# Patient Record
Sex: FEMALE | Race: WHITE | NOT HISPANIC OR LATINO | Employment: FULL TIME | URBAN - METROPOLITAN AREA
[De-identification: names, ages, dates, MRNs, and addresses within clinical notes are randomized per-mention and may not be internally consistent; named-entity substitution may affect disease eponyms.]

---

## 2017-05-15 ENCOUNTER — OFFICE VISIT (OUTPATIENT)
Dept: INTERNAL MEDICINE | Facility: CLINIC | Age: 52
End: 2017-05-15

## 2017-05-15 VITALS
SYSTOLIC BLOOD PRESSURE: 128 MMHG | DIASTOLIC BLOOD PRESSURE: 80 MMHG | HEIGHT: 67 IN | RESPIRATION RATE: 14 BRPM | BODY MASS INDEX: 36.88 KG/M2 | WEIGHT: 235 LBS

## 2017-05-15 DIAGNOSIS — E53.8 VITAMIN B 12 DEFICIENCY: ICD-10-CM

## 2017-05-15 DIAGNOSIS — Z12.11 SCREENING FOR COLON CANCER: ICD-10-CM

## 2017-05-15 DIAGNOSIS — Z00.00 ANNUAL PHYSICAL EXAM: Primary | ICD-10-CM

## 2017-05-15 DIAGNOSIS — E03.9 HYPOTHYROIDISM, ADULT: ICD-10-CM

## 2017-05-15 DIAGNOSIS — F90.8 ATTENTION-DEFICIT HYPERACTIVITY DISORDER, OTHER TYPE: ICD-10-CM

## 2017-05-15 DIAGNOSIS — E55.9 VITAMIN D DEFICIENCY: ICD-10-CM

## 2017-05-15 DIAGNOSIS — Z12.4 PAP SMEAR FOR CERVICAL CANCER SCREENING: ICD-10-CM

## 2017-05-15 PROBLEM — F90.9 ATTENTION DEFICIT HYPERACTIVITY DISORDER (ADHD): Status: ACTIVE | Noted: 2017-05-15

## 2017-05-15 LAB
25(OH)D3 SERPL-MCNC: 15.7 NG/ML (ref 30–100)
ALBUMIN SERPL-MCNC: 4.7 G/DL (ref 3.5–5.2)
ALBUMIN/GLOB SERPL: 1.6 G/DL
ALP SERPL-CCNC: 103 U/L (ref 39–117)
ALT SERPL-CCNC: 20 U/L (ref 1–33)
AST SERPL-CCNC: 15 U/L (ref 1–32)
BACTERIA UR QL AUTO: ABNORMAL /HPF
BASOPHILS # BLD AUTO: 0.03 10*3/MM3 (ref 0–0.2)
BASOPHILS NFR BLD AUTO: 0.3 % (ref 0–1.5)
BILIRUB SERPL-MCNC: 0.4 MG/DL (ref 0.1–1.2)
BILIRUB UR QL STRIP: NEGATIVE
BUN SERPL-MCNC: 13 MG/DL (ref 6–20)
BUN/CREAT SERPL: 16.9 (ref 7–25)
CALCIUM SERPL-MCNC: 10.3 MG/DL (ref 8.6–10.5)
CHLORIDE SERPL-SCNC: 101 MMOL/L (ref 98–107)
CHOLEST SERPL-MCNC: 256 MG/DL (ref 0–200)
CLARITY UR: CLEAR
CO2 SERPL-SCNC: 28.3 MMOL/L (ref 22–29)
COLOR UR: YELLOW
CREAT SERPL-MCNC: 0.77 MG/DL (ref 0.57–1)
EOSINOPHIL # BLD AUTO: 0.08 10*3/MM3 (ref 0–0.7)
EOSINOPHIL # BLD AUTO: 0.7 % (ref 0.3–6.2)
ERYTHROCYTE [DISTWIDTH] IN BLOOD BY AUTOMATED COUNT: 13.5 % (ref 11.7–13)
FOLATE SERPL-MCNC: 13.07 NG/ML (ref 4.78–24.2)
GLOBULIN SER CALC-MCNC: 2.9 GM/DL
GLUCOSE SERPL-MCNC: 83 MG/DL (ref 65–99)
GLUCOSE UR STRIP-MCNC: NEGATIVE MG/DL
HCT VFR BLD AUTO: 42.5 % (ref 35.6–45.5)
HDLC SERPL-MCNC: 76 MG/DL (ref 40–60)
HEMOCCULT STL QL IA: NEGATIVE
HGB BLD-MCNC: 13.5 G/DL (ref 11.9–15.5)
HGB UR QL STRIP.AUTO: ABNORMAL
HYALINE CASTS UR QL AUTO: ABNORMAL /LPF
IMM GRANULOCYTES # BLD: 0.1 10*3/MM3 (ref 0–0.03)
IMM GRANULOCYTES NFR BLD: 0.8 % (ref 0–0.5)
KETONES UR QL STRIP: NEGATIVE
LDLC SERPL CALC-MCNC: 146 MG/DL (ref 0–100)
LEUKOCYTE ESTERASE UR QL STRIP.AUTO: NEGATIVE
LYMPHOCYTES # BLD AUTO: 3.34 10*3/MM3 (ref 0.9–4.8)
LYMPHOCYTES NFR BLD AUTO: 28.3 % (ref 19.6–45.3)
MCH RBC QN AUTO: 28.1 PG (ref 26.9–32)
MCHC RBC AUTO-ENTMCNC: 31.8 G/DL (ref 32.4–36.3)
MCV RBC AUTO: 88.5 FL (ref 80.5–98.2)
MONOCYTES # BLD AUTO: 0.57 10*3/MM3 (ref 0.2–1.2)
MONOCYTES NFR BLD AUTO: 4.8 % (ref 5–12)
MUCOUS THREADS URNS QL MICRO: ABNORMAL /HPF
NEUTROPHILS # BLD AUTO: 7.7 10*3/MM3 (ref 1.9–8.1)
NEUTROPHILS NFR BLD AUTO: 65.1 % (ref 42.7–76)
NITRITE UR QL STRIP: NEGATIVE
PH UR STRIP.AUTO: 5.5 [PH] (ref 5–8)
PLATELET # BLD AUTO: 361 10*3/MM3 (ref 140–500)
POTASSIUM SERPL-SCNC: 4.7 MMOL/L (ref 3.5–5.2)
PROT SERPL-MCNC: 7.6 G/DL (ref 6–8.5)
PROT UR QL STRIP: NEGATIVE
RBC # BLD AUTO: 4.8 10*6/MM3 (ref 3.9–5.2)
RBC # UR: ABNORMAL /HPF
REF LAB TEST METHOD: ABNORMAL
SODIUM SERPL-SCNC: 143 MMOL/L (ref 136–145)
SP GR UR STRIP: 1.02 (ref 1–1.03)
SQUAMOUS #/AREA URNS HPF: ABNORMAL /HPF
TRIGL SERPL-MCNC: 170 MG/DL (ref 0–150)
TSH SERPL-ACNC: 54.67 MIU/ML (ref 0.27–4.2)
UROBILINOGEN UR QL STRIP: ABNORMAL
VIT B12 SERPL-MCNC: 385 PG/ML (ref 211–946)
VLDLC SERPL-MCNC: 34 MG/DL (ref 5–40)
WBC # BLD AUTO: 11.82 10*3/MM3 (ref 4.5–10.7)
WBC UR QL AUTO: ABNORMAL /HPF

## 2017-05-15 PROCEDURE — 81001 URINALYSIS AUTO W/SCOPE: CPT | Performed by: INTERNAL MEDICINE

## 2017-05-15 PROCEDURE — 99396 PREV VISIT EST AGE 40-64: CPT | Performed by: INTERNAL MEDICINE

## 2017-05-15 PROCEDURE — 82274 ASSAY TEST FOR BLOOD FECAL: CPT | Performed by: INTERNAL MEDICINE

## 2017-05-15 PROCEDURE — 93000 ELECTROCARDIOGRAM COMPLETE: CPT | Performed by: INTERNAL MEDICINE

## 2017-05-15 RX ORDER — LEVOTHYROXINE SODIUM 137 UG/1
137 TABLET ORAL DAILY
Qty: 90 TABLET | Refills: 3 | Status: SHIPPED | OUTPATIENT
Start: 2017-05-15 | End: 2018-02-07 | Stop reason: SDUPTHER

## 2017-05-15 RX ORDER — METHYLPHENIDATE HYDROCHLORIDE 18 MG/1
18 TABLET, EXTENDED RELEASE ORAL EVERY MORNING
Qty: 30 TABLET | Refills: 0 | Status: SHIPPED | OUTPATIENT
Start: 2017-05-15 | End: 2017-05-24

## 2017-05-16 RX ORDER — LIOTHYRONINE SODIUM 5 UG/1
5 TABLET ORAL 2 TIMES DAILY
Qty: 180 TABLET | Refills: 3 | Status: SHIPPED | OUTPATIENT
Start: 2017-05-16 | End: 2018-02-07 | Stop reason: SDUPTHER

## 2017-05-19 LAB
CHROM ANALY OVERALL INTERP-IMP: NORMAL
CONV .: NORMAL
CONV PERFORMED BY:: NORMAL
DX ICD CODE: NORMAL
HIV 1 & 2 AB SER-IMP: NORMAL
HPV I/H RISK 1 DNA CVX QL PROBE+SIG AMP: NEGATIVE
REF LAB TEST METHOD: NORMAL
STAT OF ADQ CVX/VAG CYTO-IMP: NORMAL

## 2017-05-22 RX ORDER — ERGOCALCIFEROL 1.25 MG/1
50000 CAPSULE ORAL WEEKLY
Qty: 12 CAPSULE | Refills: 1 | Status: SHIPPED | OUTPATIENT
Start: 2017-05-22 | End: 2017-05-23 | Stop reason: SDUPTHER

## 2017-05-23 RX ORDER — ERGOCALCIFEROL 1.25 MG/1
CAPSULE ORAL
Qty: 12 CAPSULE | Refills: 1 | Status: SHIPPED | OUTPATIENT
Start: 2017-05-23 | End: 2017-10-27 | Stop reason: SDUPTHER

## 2017-05-23 RX ORDER — ERGOCALCIFEROL 1.25 MG/1
50000 CAPSULE ORAL WEEKLY
Qty: 12 CAPSULE | Refills: 1 | Status: SHIPPED | OUTPATIENT
Start: 2017-05-23 | End: 2017-05-23 | Stop reason: SDUPTHER

## 2017-05-24 RX ORDER — ATOMOXETINE 40 MG/1
40 CAPSULE ORAL DAILY
Qty: 30 CAPSULE | Refills: 1 | Status: SHIPPED | OUTPATIENT
Start: 2017-05-24 | End: 2017-12-20

## 2017-06-13 ENCOUNTER — OFFICE VISIT (OUTPATIENT)
Dept: INTERNAL MEDICINE | Facility: CLINIC | Age: 52
End: 2017-06-13

## 2017-06-13 ENCOUNTER — TELEPHONE (OUTPATIENT)
Dept: INTERNAL MEDICINE | Facility: CLINIC | Age: 52
End: 2017-06-13

## 2017-06-13 VITALS
RESPIRATION RATE: 14 BRPM | HEIGHT: 67 IN | DIASTOLIC BLOOD PRESSURE: 84 MMHG | SYSTOLIC BLOOD PRESSURE: 128 MMHG | WEIGHT: 235.4 LBS | BODY MASS INDEX: 36.95 KG/M2

## 2017-06-13 DIAGNOSIS — D51.0 PERNICIOUS ANEMIA: ICD-10-CM

## 2017-06-13 DIAGNOSIS — E66.9 OBESITY (BMI 30-39.9): ICD-10-CM

## 2017-06-13 DIAGNOSIS — E03.9 HYPOTHYROIDISM, ADULT: Primary | ICD-10-CM

## 2017-06-13 DIAGNOSIS — F90.8 ATTENTION-DEFICIT HYPERACTIVITY DISORDER, OTHER TYPE: ICD-10-CM

## 2017-06-13 DIAGNOSIS — E03.9 HYPOTHYROIDISM, UNSPECIFIED TYPE: ICD-10-CM

## 2017-06-13 PROCEDURE — 99213 OFFICE O/P EST LOW 20 MIN: CPT | Performed by: INTERNAL MEDICINE

## 2017-06-13 PROCEDURE — 96372 THER/PROPH/DIAG INJ SC/IM: CPT | Performed by: INTERNAL MEDICINE

## 2017-06-13 RX ORDER — CYANOCOBALAMIN 1000 UG/ML
1000 INJECTION, SOLUTION INTRAMUSCULAR; SUBCUTANEOUS
Status: DISCONTINUED | OUTPATIENT
Start: 2017-06-13 | End: 2018-10-16 | Stop reason: SDUPTHER

## 2017-06-13 RX ADMIN — CYANOCOBALAMIN 1000 MCG: 1000 INJECTION, SOLUTION INTRAMUSCULAR; SUBCUTANEOUS at 16:31

## 2017-06-13 NOTE — PROGRESS NOTES
Chief Complaint   Patient presents with   • Follow-up     1 month check up patient has been taking cytomel for thyroid and strattera         Subjective   John Monk is a 51 y.o. female     HPI:      Hypothyroidism: Current treatment levothyroxine. And liothyronine.  She is compliant with the medication, tolerates it well and reports good control of symptoms of hypothyroidism.  She has not noted any change in tolerance of heat or cold.  No change in hair or skin.  No constipation.  No symptoms of hyperthyroidism.  She does have problems with concentration.  Her recent TSH was significantly elevated.  Cytomel has recently been restarted.    ADD:  She comes in for follow up of ADD.  This is a chronic problem.  Symptoms include easy distractibility, poor concentration, short attention span, forgetfulness.  This happens at work.   Symptoms are moderate in severity.  Symptoms are unchanged.  Exacerbating factors include fatigue, distracting activities.  She has taken bupropion for this in the past.  Recently, she has started taking Strattera.  So far, she has not noted any changes in her symptoms.  Hypothyroidism may be contributing.    She has history of B12 deficiency and at one time was told she had pernicious anemia.  She would like to get a B12 injection.      The following portions of the patient's history were reviewed and updated as appropriate: allergies, current medications, past social history, problem list, past surgical history    Review of Systems   Constitutional: Negative for activity change and appetite change.   HENT: Negative for nosebleeds.    Eyes: Negative for visual disturbance.   Respiratory: Negative for cough and shortness of breath.    Cardiovascular: Negative for chest pain, palpitations and leg swelling.   Neurological: Negative for headaches.   Psychiatric/Behavioral: Negative for sleep disturbance.           Objective     /84 (BP Location: Left arm, Patient Position: Sitting, Cuff  "Size: Adult)  Resp 14  Ht 67\" (170.2 cm)  Wt 235 lb 6.4 oz (107 kg)  BMI 36.87 kg/m2     Physical Exam   Constitutional: She is oriented to person, place, and time. She appears well-developed and well-nourished. No distress.   Neck: Normal carotid pulses present. Carotid bruit is not present.   Cardiovascular: Normal rate, regular rhythm, S1 normal, S2 normal and intact distal pulses.  Exam reveals no gallop and no friction rub.    No murmur heard.  Pulses:       Carotid pulses are 2+ on the right side, and 2+ on the left side.  Pulmonary/Chest: Effort normal and breath sounds normal. No respiratory distress. She has no wheezes. She has no rhonchi. She has no rales. Chest wall is not dull to percussion.   Musculoskeletal: She exhibits no edema.   Neurological: She is alert and oriented to person, place, and time.   Skin: Skin is warm and dry.   Nursing note and vitals reviewed.      Assessment/Plan   Problem List Items Addressed This Visit        Endocrine    Hypothyroidism, adult - Primary       Other    Attention deficit hyperactivity disorder (ADHD)      Other Visit Diagnoses     Obesity (BMI 30-39.9)        Relevant Orders    Hemoglobin A1c    Hypothyroidism, unspecified type        Relevant Orders    T3, free    T4, free    TSH    Pernicious anemia        Relevant Medications    cyanocobalamin injection 1,000 mcg            "

## 2017-06-13 NOTE — TELEPHONE ENCOUNTER
----- Message from Kaylee Mccoy sent at 6/13/2017  3:52 PM EDT -----  Contact: pt  Patient would like Dr Lancaster to put lab orders in for her to have her A1C and Lipid tests done. The Patient would like the orders sent to her home so she can take them with her and have them done at her place of work.     Please advise     [phone]

## 2017-06-18 ENCOUNTER — RESULTS ENCOUNTER (OUTPATIENT)
Dept: INTERNAL MEDICINE | Facility: CLINIC | Age: 52
End: 2017-06-18

## 2017-06-18 DIAGNOSIS — E03.9 HYPOTHYROIDISM, UNSPECIFIED TYPE: ICD-10-CM

## 2017-06-18 DIAGNOSIS — E66.9 OBESITY (BMI 30-39.9): ICD-10-CM

## 2017-06-27 ENCOUNTER — OFFICE VISIT (OUTPATIENT)
Dept: INTERNAL MEDICINE | Facility: CLINIC | Age: 52
End: 2017-06-27

## 2017-06-27 ENCOUNTER — APPOINTMENT (OUTPATIENT)
Dept: WOMENS IMAGING | Facility: HOSPITAL | Age: 52
End: 2017-06-27

## 2017-06-27 DIAGNOSIS — Z12.31 ENCOUNTER FOR SCREENING MAMMOGRAM FOR BREAST CANCER: ICD-10-CM

## 2017-06-27 DIAGNOSIS — Z12.31 ENCOUNTER FOR SCREENING MAMMOGRAM FOR BREAST CANCER: Primary | ICD-10-CM

## 2017-06-27 PROCEDURE — 77067 SCR MAMMO BI INCL CAD: CPT | Performed by: INTERNAL MEDICINE

## 2017-06-27 PROCEDURE — 77067 SCR MAMMO BI INCL CAD: CPT | Performed by: RADIOLOGY

## 2017-07-18 ENCOUNTER — CLINICAL SUPPORT (OUTPATIENT)
Dept: INTERNAL MEDICINE | Facility: CLINIC | Age: 52
End: 2017-07-18

## 2017-07-18 ENCOUNTER — LAB (OUTPATIENT)
Dept: INTERNAL MEDICINE | Facility: CLINIC | Age: 52
End: 2017-07-18

## 2017-07-18 DIAGNOSIS — E03.9 HYPOTHYROIDISM, ADULT: Primary | ICD-10-CM

## 2017-07-18 DIAGNOSIS — E53.8 B12 DEFICIENCY: Primary | ICD-10-CM

## 2017-07-18 DIAGNOSIS — E66.9 OBESITY (BMI 30-39.9): ICD-10-CM

## 2017-07-18 LAB — HBA1C MFR BLD: 5.6 % (ref 4–6)

## 2017-07-18 PROCEDURE — 96372 THER/PROPH/DIAG INJ SC/IM: CPT | Performed by: INTERNAL MEDICINE

## 2017-07-18 PROCEDURE — 83036 HEMOGLOBIN GLYCOSYLATED A1C: CPT | Performed by: INTERNAL MEDICINE

## 2017-07-18 RX ORDER — CYANOCOBALAMIN 1000 UG/ML
1000 INJECTION, SOLUTION INTRAMUSCULAR; SUBCUTANEOUS
Status: DISCONTINUED | OUTPATIENT
Start: 2017-07-18 | End: 2018-10-16 | Stop reason: SDUPTHER

## 2017-07-18 RX ADMIN — CYANOCOBALAMIN 1000 MCG: 1000 INJECTION, SOLUTION INTRAMUSCULAR; SUBCUTANEOUS at 15:57

## 2017-07-19 LAB
T3FREE SERPL-MCNC: 4.6 PG/ML (ref 2–4.4)
T4 FREE SERPL-MCNC: 1.64 NG/DL (ref 0.93–1.7)
TSH SERPL-ACNC: 0.13 MIU/ML (ref 0.27–4.2)

## 2017-10-13 RX ORDER — OMEPRAZOLE 40 MG/1
40 CAPSULE, DELAYED RELEASE ORAL DAILY
Qty: 90 CAPSULE | Refills: 3 | Status: SHIPPED | OUTPATIENT
Start: 2017-10-13 | End: 2018-10-21 | Stop reason: SDUPTHER

## 2017-10-30 RX ORDER — ERGOCALCIFEROL 1.25 MG/1
CAPSULE ORAL
Qty: 12 CAPSULE | Refills: 0 | Status: SHIPPED | OUTPATIENT
Start: 2017-10-30 | End: 2018-02-07 | Stop reason: SDUPTHER

## 2017-12-20 ENCOUNTER — OFFICE VISIT (OUTPATIENT)
Dept: INTERNAL MEDICINE | Facility: CLINIC | Age: 52
End: 2017-12-20

## 2017-12-20 VITALS
OXYGEN SATURATION: 99 % | TEMPERATURE: 98.1 F | SYSTOLIC BLOOD PRESSURE: 140 MMHG | HEART RATE: 72 BPM | HEIGHT: 67 IN | DIASTOLIC BLOOD PRESSURE: 90 MMHG

## 2017-12-20 DIAGNOSIS — L30.9 DERMATITIS: ICD-10-CM

## 2017-12-20 DIAGNOSIS — J02.9 SORE THROAT: Primary | ICD-10-CM

## 2017-12-20 DIAGNOSIS — R03.0 ELEVATED BLOOD PRESSURE READING: ICD-10-CM

## 2017-12-20 LAB
EXPIRATION DATE: NORMAL
INTERNAL CONTROL: NORMAL
Lab: NORMAL
S PYO AG THROAT QL: NEGATIVE

## 2017-12-20 PROCEDURE — 87880 STREP A ASSAY W/OPTIC: CPT | Performed by: NURSE PRACTITIONER

## 2017-12-20 PROCEDURE — 99213 OFFICE O/P EST LOW 20 MIN: CPT | Performed by: NURSE PRACTITIONER

## 2017-12-20 RX ORDER — BUPROPION HYDROCHLORIDE 300 MG/1
300 TABLET ORAL DAILY
COMMUNITY
End: 2018-02-07 | Stop reason: SDUPTHER

## 2017-12-20 NOTE — PATIENT INSTRUCTIONS
Eczema  Eczema, also called atopic dermatitis, is a skin disorder that causes inflammation of the skin. It causes a red rash and dry, scaly skin. The skin becomes very itchy. Eczema is generally worse during the cooler winter months and often improves with the warmth of summer. Eczema usually starts showing signs in infancy. Some children outgrow eczema, but it may last through adulthood.   CAUSES   The exact cause of eczema is not known, but it appears to run in families. People with eczema often have a family history of eczema, allergies, asthma, or hay fever. Eczema is not contagious.  Flare-ups of the condition may be caused by:   · Contact with something you are sensitive or allergic to.    · Stress.  SIGNS AND SYMPTOMS  · Dry, scaly skin.    · Red, itchy rash.    · Itchiness. This may occur before the skin rash and may be very intense.    DIAGNOSIS   The diagnosis of eczema is usually made based on symptoms and medical history.  TREATMENT   Eczema cannot be cured, but symptoms usually can be controlled with treatment and other strategies. A treatment plan might include:  · Controlling the itching and scratching.      Use over-the-counter antihistamines as directed for itching. This is especially useful at night when the itching tends to be worse.      Use over-the-counter steroid creams as directed for itching.      Avoid scratching. Scratching makes the rash and itching worse. It may also result in a skin infection (impetigo) due to a break in the skin caused by scratching.    · Keeping the skin well moisturized with creams every day. This will seal in moisture and help prevent dryness. Lotions that contain alcohol and water should be avoided because they can dry the skin.    · Limiting exposure to things that you are sensitive or allergic to (allergens).    · Recognizing situations that cause stress.    · Developing a plan to manage stress.    HOME CARE INSTRUCTIONS   · Only take over-the-counter or  prescription medicines as directed by your health care provider.    · Do not use anything on the skin without checking with your health care provider.    · Keep baths or showers short (5 minutes) in warm (not hot) water. Use mild cleansers for bathing. These should be unscented. You may add nonperfumed bath oil to the bath water. It is best to avoid soap and bubble bath.    · Immediately after a bath or shower, when the skin is still damp, apply a moisturizing ointment to the entire body. This ointment should be a petroleum ointment. This will seal in moisture and help prevent dryness. The thicker the ointment, the better. These should be unscented.    · Keep fingernails cut short. Children with eczema may need to wear soft gloves or mittens at night after applying an ointment.    · Dress in clothes made of cotton or cotton blends. Dress lightly, because heat increases itching.    · A child with eczema should stay away from anyone with fever blisters or cold sores. The virus that causes fever blisters (herpes simplex) can cause a serious skin infection in children with eczema.  SEEK MEDICAL CARE IF:   · Your itching interferes with sleep.    · Your rash gets worse or is not better within 1 week after starting treatment.    · You see pus or soft yellow scabs in the rash area.    · You have a fever.    · You have a rash flare-up after contact with someone who has fever blisters.       This information is not intended to replace advice given to you by your health care provider. Make sure you discuss any questions you have with your health care provider.     Document Released: 12/15/2001 Document Revised: 10/08/2014 Document Reviewed: 07/21/2014  [facility] Interactive Patient Education ©2017 [facility] [employer]

## 2017-12-20 NOTE — PROGRESS NOTES
Subjective   John Monk is a 52 y.o. female.     Sore Throat    This is a new problem. The current episode started in the past 7 days. The problem has been gradually improving. Neither side of throat is experiencing more pain than the other. There has been no fever. The pain is at a severity of 0/10. The patient is experiencing no pain. Associated symptoms include congestion (throat) and coughing (varies in productive ). Pertinent negatives include no abdominal pain, ear discharge, ear pain, plugged ear sensation, shortness of breath, swollen glands or trouble swallowing. She has had no exposure to strep or mono. She has tried NSAIDs (mucinex ) for the symptoms. The treatment provided mild relief.   Rash   This is a new problem. The current episode started more than 1 month ago (has had rash for 6 months, however it got worst today ). The problem has been gradually worsening since onset. The affected locations include the chest. Associated symptoms include congestion (throat), coughing (varies in productive ) and a sore throat. Pertinent negatives include no fatigue, fever or shortness of breath. Past treatments include nothing.        The following portions of the patient's history were reviewed and updated as appropriate: allergies, current medications and problem list.    Review of Systems   Constitutional: Negative for fatigue and fever.   HENT: Positive for congestion (throat) and sore throat. Negative for ear discharge, ear pain and trouble swallowing.    Respiratory: Positive for cough (varies in productive ). Negative for shortness of breath.    Gastrointestinal: Negative for abdominal pain.   Skin: Positive for rash (to upper chest area only ).       Objective   Physical Exam   Constitutional: She appears well-developed and well-nourished. She is cooperative. She does not have a sickly appearance. She does not appear ill.   HENT:   Head: Normocephalic.   Right Ear: Hearing, tympanic membrane and external  ear normal. No drainage, swelling or tenderness. No mastoid tenderness. Tympanic membrane is not injected, not scarred, not erythematous and not bulging. Tympanic membrane mobility is normal. No middle ear effusion. No decreased hearing is noted.   Left Ear: Hearing, tympanic membrane and external ear normal. No drainage, swelling or tenderness. No mastoid tenderness. Tympanic membrane is not injected, not scarred, not erythematous and not bulging. Tympanic membrane mobility is normal.  No middle ear effusion. No decreased hearing is noted.   Nose: Nose normal. No mucosal edema, rhinorrhea, sinus tenderness or nasal deformity. Right sinus exhibits no maxillary sinus tenderness and no frontal sinus tenderness. Left sinus exhibits no maxillary sinus tenderness and no frontal sinus tenderness.   Mouth/Throat: Mucous membranes are normal. Normal dentition. Posterior oropharyngeal erythema (mild) present. No tonsillar exudate.   Eyes: Conjunctivae and lids are normal. Pupils are equal, round, and reactive to light. Right eye exhibits no discharge and no exudate. Left eye exhibits no discharge and no exudate.   Neck: Trachea normal and normal range of motion. Carotid bruit is not present. No edema present. No thyroid mass and no thyromegaly present.   Cardiovascular: Regular rhythm, normal heart sounds and normal pulses.    No murmur heard.  Repeat bp less than 138/88  No pedal edema    Pulmonary/Chest: Breath sounds normal. No respiratory distress. She has no decreased breath sounds. She has no wheezes. She has no rhonchi. She has no rales.   Lymphadenopathy:        Head (right side): No submental, no submandibular, no tonsillar, no preauricular, no posterior auricular and no occipital adenopathy present.        Head (left side): No submental, no submandibular, no tonsillar, no preauricular, no posterior auricular and no occipital adenopathy present.   Neurological: She is alert.   Skin: Skin is warm, dry and intact.  Rash noted. Rash is macular. No cyanosis. Nails show no clubbing.   Dry, erythema patches to upper chest wall area       Assessment/Plan   Amal was seen today for sore throat and rash.    Diagnoses and all orders for this visit:    Sore throat  Comments:  warm salt gargles   Orders:  -     POCT rapid strep A    Dermatitis  -     Crisaborole (EUCRISA) 2 % ointment; Apply 1 application topically 2 (Two) Times a Day.    Elevated blood pressure reading  Comments:  she will monitor and call if greater than 140/90; discuss low salt diet, weight loss and exercise    Rapid strep negative. If rash persist will refer to dermatologist.

## 2018-02-07 ENCOUNTER — OFFICE VISIT (OUTPATIENT)
Dept: INTERNAL MEDICINE | Facility: CLINIC | Age: 53
End: 2018-02-07

## 2018-02-07 VITALS
SYSTOLIC BLOOD PRESSURE: 138 MMHG | WEIGHT: 246 LBS | DIASTOLIC BLOOD PRESSURE: 84 MMHG | HEIGHT: 67 IN | BODY MASS INDEX: 38.61 KG/M2

## 2018-02-07 DIAGNOSIS — E28.2 POLYCYSTIC OVARIAN SYNDROME: ICD-10-CM

## 2018-02-07 DIAGNOSIS — E03.9 HYPOTHYROIDISM, ADULT: Primary | ICD-10-CM

## 2018-02-07 DIAGNOSIS — I10 ESSENTIAL HYPERTENSION: ICD-10-CM

## 2018-02-07 LAB
ALBUMIN SERPL-MCNC: 4.6 G/DL (ref 3.5–5.2)
ALBUMIN/GLOB SERPL: 1.5 G/DL
ALP SERPL-CCNC: 83 U/L (ref 39–117)
ALT SERPL W P-5'-P-CCNC: 20 U/L (ref 1–33)
ANION GAP SERPL CALCULATED.3IONS-SCNC: 12.3 MMOL/L
AST SERPL-CCNC: 12 U/L (ref 1–32)
BILIRUB SERPL-MCNC: 0.3 MG/DL (ref 0.1–1.2)
BUN BLD-MCNC: 14 MG/DL (ref 6–20)
BUN/CREAT SERPL: 16.1 (ref 7–25)
CALCIUM SPEC-SCNC: 10.2 MG/DL (ref 8.6–10.5)
CHLORIDE SERPL-SCNC: 97 MMOL/L (ref 98–107)
CHOLEST SERPL-MCNC: 216 MG/DL (ref 0–200)
CO2 SERPL-SCNC: 28.7 MMOL/L (ref 22–29)
CREAT BLD-MCNC: 0.87 MG/DL (ref 0.57–1)
ERYTHROCYTE [DISTWIDTH] IN BLOOD BY AUTOMATED COUNT: 13.9 % (ref 11.7–13)
GFR SERPL CREATININE-BSD FRML MDRD: 69 ML/MIN/1.73
GLOBULIN UR ELPH-MCNC: 3 GM/DL
GLUCOSE BLD-MCNC: 85 MG/DL (ref 65–99)
HBA1C MFR BLD: 5.1 % (ref 4.8–5.6)
HCT VFR BLD AUTO: 40.7 % (ref 35.6–45.5)
HDLC SERPL-MCNC: 67 MG/DL (ref 40–60)
HGB BLD-MCNC: 12.8 G/DL (ref 11.9–15.5)
LDLC SERPL CALC-MCNC: 118 MG/DL (ref 0–100)
LDLC/HDLC SERPL: 1.76 {RATIO}
MCH RBC QN AUTO: 27.4 PG (ref 26.9–32)
MCHC RBC AUTO-ENTMCNC: 31.4 G/DL (ref 32.4–36.3)
MCV RBC AUTO: 87.2 FL (ref 80.5–98.2)
PLATELET # BLD AUTO: 323 10*3/MM3 (ref 140–500)
POTASSIUM BLD-SCNC: 4.6 MMOL/L (ref 3.5–5.2)
PROT SERPL-MCNC: 7.6 G/DL (ref 6–8.5)
RBC # BLD AUTO: 4.67 10*6/MM3 (ref 3.9–5.2)
SODIUM BLD-SCNC: 138 MMOL/L (ref 136–145)
T4 FREE SERPL-MCNC: 1.73 NG/DL (ref 0.93–1.7)
TRIGL SERPL-MCNC: 156 MG/DL (ref 0–150)
TSH SERPL-ACNC: 0.05 MIU/ML (ref 0.27–4.2)
VLDLC SERPL-MCNC: 31.2 MG/DL (ref 5–40)
WBC # BLD AUTO: 9.88 10*3/MM3 (ref 4.5–10.7)

## 2018-02-07 PROCEDURE — 80053 COMPREHEN METABOLIC PANEL: CPT | Performed by: INTERNAL MEDICINE

## 2018-02-07 PROCEDURE — 99214 OFFICE O/P EST MOD 30 MIN: CPT | Performed by: INTERNAL MEDICINE

## 2018-02-07 PROCEDURE — 80061 LIPID PANEL: CPT | Performed by: INTERNAL MEDICINE

## 2018-02-07 RX ORDER — LIOTHYRONINE SODIUM 5 UG/1
5 TABLET ORAL DAILY
Qty: 90 TABLET | Refills: 1 | Status: SHIPPED | OUTPATIENT
Start: 2018-02-07 | End: 2018-09-14 | Stop reason: SDUPTHER

## 2018-02-07 RX ORDER — ERGOCALCIFEROL 1.25 MG/1
50000 CAPSULE ORAL
Qty: 12 CAPSULE | Refills: 1 | Status: SHIPPED | OUTPATIENT
Start: 2018-02-07 | End: 2018-09-30 | Stop reason: SDUPTHER

## 2018-02-07 RX ORDER — OLMESARTAN MEDOXOMIL AND HYDROCHLOROTHIAZIDE 20/12.5 20; 12.5 MG/1; MG/1
1 TABLET ORAL DAILY
Qty: 90 TABLET | Refills: 1 | Status: SHIPPED | OUTPATIENT
Start: 2018-02-07 | End: 2018-09-14 | Stop reason: SDUPTHER

## 2018-02-07 RX ORDER — BUPROPION HYDROCHLORIDE 300 MG/1
300 TABLET ORAL DAILY
Qty: 90 TABLET | Refills: 1 | Status: SHIPPED | OUTPATIENT
Start: 2018-02-07 | End: 2018-07-10 | Stop reason: SDUPTHER

## 2018-02-07 RX ORDER — METFORMIN HYDROCHLORIDE 500 MG/1
500 TABLET, EXTENDED RELEASE ORAL 2 TIMES DAILY
Qty: 180 TABLET | Refills: 1 | Status: SHIPPED | OUTPATIENT
Start: 2018-02-07 | End: 2019-02-20

## 2018-02-07 RX ORDER — LEVOTHYROXINE SODIUM 137 UG/1
137 TABLET ORAL DAILY
Qty: 90 TABLET | Refills: 3 | Status: SHIPPED | OUTPATIENT
Start: 2018-02-07 | End: 2019-02-22

## 2018-02-07 NOTE — PROGRESS NOTES
Chief Complaint   Patient presents with   • Hypothyroidism     follow up   • Hypertension        Subjective   John Monk is a 52 y.o. female     Hypothyroidism   This is a chronic problem. Associated symptoms include headaches. Pertinent negatives include no chest pain or coughing.   Hypertension   This is a new problem. The current episode started more than 1 month ago. The problem has been gradually worsening since onset. Associated symptoms include headaches. Pertinent negatives include no blurred vision, chest pain, orthopnea, palpitations, peripheral edema, PND or shortness of breath. Agents associated with hypertension include thyroid hormones. Risk factors for coronary artery disease include family history and obesity.     For hypothyroidism, she takes levothyroxine and Cytomel.  Her last TSH done in July 2017 was suppressed at 0.130.  Free T3 was 4.6.  At that time, it was recommended she reduce Cytomel from 5 mg twice a day to 5 mg once a day.  However, she continues to take 2 daily.  She has not noticed any changes in the way she tolerates heat, cold.  No palpitations.    She recently checked her blood pressure in a pharmacy.  She got a systolic pressure in the 180s.  She went to a [facility] emergency room.  There, her blood pressure was 176/85.  She had an EKG and chest x-ray.  Apparently there are no acute abnormalities.  It was going to be a long wait to see the doctor so she elected to leave.  From a family member, she has received Benicar HCT 20-12 0.5.  She's been taking 1 tablet about every other day.    PCOS: Current treatment metformin, immediate release.  She finds it difficult to take it twice a day because it does cause gastrointestinal problems.  She finds this very inconvenient at work.  She would like to try the extended release.    Vitamin D deficiency: She continues to take vitamin D 50,000 units once a week.  She needs a refill on this.  B12 deficiency, she needs a B12 shot  "today      The following portions of the patient's history were reviewed and updated as appropriate: allergies, current medications, past social history, problem list, past surgical history    Review of Systems   Constitutional: Negative for activity change and appetite change.   HENT: Negative for nosebleeds.    Eyes: Negative for blurred vision and visual disturbance.   Respiratory: Negative for cough and shortness of breath.    Cardiovascular: Negative for chest pain, palpitations, orthopnea, leg swelling and PND.   Neurological: Positive for headaches.   Psychiatric/Behavioral: Negative for sleep disturbance.           Objective     /84  Ht 170.2 cm (67\")  Wt 112 kg (246 lb)  BMI 38.53 kg/m2     Physical Exam   Constitutional: She is oriented to person, place, and time. She appears well-developed and well-nourished. No distress.   Neck: Normal carotid pulses present. Carotid bruit is not present.   Cardiovascular: Normal rate, regular rhythm, S1 normal, S2 normal and intact distal pulses.  Exam reveals no gallop and no friction rub.    No murmur heard.  Pulses:       Carotid pulses are 2+ on the right side, and 2+ on the left side.  Pulmonary/Chest: Effort normal and breath sounds normal. No respiratory distress. She has no wheezes. She has no rhonchi. She has no rales. Chest wall is not dull to percussion.   Musculoskeletal: She exhibits no edema.   Neurological: She is alert and oriented to person, place, and time.   Reflex Scores:       Patellar reflexes are 0 on the right side and 0 on the left side.  Skin: Skin is warm and dry.   Nursing note and vitals reviewed.      Assessment/Plan   Problem List Items Addressed This Visit        Cardiovascular and Mediastinum    Essential hypertension    Relevant Medications    olmesartan-hydrochlorothiazide (BENICAR HCT) 20-12.5 MG per tablet    Other Relevant Orders    Comprehensive Metabolic Panel    CBC (No Diff)    Lipid Panel       Endocrine    " Hypothyroidism, adult - Primary    Relevant Medications    levothyroxine (SYNTHROID, LEVOTHROID) 137 MCG tablet    liothyronine (CYTOMEL) 5 MCG tablet    Other Relevant Orders    TSH Rfx On Abnormal To Free T4    Polycystic ovarian syndrome    Relevant Orders    Hemoglobin A1c

## 2018-03-07 ENCOUNTER — TELEPHONE (OUTPATIENT)
Dept: INTERNAL MEDICINE | Facility: CLINIC | Age: 53
End: 2018-03-07

## 2018-03-07 DIAGNOSIS — E28.2 POLYCYSTIC OVARIAN SYNDROME: ICD-10-CM

## 2018-03-07 DIAGNOSIS — E03.9 HYPOTHYROIDISM, ADULT: Primary | ICD-10-CM

## 2018-03-07 NOTE — TELEPHONE ENCOUNTER
----- Message from Abby Ford sent at 3/7/2018  3:56 PM EST -----  Contact: Patient   Patient called needing a referral to [facility] for polycystic ovarian syndrome and hypothyroidism.  Please advise.      Office:  [phone]   Fax:  [phone]

## 2018-05-15 ENCOUNTER — OFFICE VISIT (OUTPATIENT)
Dept: ENDOCRINOLOGY | Age: 53
End: 2018-05-15

## 2018-05-15 VITALS
OXYGEN SATURATION: 97 % | SYSTOLIC BLOOD PRESSURE: 110 MMHG | DIASTOLIC BLOOD PRESSURE: 70 MMHG | HEART RATE: 91 BPM | WEIGHT: 251 LBS | BODY MASS INDEX: 39.39 KG/M2 | HEIGHT: 67 IN

## 2018-05-15 DIAGNOSIS — E28.2 POLYCYSTIC OVARIAN SYNDROME: ICD-10-CM

## 2018-05-15 DIAGNOSIS — E66.09 CLASS 2 OBESITY DUE TO EXCESS CALORIES WITHOUT SERIOUS COMORBIDITY WITH BODY MASS INDEX (BMI) OF 36.0 TO 36.9 IN ADULT: ICD-10-CM

## 2018-05-15 DIAGNOSIS — E03.9 HYPOTHYROIDISM, ADULT: Primary | ICD-10-CM

## 2018-05-15 PROCEDURE — 99205 OFFICE O/P NEW HI 60 MIN: CPT | Performed by: INTERNAL MEDICINE

## 2018-05-15 NOTE — PROGRESS NOTES
Chief Complaint   Patient presents with   • Hypothyroidism   NEW PATIENT APPOINTMENT/HYPOTHYROIDISM    John Monk 52 y.o. WF presents as a new patient for the evaluation of Hypothyroidism. Consulted by Dr. Lancaster.     Hypothyroidism - Pt was diagnosed with hypothyroidism since 2004, currently on levothyroxine 137 mcg oral daily and cytomel 5 mcg oral daily.   Takes the medication every single day on empty stomach.     Pt reports having symptoms for about 1 - 2 years.     She complains of decent energy levels, normal BM, no hair loss, no increased sweating, no dry skin, sleep is good.   c/o heat intolerance and no cold intolerance. c/o tremors, racing of heart and no eye symptoms.   Denied c/o difficulty breathing, swallowing and change in voice.   Family hx of thyroid disease in her mother.     PCOS - diagnosed in 2008, based on U/S of her ovaries which showed cysts, some increased hair growth on her face, has to shave once every 3 weeks, planning on laser therapy on her face, also on metformin  mg po bid but she is not very compliant with the medication.   Menarche at age 14 - 15 years, last period was in 2014, currently in menopause.   She has 1 kid of her own. No miscarriages.     Obesity - Current weight is 251 pounds, she feels her ideal weight is 160 - 170 pounds. She is following Yo - Yo diet to lose weight.   She used to exercise about 2 years ago but now not exercising due to knee pain.   Was on phentermine which helped her to lose weight, lost about 40 pounds on the medication. Was on medication for about 3 - 4 months.         I have reviewed the patient's allergies, medicines, past medical hx, family hx and social hx in detail.    Past Medical History:   Diagnosis Date   • Hyperlipidemia    • Hypothyroidism    • Nephrolithiasis    • Polycystic ovarian syndrome        Family History   Problem Relation Age of Onset   • Heart disease Mother    • Hypertension Mother    • Heart disease Father    •  Hypertension Father    • Nephrolithiasis Father        Social History     Social History   • Marital status:      Spouse name: N/A   • Number of children: N/A   • Years of education: N/A     Occupational History   • Not on file.     Social History Main Topics   • Smoking status: Never Smoker   • Smokeless tobacco: Not on file   • Alcohol use No   • Drug use: Unknown   • Sexual activity: Not on file     Other Topics Concern   • Not on file     Social History Narrative   • No narrative on file       No Known Allergies      Current Outpatient Prescriptions:   •  buPROPion XL (WELLBUTRIN XL) 300 MG 24 hr tablet, Take 1 tablet by mouth Daily., Disp: 90 tablet, Rfl: 1  •  Crisaborole (EUCRISA) 2 % ointment, Apply 1 application topically 2 (Two) Times a Day., Disp: 2.5 g, Rfl: 0  •  cyanocobalamin 1000 MCG/ML injection, Cyanocobalamin 1000 MCG/ML Injection Solution; Patient Sig: Cyanocobalamin 1000 MCG/ML Injection Solution INJECT 1 ML IN THE MUSCLE ONCE WEEKLY; 12; 0; 08-Apr-2014; Active; APPROVED BY DR. BARAJAS ON 1/15/15, Disp: , Rfl:   •  levothyroxine (SYNTHROID, LEVOTHROID) 137 MCG tablet, Take 1 tablet by mouth Daily., Disp: 90 tablet, Rfl: 3  •  liothyronine (CYTOMEL) 5 MCG tablet, Take 1 tablet by mouth Daily., Disp: 90 tablet, Rfl: 1  •  metFORMIN ER (GLUCOPHAGE-XR) 500 MG 24 hr tablet, Take 1 tablet by mouth 2 (Two) Times a Day., Disp: 180 tablet, Rfl: 1  •  olmesartan-hydrochlorothiazide (BENICAR HCT) 20-12.5 MG per tablet, Take 1 tablet by mouth Daily., Disp: 90 tablet, Rfl: 1  •  omeprazole (priLOSEC) 40 MG capsule, Take 1 capsule by mouth Daily., Disp: 90 capsule, Rfl: 3  •  vitamin D (ERGOCALCIFEROL) 25968 units capsule capsule, Take 1 capsule by mouth Every 7 (Seven) Days., Disp: 12 capsule, Rfl: 1  •  Phentermine-Topiramate (QSYMIA) 7.5-46 MG capsule sustained-release 24 hr, Take 1 tablet daily, Disp: 30 capsule, Rfl: 3    Current Facility-Administered Medications:   •  cyanocobalamin injection  "1,000 mcg, 1,000 mcg, Intramuscular, Q28 Days, Colette Lancaster MD, 1,000 mcg at 06/13/17 1631  •  cyanocobalamin injection 1,000 mcg, 1,000 mcg, Intramuscular, Q28 Days, Colette Lancaster MD, 1,000 mcg at 07/18/17 1557     Review of Systems   Constitutional: Negative for appetite change, fatigue and fever.   Eyes: Negative for visual disturbance.   Respiratory: Negative for shortness of breath.    Cardiovascular: Negative for palpitations and leg swelling.   Gastrointestinal: Negative for abdominal pain and vomiting.   Endocrine: Negative for polydipsia and polyuria.   Musculoskeletal: Positive for joint swelling. Negative for neck pain.   Skin: Negative for rash.   Neurological: Negative for weakness and numbness.   Psychiatric/Behavioral: Negative for behavioral problems.       Objective:    /70   Pulse 91   Ht 170.2 cm (67\")   Wt 114 kg (251 lb)   SpO2 97%   BMI 39.31 kg/m²     Physical Exam   Constitutional: She is oriented to person, place, and time. She appears well-nourished.   Obese     HENT:   Head: Normocephalic and atraumatic.   Wide neck   Eyes: Conjunctivae and EOM are normal. No scleral icterus.   Neck: Normal range of motion. Neck supple. No thyromegaly present.   Cardiovascular: Normal rate and normal heart sounds.    Pulmonary/Chest: Effort normal and breath sounds normal. No stridor. She has no wheezes.   Abdominal: Soft. Bowel sounds are normal. She exhibits no distension. There is no tenderness.   Central obesity   Musculoskeletal: She exhibits edema. She exhibits no tenderness.   Neurological: She is alert and oriented to person, place, and time.   Skin: Skin is warm and dry. She is not diaphoretic.   Psychiatric: She has a normal mood and affect.   Vitals reviewed.      Results Review:    I reviewed the patient's new clinical results.    Office Visit on 02/07/2018   Component Date Value Ref Range Status   • Glucose 02/07/2018 85  65 - 99 mg/dL Final   • BUN 02/07/2018 14  6 - 20 mg/dL " Final   • Creatinine 02/07/2018 0.87  0.57 - 1.00 mg/dL Final   • Sodium 02/07/2018 138  136 - 145 mmol/L Final   • Potassium 02/07/2018 4.6  3.5 - 5.2 mmol/L Final   • Chloride 02/07/2018 97* 98 - 107 mmol/L Final   • CO2 02/07/2018 28.7  22.0 - 29.0 mmol/L Final   • Calcium 02/07/2018 10.2  8.6 - 10.5 mg/dL Final   • Total Protein 02/07/2018 7.6  6.0 - 8.5 g/dL Final   • Albumin 02/07/2018 4.60  3.50 - 5.20 g/dL Final   • ALT (SGPT) 02/07/2018 20  1 - 33 U/L Final   • AST (SGOT) 02/07/2018 12  1 - 32 U/L Final   • Alkaline Phosphatase 02/07/2018 83  39 - 117 U/L Final   • Total Bilirubin 02/07/2018 0.3  0.1 - 1.2 mg/dL Final   • eGFR Non African Amer 02/07/2018 69  >60 mL/min/1.73 Final   • Globulin 02/07/2018 3.0  gm/dL Final   • A/G Ratio 02/07/2018 1.5  g/dL Final   • BUN/Creatinine Ratio 02/07/2018 16.1  7.0 - 25.0 Final   • Anion Gap 02/07/2018 12.3  mmol/L Final   • WBC 02/07/2018 9.88  4.50 - 10.70 10*3/mm3 Final   • RBC 02/07/2018 4.67  3.90 - 5.20 10*6/mm3 Final   • Hemoglobin 02/07/2018 12.8  11.9 - 15.5 g/dL Final   • Hematocrit 02/07/2018 40.7  35.6 - 45.5 % Final   • MCV 02/07/2018 87.2  80.5 - 98.2 fL Final   • MCH 02/07/2018 27.4  26.9 - 32.0 pg Final   • MCHC 02/07/2018 31.4* 32.4 - 36.3 g/dL Final   • RDW 02/07/2018 13.9* 11.7 - 13.0 % Final   • Platelets 02/07/2018 323  140 - 500 10*3/mm3 Final   • Total Cholesterol 02/07/2018 216* 0 - 200 mg/dL Final   • Triglycerides 02/07/2018 156* 0 - 150 mg/dL Final   • HDL Cholesterol 02/07/2018 67* 40 - 60 mg/dL Final   • LDL Cholesterol  02/07/2018 118* 0 - 100 mg/dL Final   • VLDL Cholesterol 02/07/2018 31.2  5 - 40 mg/dL Final   • LDL/HDL Ratio 02/07/2018 1.76   Final   • TSH 02/07/2018 0.046* 0.27 - 4.2 mIU/mL Final   • Hemoglobin A1C 02/07/2018 5.10  4.80 - 5.60 % Final   • Free T4 02/07/2018 1.73* 0.93 - 1.70 ng/dL Final         Amal was seen today for hypothyroidism.    Diagnoses and all orders for this visit:    Hypothyroidism, adult  -     TSH  -      T4, Free  -     Hemoglobin A1c  -     Comprehensive Metabolic Panel  -     Lipid Panel  -     Vitamin D 25 Hydroxy  -     Vitamin B12 & Folate  -     FSH & LH  -     Estradiol  -     TSH; Future  -     T4, Free; Future  -     Comprehensive Metabolic Panel; Future    Polycystic ovarian syndrome  -     TSH  -     T4, Free  -     Hemoglobin A1c  -     Comprehensive Metabolic Panel  -     Lipid Panel  -     Vitamin D 25 Hydroxy  -     Vitamin B12 & Folate  -     FSH & LH  -     Estradiol  -     TSH; Future  -     T4, Free; Future  -     Comprehensive Metabolic Panel; Future    Class 2 obesity due to excess calories without serious comorbidity with body mass index (BMI) of 36.0 to 36.9 in adult  -     TSH  -     T4, Free  -     Hemoglobin A1c  -     Comprehensive Metabolic Panel  -     Lipid Panel  -     Vitamin D 25 Hydroxy  -     Vitamin B12 & Folate  -     FSH & LH  -     Estradiol  -     TSH; Future  -     T4, Free; Future  -     Comprehensive Metabolic Panel; Future    Other orders  -     Phentermine-Topiramate (QSYMIA) 7.5-46 MG capsule sustained-release 24 hr; Take 1 tablet daily      Hypothyroidism  Will check TSH, free T4 levels  Continue the current dosages of levothyroxin and Cytomel.    Menopause  Will check FSH, LH levels  Will check estrogen levels  Given the history of PCOS and obesity for now we'll continue metformin 500 mg twice daily  Will check HbA1c.    Morbid obesity  Discussed with the patient about various weight loss medications  Would consider Qsymia at this time  Gave the medication titration to the patient  Discussed about the side effects and the benefits of the medication  Patient will continue on this medication for the next 3 months and will be evaluated if she has lost any of the weight.    Thank you for asking me to see your patient, John Monk in consultation.       The total time spent  face- to- face was more than 60 min of which greater than 32 min of time ( greater than 50% of  "the total time )  was spent face to face with the patient counseling and coordination of care on above clinical conditions and treatment plan, weight loss medications and their side effects.    Roney Jodran MD  05/15/18    EMR Dragon / transcription disclaimer:     \"Dictated utilizing Dragon dictation\".          "

## 2018-05-16 LAB
25(OH)D3+25(OH)D2 SERPL-MCNC: 20.2 NG/ML (ref 30–100)
ALBUMIN SERPL-MCNC: 5 G/DL (ref 3.5–5.2)
ALBUMIN/GLOB SERPL: 1.9 G/DL
ALP SERPL-CCNC: 88 U/L (ref 39–117)
ALT SERPL-CCNC: 24 U/L (ref 1–33)
AST SERPL-CCNC: 17 U/L (ref 1–32)
BILIRUB SERPL-MCNC: 0.5 MG/DL (ref 0.1–1.2)
BUN SERPL-MCNC: 27 MG/DL (ref 6–20)
BUN/CREAT SERPL: 25.7 (ref 7–25)
CALCIUM SERPL-MCNC: 10.3 MG/DL (ref 8.6–10.5)
CHLORIDE SERPL-SCNC: 102 MMOL/L (ref 98–107)
CHOLEST SERPL-MCNC: 238 MG/DL (ref 0–200)
CO2 SERPL-SCNC: 24.7 MMOL/L (ref 22–29)
CREAT SERPL-MCNC: 1.05 MG/DL (ref 0.57–1)
ESTRADIOL SERPL-MCNC: <5 PG/ML
FOLATE SERPL-MCNC: 16.28 NG/ML (ref 4.78–24.2)
FSH SERPL-ACNC: 76.1 MIU/ML
GFR SERPLBLD CREATININE-BSD FMLA CKD-EPI: 55 ML/MIN/1.73
GFR SERPLBLD CREATININE-BSD FMLA CKD-EPI: 67 ML/MIN/1.73
GLOBULIN SER CALC-MCNC: 2.6 GM/DL
GLUCOSE SERPL-MCNC: 85 MG/DL (ref 65–99)
HBA1C MFR BLD: 5.3 % (ref 4.8–5.6)
HDLC SERPL-MCNC: 70 MG/DL (ref 40–60)
INTERPRETATION: NORMAL
LDLC SERPL CALC-MCNC: 145 MG/DL (ref 0–100)
LH SERPL-ACNC: 40.6 MIU/ML
Lab: NORMAL
POTASSIUM SERPL-SCNC: 4.5 MMOL/L (ref 3.5–5.2)
PROT SERPL-MCNC: 7.6 G/DL (ref 6–8.5)
SODIUM SERPL-SCNC: 142 MMOL/L (ref 136–145)
T4 FREE SERPL-MCNC: 1.46 NG/DL (ref 0.93–1.7)
TRIGL SERPL-MCNC: 114 MG/DL (ref 0–150)
TSH SERPL DL<=0.005 MIU/L-ACNC: 0.26 MIU/ML (ref 0.27–4.2)
VIT B12 SERPL-MCNC: 510 PG/ML (ref 211–946)
VLDLC SERPL CALC-MCNC: 22.8 MG/DL (ref 5–40)

## 2018-05-17 ENCOUNTER — TELEPHONE (OUTPATIENT)
Dept: ENDOCRINOLOGY | Age: 53
End: 2018-05-17

## 2018-05-17 NOTE — TELEPHONE ENCOUNTER
SPOKE WITH PATIENT ABOUT LABS RESULTS          ----- Message from Roney Jordan MD sent at 5/16/2018  4:26 PM EDT -----  Regarding: RE: LABS  Bcoz of her Cr levels we don’t want her to start the Qysimia..   she needs to contact her pcp regarding her creatinine levels. She could be dehydrated or it could be due to her kidney stones.   She needs to discuss this with her pcp.  ----- Message -----  From: Yolanda Corcoran MA  Sent: 5/16/2018   3:38 PM  To: Roney Jordan MD  Subject: LABS                                             Patient had concern about her CMP results  She was wondering what goes her Cr level to be elevated and why her eGFR was low.

## 2018-07-10 ENCOUNTER — OFFICE VISIT (OUTPATIENT)
Dept: INTERNAL MEDICINE | Facility: CLINIC | Age: 53
End: 2018-07-10

## 2018-07-10 VITALS
SYSTOLIC BLOOD PRESSURE: 116 MMHG | OXYGEN SATURATION: 98 % | BODY MASS INDEX: 38.53 KG/M2 | HEART RATE: 68 BPM | DIASTOLIC BLOOD PRESSURE: 80 MMHG | WEIGHT: 246 LBS

## 2018-07-10 DIAGNOSIS — L30.9 DERMATITIS: ICD-10-CM

## 2018-07-10 DIAGNOSIS — E53.8 VITAMIN B12 DEFICIENCY: ICD-10-CM

## 2018-07-10 DIAGNOSIS — E03.9 HYPOTHYROIDISM, ADULT: ICD-10-CM

## 2018-07-10 DIAGNOSIS — I10 ESSENTIAL HYPERTENSION: Primary | ICD-10-CM

## 2018-07-10 LAB
ANION GAP SERPL CALCULATED.3IONS-SCNC: 13.4 MMOL/L
BUN BLD-MCNC: 11 MG/DL (ref 6–20)
BUN/CREAT SERPL: 12.5 (ref 7–25)
CALCIUM SPEC-SCNC: 9.6 MG/DL (ref 8.6–10.5)
CHLORIDE SERPL-SCNC: 100 MMOL/L (ref 98–107)
CO2 SERPL-SCNC: 27.6 MMOL/L (ref 22–29)
CREAT BLD-MCNC: 0.88 MG/DL (ref 0.57–1)
GFR SERPL CREATININE-BSD FRML MDRD: 68 ML/MIN/1.73
GLUCOSE BLD-MCNC: 84 MG/DL (ref 65–99)
POTASSIUM BLD-SCNC: 4.7 MMOL/L (ref 3.5–5.2)
SODIUM BLD-SCNC: 141 MMOL/L (ref 136–145)
TSH SERPL-ACNC: 0.57 MIU/ML (ref 0.27–4.2)

## 2018-07-10 PROCEDURE — 96372 THER/PROPH/DIAG INJ SC/IM: CPT | Performed by: NURSE PRACTITIONER

## 2018-07-10 PROCEDURE — 99214 OFFICE O/P EST MOD 30 MIN: CPT | Performed by: NURSE PRACTITIONER

## 2018-07-10 PROCEDURE — 80048 BASIC METABOLIC PNL TOTAL CA: CPT | Performed by: NURSE PRACTITIONER

## 2018-07-10 RX ORDER — BUPROPION HYDROCHLORIDE 300 MG/1
300 TABLET ORAL DAILY
Qty: 90 TABLET | Refills: 1 | Status: SHIPPED | OUTPATIENT
Start: 2018-07-10 | End: 2018-10-16 | Stop reason: SDUPTHER

## 2018-07-10 RX ORDER — CYANOCOBALAMIN 1000 UG/ML
1000 INJECTION, SOLUTION INTRAMUSCULAR; SUBCUTANEOUS
Status: DISCONTINUED | OUTPATIENT
Start: 2018-07-10 | End: 2018-10-16 | Stop reason: SDUPTHER

## 2018-07-10 RX ADMIN — CYANOCOBALAMIN 1000 MCG: 1000 INJECTION, SOLUTION INTRAMUSCULAR; SUBCUTANEOUS at 15:15

## 2018-07-10 NOTE — PROGRESS NOTES
Subjective   John Monk is a 52 y.o. female.     She saw endocrinologist about 4 weeks ago and noted to have reduced kidney function. She has been hydrated since. She does make mention that at that time she had started keto diet. She also started benicar-hct 2/2018.       Hypertension   This is a chronic problem. The problem is controlled. Pertinent negatives include no anxiety, blurred vision, chest pain, headaches, orthopnea, palpitations, peripheral edema, PND or shortness of breath. Current antihypertension treatment includes angiotensin blockers and diuretics. The current treatment provides significant improvement. Hypertensive end-organ damage includes kidney disease.        The following portions of the patient's history were reviewed and updated as appropriate: allergies, current medications, past family history, past medical history, past social history, past surgical history and problem list.    Review of Systems   Constitutional: Negative for activity change, appetite change, fatigue and fever.   Eyes: Negative for blurred vision.   Respiratory: Negative for cough, shortness of breath and wheezing.    Cardiovascular: Negative for chest pain, palpitations, orthopnea, leg swelling and PND.   Skin: Positive for rash (intermittent to chest which responds well to eucrisa ).   Neurological: Negative for dizziness, numbness and headaches.       Objective   Physical Exam   Constitutional: She is oriented to person, place, and time. She appears well-developed and well-nourished.   HENT:   Head: Normocephalic.   Nose: Nose normal.   Cardiovascular: Regular rhythm and normal heart sounds.  Exam reveals no S3 and no S4.    No murmur heard.  No pedal edema   Repeat bp left arm 122/78   Pulmonary/Chest: Effort normal and breath sounds normal. She has no decreased breath sounds. She has no wheezes. She has no rhonchi. She has no rales.   Musculoskeletal: She exhibits no edema.   Lymphadenopathy:        Head (right  side): No submental, no submandibular and no tonsillar adenopathy present.   Neurological: She is alert and oriented to person, place, and time. Gait normal.   Skin: Skin is warm and dry.   Psychiatric: She has a normal mood and affect.       Assessment/Plan   John was seen today for hypertension.    Diagnoses and all orders for this visit:    Essential hypertension  Comments:  goal of bp less than 140/90; low salt diet   Orders:  -     Basic Metabolic Panel; Future  -     Basic Metabolic Panel    Dermatitis  -     Crisaborole (EUCRISA) 2 % ointment; Apply 1 application topically 2 (Two) Times a Day.    Hypothyroidism, adult  -     TSH Rfx On Abnormal To Free T4; Future  -     TSH Rfx On Abnormal To Free T4    Vitamin B12 deficiency  -     cyanocobalamin injection 1,000 mcg; Inject 1 mL into the shoulder, thigh, or buttocks Every 28 (Twenty-Eight) Days.      Will check labs and adjust medications if needed.

## 2018-07-12 ENCOUNTER — TELEPHONE (OUTPATIENT)
Dept: ENDOCRINOLOGY | Age: 53
End: 2018-07-12

## 2018-07-12 NOTE — TELEPHONE ENCOUNTER
SPOKE WITH PHARMACY  TO CALL IN THE BELOW MEDICATION AND GIVE PT BMI      ----- Message from Mimi Martinez sent at 7/12/2018  8:34 AM EDT -----  Contact: [facility]    PHARMACY NEEDS CLARIFICATION ON A MEDICATION     MEDICATION:  Phentermine-Topiramate (QSYMIA)       MESSAGE:  [facility] HAS CALLED IN REGARDS TO GETTING CLARIFICATION/CORRECTIONS MADE FOR THE ABOVE MEDICATION.      THE PHARMACIST IS STATING THE ABOVE MEDICATION NEEDS TO START OUT AT LOWER DOSAGE OF 3.75 MG  AND THEN INCREASE THE DOSAGE TO 7.5. THEY ALSO NEED THE PATIENT BMI ALSO TO BE SEND OVER ON THERE PRESCRIPTION.       ANY QUESTION , PLEASE CALL THE PHARMACY DIRECTLY   PHONE NUMBER:  [phone]

## 2018-07-16 ENCOUNTER — TELEPHONE (OUTPATIENT)
Dept: INTERNAL MEDICINE | Facility: CLINIC | Age: 53
End: 2018-07-16

## 2018-07-16 NOTE — TELEPHONE ENCOUNTER
Please call patient and see if she wants to pay out of pocket (I provided coupon in office last week). Thanks

## 2018-07-16 NOTE — TELEPHONE ENCOUNTER
PA for Eucrisa denied.  The indicated use does not meet the Service Benefit Plan's criteria for medical necessity.  Please advise.

## 2018-08-13 ENCOUNTER — RESULTS ENCOUNTER (OUTPATIENT)
Dept: ENDOCRINOLOGY | Age: 53
End: 2018-08-13

## 2018-08-13 DIAGNOSIS — E66.09 CLASS 2 OBESITY DUE TO EXCESS CALORIES WITHOUT SERIOUS COMORBIDITY WITH BODY MASS INDEX (BMI) OF 36.0 TO 36.9 IN ADULT: ICD-10-CM

## 2018-08-13 DIAGNOSIS — E28.2 POLYCYSTIC OVARIAN SYNDROME: ICD-10-CM

## 2018-08-13 DIAGNOSIS — E03.9 HYPOTHYROIDISM, ADULT: ICD-10-CM

## 2018-09-14 RX ORDER — LIOTHYRONINE SODIUM 5 UG/1
5 TABLET ORAL DAILY
Qty: 90 TABLET | Refills: 0 | Status: SHIPPED | OUTPATIENT
Start: 2018-09-14 | End: 2018-12-06 | Stop reason: SDUPTHER

## 2018-09-14 RX ORDER — OLMESARTAN MEDOXOMIL AND HYDROCHLOROTHIAZIDE 20/12.5 20; 12.5 MG/1; MG/1
1 TABLET ORAL DAILY
Qty: 90 TABLET | Refills: 0 | Status: SHIPPED | OUTPATIENT
Start: 2018-09-14 | End: 2018-12-06 | Stop reason: SDUPTHER

## 2018-09-28 DIAGNOSIS — Z12.31 VISIT FOR SCREENING MAMMOGRAM: Primary | ICD-10-CM

## 2018-10-01 RX ORDER — ERGOCALCIFEROL 1.25 MG/1
CAPSULE ORAL
Qty: 12 CAPSULE | Refills: 0 | Status: SHIPPED | OUTPATIENT
Start: 2018-10-01 | End: 2018-12-06 | Stop reason: SDUPTHER

## 2018-10-08 ENCOUNTER — OFFICE VISIT (OUTPATIENT)
Dept: INTERNAL MEDICINE | Facility: CLINIC | Age: 53
End: 2018-10-08

## 2018-10-08 ENCOUNTER — APPOINTMENT (OUTPATIENT)
Dept: WOMENS IMAGING | Facility: HOSPITAL | Age: 53
End: 2018-10-08

## 2018-10-08 DIAGNOSIS — Z12.31 VISIT FOR SCREENING MAMMOGRAM: ICD-10-CM

## 2018-10-08 PROCEDURE — 77067 SCR MAMMO BI INCL CAD: CPT | Performed by: INTERNAL MEDICINE

## 2018-10-08 PROCEDURE — 77067 SCR MAMMO BI INCL CAD: CPT | Performed by: RADIOLOGY

## 2018-10-16 ENCOUNTER — OFFICE VISIT (OUTPATIENT)
Dept: INTERNAL MEDICINE | Facility: CLINIC | Age: 53
End: 2018-10-16

## 2018-10-16 VITALS
WEIGHT: 245 LBS | DIASTOLIC BLOOD PRESSURE: 82 MMHG | HEIGHT: 67 IN | BODY MASS INDEX: 38.45 KG/M2 | SYSTOLIC BLOOD PRESSURE: 128 MMHG

## 2018-10-16 DIAGNOSIS — F90.8 ATTENTION DEFICIT HYPERACTIVITY DISORDER (ADHD), OTHER TYPE: ICD-10-CM

## 2018-10-16 DIAGNOSIS — I10 ESSENTIAL HYPERTENSION: Primary | ICD-10-CM

## 2018-10-16 DIAGNOSIS — E03.9 HYPOTHYROIDISM, ADULT: ICD-10-CM

## 2018-10-16 DIAGNOSIS — E53.8 VITAMIN B 12 DEFICIENCY: ICD-10-CM

## 2018-10-16 LAB
ALBUMIN SERPL-MCNC: 4.5 G/DL (ref 3.5–5.2)
ALBUMIN/GLOB SERPL: 1.6 G/DL
ALP SERPL-CCNC: 73 U/L (ref 39–117)
ALT SERPL W P-5'-P-CCNC: 19 U/L (ref 1–33)
ANION GAP SERPL CALCULATED.3IONS-SCNC: 12.3 MMOL/L
AST SERPL-CCNC: 14 U/L (ref 1–32)
BILIRUB SERPL-MCNC: 0.3 MG/DL (ref 0.1–1.2)
BUN BLD-MCNC: 13 MG/DL (ref 6–20)
BUN/CREAT SERPL: 16.7 (ref 7–25)
CALCIUM SPEC-SCNC: 10.1 MG/DL (ref 8.6–10.5)
CHLORIDE SERPL-SCNC: 101 MMOL/L (ref 98–107)
CHOLEST SERPL-MCNC: 198 MG/DL (ref 0–200)
CO2 SERPL-SCNC: 25.7 MMOL/L (ref 22–29)
CREAT BLD-MCNC: 0.78 MG/DL (ref 0.57–1)
GFR SERPL CREATININE-BSD FRML MDRD: 78 ML/MIN/1.73
GLOBULIN UR ELPH-MCNC: 2.9 GM/DL
GLUCOSE BLD-MCNC: 85 MG/DL (ref 65–99)
HDLC SERPL-MCNC: 54 MG/DL (ref 40–60)
LDLC SERPL CALC-MCNC: 105 MG/DL (ref 0–100)
LDLC/HDLC SERPL: 1.94 {RATIO}
POTASSIUM BLD-SCNC: 4.3 MMOL/L (ref 3.5–5.2)
PROT SERPL-MCNC: 7.4 G/DL (ref 6–8.5)
SODIUM BLD-SCNC: 139 MMOL/L (ref 136–145)
TRIGL SERPL-MCNC: 195 MG/DL (ref 0–150)
TSH SERPL DL<=0.05 MIU/L-ACNC: 1.01 MIU/ML (ref 0.27–4.2)
VLDLC SERPL-MCNC: 39 MG/DL (ref 5–40)

## 2018-10-16 PROCEDURE — 80061 LIPID PANEL: CPT | Performed by: INTERNAL MEDICINE

## 2018-10-16 PROCEDURE — 36415 COLL VENOUS BLD VENIPUNCTURE: CPT | Performed by: INTERNAL MEDICINE

## 2018-10-16 PROCEDURE — 84443 ASSAY THYROID STIM HORMONE: CPT | Performed by: INTERNAL MEDICINE

## 2018-10-16 PROCEDURE — 80053 COMPREHEN METABOLIC PANEL: CPT | Performed by: INTERNAL MEDICINE

## 2018-10-16 PROCEDURE — 99214 OFFICE O/P EST MOD 30 MIN: CPT | Performed by: INTERNAL MEDICINE

## 2018-10-16 PROCEDURE — 96372 THER/PROPH/DIAG INJ SC/IM: CPT | Performed by: INTERNAL MEDICINE

## 2018-10-16 RX ORDER — CYANOCOBALAMIN 1000 UG/ML
1000 INJECTION, SOLUTION INTRAMUSCULAR; SUBCUTANEOUS
Status: DISCONTINUED | OUTPATIENT
Start: 2018-10-16 | End: 2021-07-13

## 2018-10-16 RX ORDER — BUPROPION HYDROCHLORIDE 300 MG/1
300 TABLET ORAL DAILY
Qty: 90 TABLET | Refills: 1 | Status: SHIPPED | OUTPATIENT
Start: 2018-10-16 | End: 2019-09-11 | Stop reason: SDUPTHER

## 2018-10-16 RX ORDER — BUPROPION HYDROCHLORIDE 150 MG/1
150 TABLET ORAL DAILY
Qty: 90 TABLET | Refills: 1 | Status: SHIPPED | OUTPATIENT
Start: 2018-10-16 | End: 2019-05-28 | Stop reason: SDUPTHER

## 2018-10-16 RX ADMIN — CYANOCOBALAMIN 1000 MCG: 1000 INJECTION, SOLUTION INTRAMUSCULAR; SUBCUTANEOUS at 14:43

## 2018-10-16 NOTE — PROGRESS NOTES
Chief Complaint   Patient presents with   • Hypertension   • Hypothyroidism   • Leg Pain   • Plantar Fasciitis       Subjective   John Monk is a 53 y.o. female.     She has had problems recently with left knee swelling. She history of meniscus problem. She will see her orthopedist for this. She has recently been diagnosed with plantar fasciitis. She saw her podiatrist today.      Hypertension   This is a chronic problem. The problem is controlled. Pertinent negatives include no blurred vision, chest pain, headaches, orthopnea, palpitations, peripheral edema, PND or shortness of breath. Current antihypertension treatment includes angiotensin blockers, diuretics and lifestyle changes. The current treatment provides moderate improvement. Compliance problems include diet and exercise.  There is no history of CAD/MI or CVA.   Hypothyroidism   Pertinent negatives include no chest pain, coughing or headaches.        Hypothyroidism: Current treatment levothyroxine. She is compliant with the medication, tolerates it well and reports good control of symptoms of hypothyroidism.  She has not noted any change in tolerance of heat or cold.  No change in hair or skin.  No constipation.  No symptoms of hyperthyroidism.      ADD:  She comes in for follow up of ADD.  This is a chronic problem.  Symptoms include easy distractibility, poor concentration, short attention span, forgetfulness.  This happens at work.  Sometimes also at home.    Exacerbating factors include fatigue, distracting activities.  Associated symptoms negative for anorexia, weight loss, sleep disturbance, anxiety, irritability  Current treatment: Bupropion xl.  .By report, there is good compliance with treatment, good tolerance of treatment.  She would like to try a higher dose of bupropion.     The following portions of the patient's history were reviewed and updated as appropriate: allergies, current medications, past family history, past medical history, past  "social history, past surgical history and problem list.    Review of Systems   Constitutional: Negative for appetite change.   HENT: Negative for nosebleeds.    Eyes: Negative for blurred vision and double vision.   Respiratory: Negative for cough and shortness of breath.    Cardiovascular: Negative for chest pain, palpitations, orthopnea, leg swelling and PND.         Current Outpatient Prescriptions:   •  buPROPion XL (WELLBUTRIN XL) 300 MG 24 hr tablet, Take 1 tablet by mouth Daily., Disp: 90 tablet, Rfl: 1  •  cyanocobalamin 1000 MCG/ML injection, Cyanocobalamin 1000 MCG/ML Injection Solution; Patient Sig: Cyanocobalamin 1000 MCG/ML Injection Solution INJECT 1 ML IN THE MUSCLE ONCE WEEKLY; 12; 0; 08-Apr-2014; Active; APPROVED BY DR. BARAJAS ON 1/15/15, Disp: , Rfl:   •  levothyroxine (SYNTHROID, LEVOTHROID) 137 MCG tablet, Take 1 tablet by mouth Daily., Disp: 90 tablet, Rfl: 3  •  liothyronine (CYTOMEL) 5 MCG tablet, TAKE 1 TABLET BY MOUTH DAILY, Disp: 90 tablet, Rfl: 0  •  metFORMIN ER (GLUCOPHAGE-XR) 500 MG 24 hr tablet, Take 1 tablet by mouth 2 (Two) Times a Day., Disp: 180 tablet, Rfl: 1  •  olmesartan-hydrochlorothiazide (BENICAR HCT) 20-12.5 MG per tablet, TAKE 1 TABLET BY MOUTH DAILY, Disp: 90 tablet, Rfl: 0  •  omeprazole (priLOSEC) 40 MG capsule, Take 1 capsule by mouth Daily., Disp: 90 capsule, Rfl: 3  •  vitamin D (ERGOCALCIFEROL) 77658 units capsule capsule, TAKE 1 CAPSULE BY MOUTH EVERY 7 DAYS, Disp: 12 capsule, Rfl: 0  •  buPROPion XL (WELLBUTRIN XL) 150 MG 24 hr tablet, Take 1 tablet by mouth Daily., Disp: 90 tablet, Rfl: 1    Current Facility-Administered Medications:   •  cyanocobalamin injection 1,000 mcg, 1,000 mcg, Intramuscular, Q28 Days, Colette Lancaster MD, 1,000 mcg at 10/16/18 1443        Objective     /82 (BP Location: Left arm, Patient Position: Sitting, Cuff Size: Large Adult)   Ht 170.2 cm (67\")   Wt 111 kg (245 lb)   BMI 38.37 kg/m²     Physical Exam   Constitutional: She is " oriented to person, place, and time. She appears well-developed and well-nourished. No distress.   Neck: Normal carotid pulses present. Carotid bruit is not present.   Cardiovascular: Regular rhythm, S1 normal and S2 normal.  Exam reveals no gallop and no friction rub.    No murmur heard.  Pulses:       Carotid pulses are 2+ on the right side, and 2+ on the left side.  Pulmonary/Chest: Effort normal and breath sounds normal. She has no wheezes. She has no rhonchi. She has no rales. Chest wall is not dull to percussion.   Musculoskeletal: She exhibits no edema.        Left knee: She exhibits swelling.   Neurological: She is alert and oriented to person, place, and time.   Skin: Skin is warm and dry.   Nursing note and vitals reviewed.        Assessment/Plan   John was seen today for hypertension, hypothyroidism, leg pain and plantar fasciitis.    Diagnoses and all orders for this visit:    Essential hypertension  -     Comprehensive Metabolic Panel; Future  -     Lipid Panel; Future  -     Comprehensive Metabolic Panel  -     Lipid Panel    Hypothyroidism, adult  -     TSH Rfx On Abnormal To Free T4; Future  -     TSH Rfx On Abnormal To Free T4    Attention deficit hyperactivity disorder (ADHD), other type    Vitamin B 12 deficiency  -     cyanocobalamin injection 1,000 mcg; Inject 1 mL into the appropriate muscle as directed by prescriber Every 28 (Twenty-Eight) Days.    Other orders  -     buPROPion XL (WELLBUTRIN XL) 300 MG 24 hr tablet; Take 1 tablet by mouth Daily.  -     buPROPion XL (WELLBUTRIN XL) 150 MG 24 hr tablet; Take 1 tablet by mouth Daily.    She will follow-up with orthopedist in regard to her knee pain.  Continue using wraps as recommended by her podiatrist for plantar fasciitis.

## 2018-10-22 RX ORDER — OMEPRAZOLE 40 MG/1
40 CAPSULE, DELAYED RELEASE ORAL DAILY
Qty: 90 CAPSULE | Refills: 1 | Status: SHIPPED | OUTPATIENT
Start: 2018-10-22 | End: 2020-06-30

## 2018-10-29 RX ORDER — CYANOCOBALAMIN 1000 UG/ML
INJECTION, SOLUTION INTRAMUSCULAR; SUBCUTANEOUS
Qty: 12 ML | Refills: 0 | Status: SHIPPED | OUTPATIENT
Start: 2018-10-29 | End: 2019-11-21 | Stop reason: SDUPTHER

## 2018-12-06 RX ORDER — OLMESARTAN MEDOXOMIL AND HYDROCHLOROTHIAZIDE 20/12.5 20; 12.5 MG/1; MG/1
1 TABLET ORAL DAILY
Qty: 90 TABLET | Refills: 1 | Status: SHIPPED | OUTPATIENT
Start: 2018-12-06 | End: 2019-08-17 | Stop reason: SDUPTHER

## 2018-12-06 RX ORDER — ERGOCALCIFEROL 1.25 MG/1
CAPSULE ORAL
Qty: 12 CAPSULE | Refills: 1 | Status: SHIPPED | OUTPATIENT
Start: 2018-12-06 | End: 2019-08-17 | Stop reason: SDUPTHER

## 2018-12-06 RX ORDER — LIOTHYRONINE SODIUM 5 UG/1
5 TABLET ORAL DAILY
Qty: 90 TABLET | Refills: 1 | Status: SHIPPED | OUTPATIENT
Start: 2018-12-06 | End: 2019-11-16 | Stop reason: SDUPTHER

## 2019-02-20 ENCOUNTER — OFFICE VISIT (OUTPATIENT)
Dept: INTERNAL MEDICINE | Facility: CLINIC | Age: 54
End: 2019-02-20

## 2019-02-20 VITALS
SYSTOLIC BLOOD PRESSURE: 120 MMHG | BODY MASS INDEX: 35.63 KG/M2 | HEIGHT: 67 IN | WEIGHT: 227 LBS | DIASTOLIC BLOOD PRESSURE: 80 MMHG

## 2019-02-20 DIAGNOSIS — E28.2 POLYCYSTIC OVARIAN SYNDROME: ICD-10-CM

## 2019-02-20 DIAGNOSIS — I10 ESSENTIAL HYPERTENSION: Primary | ICD-10-CM

## 2019-02-20 DIAGNOSIS — E03.9 HYPOTHYROIDISM, ADULT: ICD-10-CM

## 2019-02-20 LAB
ALBUMIN SERPL-MCNC: 4.6 G/DL (ref 3.5–5.2)
ALBUMIN/GLOB SERPL: 1.6 G/DL
ALP SERPL-CCNC: 64 U/L (ref 39–117)
ALT SERPL W P-5'-P-CCNC: 18 U/L (ref 1–33)
ANION GAP SERPL CALCULATED.3IONS-SCNC: 17.9 MMOL/L
AST SERPL-CCNC: 10 U/L (ref 1–32)
BILIRUB SERPL-MCNC: 0.5 MG/DL (ref 0.1–1.2)
BUN BLD-MCNC: 12 MG/DL (ref 6–20)
BUN/CREAT SERPL: 16.4 (ref 7–25)
CALCIUM SPEC-SCNC: 10.4 MG/DL (ref 8.6–10.5)
CHLORIDE SERPL-SCNC: 95 MMOL/L (ref 98–107)
CHOLEST SERPL-MCNC: 215 MG/DL (ref 0–200)
CO2 SERPL-SCNC: 25.1 MMOL/L (ref 22–29)
CREAT BLD-MCNC: 0.73 MG/DL (ref 0.57–1)
GFR SERPL CREATININE-BSD FRML MDRD: 84 ML/MIN/1.73
GLOBULIN UR ELPH-MCNC: 2.9 GM/DL
GLUCOSE BLD-MCNC: 76 MG/DL (ref 65–99)
HDLC SERPL-MCNC: 64 MG/DL (ref 40–60)
LDLC SERPL CALC-MCNC: 129 MG/DL (ref 0–100)
LDLC/HDLC SERPL: 2.02 {RATIO}
POTASSIUM BLD-SCNC: 4.5 MMOL/L (ref 3.5–5.2)
PROT SERPL-MCNC: 7.5 G/DL (ref 6–8.5)
SODIUM BLD-SCNC: 138 MMOL/L (ref 136–145)
TRIGL SERPL-MCNC: 108 MG/DL (ref 0–150)
VLDLC SERPL-MCNC: 21.6 MG/DL (ref 5–40)

## 2019-02-20 PROCEDURE — 80053 COMPREHEN METABOLIC PANEL: CPT | Performed by: INTERNAL MEDICINE

## 2019-02-20 PROCEDURE — 80061 LIPID PANEL: CPT | Performed by: INTERNAL MEDICINE

## 2019-02-20 PROCEDURE — 99213 OFFICE O/P EST LOW 20 MIN: CPT | Performed by: INTERNAL MEDICINE

## 2019-02-20 PROCEDURE — 96372 THER/PROPH/DIAG INJ SC/IM: CPT | Performed by: INTERNAL MEDICINE

## 2019-02-20 RX ADMIN — CYANOCOBALAMIN 1000 MCG: 1000 INJECTION, SOLUTION INTRAMUSCULAR; SUBCUTANEOUS at 15:48

## 2019-02-20 NOTE — PROGRESS NOTES
Chief Complaint   Patient presents with   • Hypertension     4 month follow up   • Hypothyroidism   • ADD       Subjective   John Monk is a 53 y.o. female.     Hypertension   This is a chronic problem. The problem is unchanged. The problem is controlled. Pertinent negatives include no blurred vision, chest pain, headaches, orthopnea, palpitations, peripheral edema, PND or shortness of breath. There are no associated agents to hypertension. Current antihypertension treatment includes angiotensin blockers, diuretics and lifestyle changes. The current treatment provides significant improvement. There are no compliance problems.    Hypothyroidism   This is a chronic problem. The problem has been unchanged. Pertinent negatives include no chest pain, coughing or headaches. Associated symptoms comments: No changes in the way she tolerates heat or cold.  No palpitations.  No constipation.. Treatments tried: Levothyroxine and liothyronine. The treatment provided significant relief.   ADD   This is a chronic problem. Pertinent negatives include no chest pain, coughing or headaches. Treatments tried: Bupropion XL. The treatment provided significant relief.        She is following the keto diet.  She has lost weight.     The following portions of the patient's history were reviewed and updated as appropriate: allergies, current medications, past family history, past medical history, past social history, past surgical history and problem list.    Review of Systems   Constitutional: Negative for appetite change.   HENT: Negative for nosebleeds.    Eyes: Negative for blurred vision and double vision.   Respiratory: Negative for cough and shortness of breath.    Cardiovascular: Negative for chest pain, palpitations, orthopnea, leg swelling and PND.         Current Outpatient Medications:   •  buPROPion XL (WELLBUTRIN XL) 150 MG 24 hr tablet, Take 1 tablet by mouth Daily., Disp: 90 tablet, Rfl: 1  •  buPROPion XL (WELLBUTRIN XL)  "300 MG 24 hr tablet, Take 1 tablet by mouth Daily., Disp: 90 tablet, Rfl: 1  •  cyanocobalamin 1000 MCG/ML injection, INJECT CONTENTS OF 1 VIAL EVERY WEEK X 4 WEEKS, THEN ONCE MONTHLY THEREAFTER, Disp: 12 mL, Rfl: 0  •  levothyroxine (SYNTHROID, LEVOTHROID) 137 MCG tablet, Take 1 tablet by mouth Daily., Disp: 90 tablet, Rfl: 3  •  liothyronine (CYTOMEL) 5 MCG tablet, TAKE 1 TABLET BY MOUTH DAILY, Disp: 90 tablet, Rfl: 1  •  olmesartan-hydrochlorothiazide (BENICAR HCT) 20-12.5 MG per tablet, Take 1 tablet by mouth Daily., Disp: 90 tablet, Rfl: 1  •  omeprazole (priLOSEC) 40 MG capsule, TAKE 1 CAPSULE BY MOUTH DAILY, Disp: 90 capsule, Rfl: 1  •  vitamin D (ERGOCALCIFEROL) 79321 units capsule capsule, TAKE 1 CAPSULE BY MOUTH EVERY 7 DAYS, Disp: 12 capsule, Rfl: 1    Current Facility-Administered Medications:   •  cyanocobalamin injection 1,000 mcg, 1,000 mcg, Intramuscular, Q28 Days, Colette Lancaster MD, 1,000 mcg at 10/16/18 1443        Objective     /80 (BP Location: Right arm, Patient Position: Sitting, Cuff Size: Adult)   Ht 169.5 cm (66.75\")   Wt 103 kg (227 lb)   BMI 35.82 kg/m²     Physical Exam   Constitutional: She is oriented to person, place, and time. She appears well-developed and well-nourished. No distress.   Neck: Normal carotid pulses present. Carotid bruit is not present.   Cardiovascular: Regular rhythm, S1 normal and S2 normal. Exam reveals no gallop and no friction rub.   No murmur heard.  Pulses:       Carotid pulses are 2+ on the right side, and 2+ on the left side.  Pulmonary/Chest: Effort normal and breath sounds normal. She has no wheezes. She has no rhonchi. She has no rales. Chest wall is not dull to percussion.   Musculoskeletal: She exhibits no edema.   Neurological: She is alert and oriented to person, place, and time.   Skin: Skin is warm and dry.   Nursing note and vitals reviewed.        Assessment/Plan   Amal was seen today for hypertension, hypothyroidism and add.    Diagnoses " and all orders for this visit:    Essential hypertension  -     Comprehensive Metabolic Panel; Future  -     Lipid Panel; Future  -     Comprehensive Metabolic Panel  -     Lipid Panel    Hypothyroidism, adult  -     TSH Rfx On Abnormal To Free T4; Future  -     TSH Rfx On Abnormal To Free T4    Polycystic ovarian syndrome  -     Hemoglobin A1c; Future  -     Hemoglobin A1c      Initial blood pressure was 94/58.  However, this was taken with a large adult cuff.  Repeat with standard adult cuff 120/80.

## 2019-02-21 LAB
HBA1C MFR BLD: 5.4 % (ref 4.8–5.6)
T4 FREE SERPL-MCNC: 1.82 NG/DL (ref 0.82–1.77)
TSH SERPL-ACNC: 0.11 UIU/ML (ref 0.45–4.5)

## 2019-02-22 RX ORDER — LEVOTHYROXINE SODIUM 0.12 MG/1
125 TABLET ORAL DAILY
Qty: 90 TABLET | Refills: 1
Start: 2019-02-22 | End: 2019-03-25 | Stop reason: SDUPTHER

## 2019-03-25 RX ORDER — LEVOTHYROXINE SODIUM 0.12 MG/1
125 TABLET ORAL DAILY
Qty: 90 TABLET | Refills: 1 | Status: SHIPPED | OUTPATIENT
Start: 2019-03-25 | End: 2019-12-18 | Stop reason: SDUPTHER

## 2019-05-28 RX ORDER — BUPROPION HYDROCHLORIDE 150 MG/1
TABLET ORAL
Qty: 90 TABLET | Refills: 1 | Status: SHIPPED | OUTPATIENT
Start: 2019-05-28 | End: 2019-12-18 | Stop reason: SDUPTHER

## 2019-08-19 RX ORDER — OLMESARTAN MEDOXOMIL AND HYDROCHLOROTHIAZIDE 20/12.5 20; 12.5 MG/1; MG/1
1 TABLET ORAL DAILY
Qty: 90 TABLET | Refills: 0 | Status: SHIPPED | OUTPATIENT
Start: 2019-08-19 | End: 2019-11-16 | Stop reason: SDUPTHER

## 2019-08-19 RX ORDER — ERGOCALCIFEROL 1.25 MG/1
CAPSULE ORAL
Qty: 12 CAPSULE | Refills: 0 | Status: SHIPPED | OUTPATIENT
Start: 2019-08-19 | End: 2019-11-21 | Stop reason: SDUPTHER

## 2019-09-12 RX ORDER — BUPROPION HYDROCHLORIDE 300 MG/1
TABLET ORAL
Qty: 90 TABLET | Refills: 0 | Status: SHIPPED | OUTPATIENT
Start: 2019-09-12 | End: 2019-12-18 | Stop reason: SDUPTHER

## 2019-11-07 ENCOUNTER — APPOINTMENT (OUTPATIENT)
Dept: WOMENS IMAGING | Facility: HOSPITAL | Age: 54
End: 2019-11-07

## 2019-11-07 ENCOUNTER — OFFICE VISIT (OUTPATIENT)
Dept: INTERNAL MEDICINE | Facility: CLINIC | Age: 54
End: 2019-11-07

## 2019-11-07 ENCOUNTER — CLINICAL SUPPORT (OUTPATIENT)
Dept: INTERNAL MEDICINE | Facility: CLINIC | Age: 54
End: 2019-11-07

## 2019-11-07 DIAGNOSIS — Z12.31 ENCOUNTER FOR SCREENING MAMMOGRAM FOR BREAST CANCER: Primary | ICD-10-CM

## 2019-11-07 DIAGNOSIS — Z12.31 ENCOUNTER FOR SCREENING MAMMOGRAM FOR BREAST CANCER: ICD-10-CM

## 2019-11-07 DIAGNOSIS — E53.8 B12 DEFICIENCY: Primary | ICD-10-CM

## 2019-11-07 PROCEDURE — 77067 SCR MAMMO BI INCL CAD: CPT | Performed by: INTERNAL MEDICINE

## 2019-11-07 PROCEDURE — 96372 THER/PROPH/DIAG INJ SC/IM: CPT | Performed by: INTERNAL MEDICINE

## 2019-11-07 PROCEDURE — 77067 SCR MAMMO BI INCL CAD: CPT | Performed by: RADIOLOGY

## 2019-11-07 RX ORDER — CYANOCOBALAMIN 1000 UG/ML
1000 INJECTION, SOLUTION INTRAMUSCULAR; SUBCUTANEOUS
Status: DISCONTINUED | OUTPATIENT
Start: 2019-11-07 | End: 2021-07-13

## 2019-11-07 RX ADMIN — CYANOCOBALAMIN 1000 MCG: 1000 INJECTION, SOLUTION INTRAMUSCULAR; SUBCUTANEOUS at 14:42

## 2019-11-07 NOTE — PROGRESS NOTES
Pt came in office and received 1 ml of vitamin B12 injection in right deltoid, pt tolerated well with no complciation

## 2019-11-18 RX ORDER — LIOTHYRONINE SODIUM 5 UG/1
5 TABLET ORAL DAILY
Qty: 30 TABLET | Refills: 0 | Status: SHIPPED | OUTPATIENT
Start: 2019-11-18 | End: 2019-11-18 | Stop reason: SDUPTHER

## 2019-11-18 RX ORDER — OLMESARTAN MEDOXOMIL AND HYDROCHLOROTHIAZIDE 20/12.5 20; 12.5 MG/1; MG/1
1 TABLET ORAL DAILY
Qty: 30 TABLET | Refills: 0 | Status: SHIPPED | OUTPATIENT
Start: 2019-11-18 | End: 2019-11-18 | Stop reason: SDUPTHER

## 2019-11-18 RX ORDER — LIOTHYRONINE SODIUM 5 UG/1
TABLET ORAL
Qty: 90 TABLET | Refills: 0 | Status: SHIPPED | OUTPATIENT
Start: 2019-11-18 | End: 2019-12-18 | Stop reason: SDUPTHER

## 2019-11-18 RX ORDER — OLMESARTAN MEDOXOMIL AND HYDROCHLOROTHIAZIDE 20/12.5 20; 12.5 MG/1; MG/1
TABLET ORAL
Qty: 90 TABLET | Refills: 0 | Status: SHIPPED | OUTPATIENT
Start: 2019-11-18 | End: 2019-12-16 | Stop reason: SDUPTHER

## 2019-11-21 RX ORDER — CYANOCOBALAMIN 1000 UG/ML
INJECTION, SOLUTION INTRAMUSCULAR; SUBCUTANEOUS
Qty: 12 ML | Refills: 0 | Status: SHIPPED | OUTPATIENT
Start: 2019-11-21 | End: 2021-03-29 | Stop reason: SDUPTHER

## 2019-11-21 RX ORDER — ERGOCALCIFEROL 1.25 MG/1
CAPSULE ORAL
Qty: 12 CAPSULE | Refills: 0 | Status: SHIPPED | OUTPATIENT
Start: 2019-11-21 | End: 2020-02-18

## 2019-12-17 RX ORDER — OLMESARTAN MEDOXOMIL AND HYDROCHLOROTHIAZIDE 20/12.5 20; 12.5 MG/1; MG/1
1 TABLET ORAL DAILY
Qty: 90 TABLET | Refills: 0 | Status: SHIPPED | OUTPATIENT
Start: 2019-12-17 | End: 2019-12-18 | Stop reason: SDUPTHER

## 2019-12-18 ENCOUNTER — OFFICE VISIT (OUTPATIENT)
Dept: INTERNAL MEDICINE | Facility: CLINIC | Age: 54
End: 2019-12-18

## 2019-12-18 VITALS
SYSTOLIC BLOOD PRESSURE: 142 MMHG | HEIGHT: 67 IN | OXYGEN SATURATION: 99 % | HEART RATE: 93 BPM | DIASTOLIC BLOOD PRESSURE: 84 MMHG | BODY MASS INDEX: 35.82 KG/M2

## 2019-12-18 DIAGNOSIS — F90.8 ATTENTION DEFICIT HYPERACTIVITY DISORDER (ADHD), OTHER TYPE: ICD-10-CM

## 2019-12-18 DIAGNOSIS — I10 ESSENTIAL HYPERTENSION: Primary | ICD-10-CM

## 2019-12-18 DIAGNOSIS — L30.9 DERMATITIS: ICD-10-CM

## 2019-12-18 DIAGNOSIS — E53.8 B12 DEFICIENCY: ICD-10-CM

## 2019-12-18 DIAGNOSIS — E03.9 HYPOTHYROIDISM, ADULT: ICD-10-CM

## 2019-12-18 PROCEDURE — 99214 OFFICE O/P EST MOD 30 MIN: CPT | Performed by: INTERNAL MEDICINE

## 2019-12-18 PROCEDURE — 96372 THER/PROPH/DIAG INJ SC/IM: CPT | Performed by: INTERNAL MEDICINE

## 2019-12-18 RX ORDER — BUPROPION HYDROCHLORIDE 150 MG/1
150 TABLET ORAL DAILY
Qty: 90 TABLET | Refills: 1 | Status: SHIPPED | OUTPATIENT
Start: 2019-12-18 | End: 2020-06-30

## 2019-12-18 RX ORDER — LIOTHYRONINE SODIUM 5 UG/1
5 TABLET ORAL DAILY
Qty: 90 TABLET | Refills: 1 | Status: SHIPPED | OUTPATIENT
Start: 2019-12-18 | End: 2020-06-30

## 2019-12-18 RX ORDER — BUPROPION HYDROCHLORIDE 300 MG/1
300 TABLET ORAL DAILY
Qty: 90 TABLET | Refills: 1 | Status: SHIPPED | OUTPATIENT
Start: 2019-12-18 | End: 2020-06-30

## 2019-12-18 RX ORDER — CYANOCOBALAMIN 1000 UG/ML
1000 INJECTION, SOLUTION INTRAMUSCULAR; SUBCUTANEOUS
Status: DISCONTINUED | OUTPATIENT
Start: 2019-12-18 | End: 2021-07-13

## 2019-12-18 RX ORDER — LEVOTHYROXINE SODIUM 0.12 MG/1
125 TABLET ORAL DAILY
Qty: 90 TABLET | Refills: 1 | Status: SHIPPED | OUTPATIENT
Start: 2019-12-18 | End: 2020-03-24

## 2019-12-18 RX ORDER — OLMESARTAN MEDOXOMIL AND HYDROCHLOROTHIAZIDE 20/12.5 20; 12.5 MG/1; MG/1
1 TABLET ORAL DAILY
Qty: 90 TABLET | Refills: 1 | Status: SHIPPED | OUTPATIENT
Start: 2019-12-18 | End: 2020-09-10

## 2019-12-18 RX ADMIN — CYANOCOBALAMIN 1000 MCG: 1000 INJECTION, SOLUTION INTRAMUSCULAR; SUBCUTANEOUS at 10:15

## 2019-12-18 NOTE — PROGRESS NOTES
Chief Complaint   Patient presents with   • Hypothyroidism     follow up   • Hypertension   • ADD   • Rash       Subjective   John oMnk is a 54 y.o. female.     Hypothyroidism   This is a chronic problem. Progression since onset: Controlled. Associated symptoms include a rash. Pertinent negatives include no fever or headaches. Associated symptoms comments: No changes in heat/cold tolerance, no change hair/skin.  No fatigue. . Treatments tried: Levothyroxine and Cytomel. Improvement on treatment: TSH has been normal.   Hypertension   This is a chronic problem. The problem is controlled. Pertinent negatives include no headaches, orthopnea, palpitations, peripheral edema, PND or shortness of breath. Agents associated with hypertension include thyroid hormones. Risk factors for coronary artery disease include dyslipidemia, obesity and sedentary lifestyle. Current antihypertension treatment includes angiotensin blockers, diuretics and lifestyle changes. The current treatment provides significant improvement. Compliance problems include diet and exercise.  There is no history of CAD/MI or CVA.   Rash   This is a recurrent problem. The current episode started more than 1 year ago. The problem is unchanged. The affected locations include the chest. She was exposed to nothing. Pertinent negatives include no fever, joint pain, nail changes or shortness of breath. (No itching, no joint pains.) Treatments tried: Eucrisa. The treatment provided significant relief.    ADD:  She comes in for follow up of ADD.  This is a chronic problem.  Symptoms include easy distractibility, poor concentration, short attention span, forgetfulness.  This happens at work.  Sometimes also at home.  Symptoms are moderate in severity.   Exacerbating factors include fatigue, distracting activities.  Relieving factors include bupropion.  Associated symptoms negative for anorexia, weight loss, sleep disturbance, anxiety, irritability  Current  "treatment bupropion.By report, there is good compliance with treatment, good tolerance of treatment and good symptom control.        The following portions of the patient's history were reviewed and updated as appropriate: allergies, current medications, past family history, past medical history, past social history, past surgical history and problem list.    Review of Systems   Constitutional: Negative for fever.   Respiratory: Negative for shortness of breath.    Cardiovascular: Negative for palpitations, orthopnea and PND.   Musculoskeletal: Negative for joint pain.   Skin: Positive for rash. Negative for nail changes.         Current Outpatient Medications:   •  buPROPion XL (WELLBUTRIN XL) 150 MG 24 hr tablet, Take 1 tablet by mouth Daily., Disp: 90 tablet, Rfl: 1  •  buPROPion XL (WELLBUTRIN XL) 300 MG 24 hr tablet, Take 1 tablet by mouth Daily., Disp: 90 tablet, Rfl: 1  •  cyanocobalamin 1000 MCG/ML injection, INJECT CONTENTS OF 1 VIAL EVERY WEEK X 4 WEEKS, THEN ONCE MONTHLY THEREAFTER, Disp: 12 mL, Rfl: 0  •  levothyroxine (SYNTHROID) 125 MCG tablet, Take 1 tablet by mouth Daily., Disp: 90 tablet, Rfl: 1  •  liothyronine (CYTOMEL) 5 MCG tablet, Take 1 tablet by mouth Daily., Disp: 90 tablet, Rfl: 1  •  olmesartan-hydrochlorothiazide (BENICAR HCT) 20-12.5 MG per tablet, Take 1 tablet by mouth Daily., Disp: 90 tablet, Rfl: 1  •  omeprazole (priLOSEC) 40 MG capsule, TAKE 1 CAPSULE BY MOUTH DAILY, Disp: 90 capsule, Rfl: 1  •  vitamin D (ERGOCALCIFEROL) 1.25 MG (34074 UT) capsule capsule, TAKE 1 CAPSULE BY MOUTH EVERY 7 DAYS, Disp: 12 capsule, Rfl: 0    Current Facility-Administered Medications:   •  cyanocobalamin injection 1,000 mcg, 1,000 mcg, Intramuscular, Q28 Days, Colette Lancaster MD, 1,000 mcg at 02/20/19 1548  •  cyanocobalamin injection 1,000 mcg, 1,000 mcg, Intramuscular, Q28 Days, Colette Lancaster MD, 1,000 mcg at 11/07/19 1442        Objective     /84   Pulse 93   Ht 169.5 cm (66.75\")   SpO2 99% "   BMI 35.82 kg/m²     Physical Exam   Constitutional: She is oriented to person, place, and time. She appears well-developed and well-nourished. No distress.   Neck: Normal carotid pulses present. Carotid bruit is not present.   Cardiovascular: Regular rhythm, S1 normal and S2 normal. Exam reveals no gallop and no friction rub.   No murmur heard.  Pulses:       Carotid pulses are 2+ on the right side, and 2+ on the left side.  Pulmonary/Chest: Effort normal and breath sounds normal. She has no wheezes. She has no rhonchi. She has no rales. Chest wall is not dull to percussion.   Musculoskeletal: She exhibits no edema.   Neurological: She is alert and oriented to person, place, and time.   Skin: Skin is warm and dry. Rash:  Areas of hyperpigmentation on upper torso.   Nursing note and vitals reviewed.        Assessment/Plan   Amal was seen today for hypothyroidism, hypertension, add and rash.    Diagnoses and all orders for this visit:    Essential hypertension    Hypothyroidism, adult    Attention deficit hyperactivity disorder (ADHD), other type    Dermatitis    Other orders  -     buPROPion XL (WELLBUTRIN XL) 150 MG 24 hr tablet; Take 1 tablet by mouth Daily.  -     buPROPion XL (WELLBUTRIN XL) 300 MG 24 hr tablet; Take 1 tablet by mouth Daily.  -     levothyroxine (SYNTHROID) 125 MCG tablet; Take 1 tablet by mouth Daily.  -     liothyronine (CYTOMEL) 5 MCG tablet; Take 1 tablet by mouth Daily.  -     olmesartan-hydrochlorothiazide (BENICAR HCT) 20-12.5 MG per tablet; Take 1 tablet by mouth Daily.    She reports Eucrisa has help with this rash in the past.  Samples given.  She will try it again.  If it does not help, recommended dermatology evaluation.  Prescriptions refilled today.  She will return for physical and fasting lab.

## 2020-01-09 RX ORDER — METFORMIN HYDROCHLORIDE 500 MG/1
500 TABLET, EXTENDED RELEASE ORAL 2 TIMES DAILY
Qty: 180 TABLET | Refills: 1 | Status: SHIPPED | OUTPATIENT
Start: 2020-01-09 | End: 2020-10-28 | Stop reason: SDUPTHER

## 2020-02-18 RX ORDER — ERGOCALCIFEROL 1.25 MG/1
CAPSULE ORAL
Qty: 12 CAPSULE | Refills: 0 | Status: SHIPPED | OUTPATIENT
Start: 2020-02-18 | End: 2020-04-01

## 2020-03-24 RX ORDER — LEVOTHYROXINE SODIUM 0.12 MG/1
125 TABLET ORAL DAILY
Qty: 90 TABLET | Refills: 1 | Status: SHIPPED | OUTPATIENT
Start: 2020-03-24 | End: 2021-02-03

## 2020-04-01 RX ORDER — ERGOCALCIFEROL 1.25 MG/1
CAPSULE ORAL
Qty: 12 CAPSULE | Refills: 0 | Status: SHIPPED | OUTPATIENT
Start: 2020-04-01 | End: 2020-06-30

## 2020-06-30 RX ORDER — ERGOCALCIFEROL 1.25 MG/1
CAPSULE ORAL
Qty: 12 CAPSULE | Refills: 0 | Status: SHIPPED | OUTPATIENT
Start: 2020-06-30 | End: 2020-08-18

## 2020-06-30 RX ORDER — OMEPRAZOLE 40 MG/1
40 CAPSULE, DELAYED RELEASE ORAL DAILY
Qty: 90 CAPSULE | Refills: 1 | Status: SHIPPED | OUTPATIENT
Start: 2020-06-30 | End: 2021-03-29

## 2020-06-30 RX ORDER — BUPROPION HYDROCHLORIDE 300 MG/1
300 TABLET ORAL DAILY
Qty: 90 TABLET | Refills: 1 | Status: SHIPPED | OUTPATIENT
Start: 2020-06-30 | End: 2021-04-15

## 2020-06-30 RX ORDER — BUPROPION HYDROCHLORIDE 150 MG/1
150 TABLET ORAL DAILY
Qty: 90 TABLET | Refills: 1 | Status: SHIPPED | OUTPATIENT
Start: 2020-06-30 | End: 2021-04-15

## 2020-06-30 RX ORDER — LIOTHYRONINE SODIUM 5 UG/1
TABLET ORAL
Qty: 90 TABLET | Refills: 1 | Status: SHIPPED | OUTPATIENT
Start: 2020-06-30 | End: 2020-12-23

## 2020-07-24 ENCOUNTER — TELEPHONE (OUTPATIENT)
Dept: INTERNAL MEDICINE | Facility: CLINIC | Age: 55
End: 2020-07-24

## 2020-07-24 DIAGNOSIS — E53.8 B12 DEFICIENCY: ICD-10-CM

## 2020-07-24 DIAGNOSIS — Z11.59 SCREENING FOR VIRAL DISEASE: ICD-10-CM

## 2020-07-24 DIAGNOSIS — I10 ESSENTIAL HYPERTENSION: Primary | ICD-10-CM

## 2020-07-24 DIAGNOSIS — E03.9 HYPOTHYROIDISM, ADULT: ICD-10-CM

## 2020-07-24 NOTE — TELEPHONE ENCOUNTER
PATIENT HAD TO CANCEL AND RESCHEDULE HER PHYSICAL FROM 7-27-20 TO NEXT JANUARY DUE TO DR GIRON'S SCHEDULE BUT SHE WANTED TO HAVE HER LAB LEVELS CHECKED NEXT MONTH AND WANTED TO SEE IF AN ORDER COULD BE PLACED FOR HER TO DO LABS ONLY OR IF NEEDED SEE ONE OF THE OTHER PROVIDERS JUST TO CHECK AND DISCUSS LAB RESULTS AFTER THEY COME IN.   PLEASE ADVISE     PATIENT CALL BACK NUMBER [phone] AND WOULD LIKE A CALL BACK

## 2020-08-03 ENCOUNTER — RESULTS ENCOUNTER (OUTPATIENT)
Dept: INTERNAL MEDICINE | Facility: CLINIC | Age: 55
End: 2020-08-03

## 2020-08-03 DIAGNOSIS — I10 ESSENTIAL HYPERTENSION: ICD-10-CM

## 2020-08-03 DIAGNOSIS — Z11.59 SCREENING FOR VIRAL DISEASE: ICD-10-CM

## 2020-08-03 DIAGNOSIS — E53.8 B12 DEFICIENCY: ICD-10-CM

## 2020-08-03 DIAGNOSIS — E03.9 HYPOTHYROIDISM, ADULT: ICD-10-CM

## 2020-08-06 ENCOUNTER — CLINICAL SUPPORT (OUTPATIENT)
Dept: INTERNAL MEDICINE | Facility: CLINIC | Age: 55
End: 2020-08-06

## 2020-08-06 DIAGNOSIS — E53.8 B12 DEFICIENCY: Primary | ICD-10-CM

## 2020-08-06 PROCEDURE — 96372 THER/PROPH/DIAG INJ SC/IM: CPT | Performed by: INTERNAL MEDICINE

## 2020-08-06 RX ORDER — CYANOCOBALAMIN 1000 UG/ML
1000 INJECTION, SOLUTION INTRAMUSCULAR; SUBCUTANEOUS
Status: DISCONTINUED | OUTPATIENT
Start: 2020-08-06 | End: 2021-07-13

## 2020-08-06 RX ADMIN — CYANOCOBALAMIN 1000 MCG: 1000 INJECTION, SOLUTION INTRAMUSCULAR; SUBCUTANEOUS at 14:50

## 2020-08-07 LAB
ALBUMIN SERPL-MCNC: 4.7 G/DL (ref 3.5–5.2)
ALBUMIN/GLOB SERPL: 2.4 G/DL
ALP SERPL-CCNC: 87 U/L (ref 39–117)
ALT SERPL-CCNC: 33 U/L (ref 1–33)
APPEARANCE UR: CLEAR
AST SERPL-CCNC: 19 U/L (ref 1–32)
BACTERIA #/AREA URNS HPF: NORMAL /HPF
BILIRUB SERPL-MCNC: 0.3 MG/DL (ref 0–1.2)
BILIRUB UR QL STRIP: NEGATIVE
BUN SERPL-MCNC: 15 MG/DL (ref 6–20)
BUN/CREAT SERPL: 17.2 (ref 7–25)
CALCIUM SERPL-MCNC: 9.7 MG/DL (ref 8.6–10.5)
CHLORIDE SERPL-SCNC: 102 MMOL/L (ref 98–107)
CHOLEST SERPL-MCNC: 239 MG/DL (ref 0–200)
CHOLEST/HDLC SERPL: 4.12 {RATIO}
CO2 SERPL-SCNC: 27.4 MMOL/L (ref 22–29)
COLOR UR: YELLOW
CREAT SERPL-MCNC: 0.87 MG/DL (ref 0.57–1)
EPI CELLS #/AREA URNS HPF: NORMAL /HPF (ref 0–10)
ERYTHROCYTE [DISTWIDTH] IN BLOOD BY AUTOMATED COUNT: 12.5 % (ref 12.3–15.4)
FOLATE SERPL-MCNC: 6.19 NG/ML (ref 4.78–24.2)
GLOBULIN SER CALC-MCNC: 2 GM/DL
GLUCOSE SERPL-MCNC: 81 MG/DL (ref 65–99)
GLUCOSE UR QL: NEGATIVE
HCT VFR BLD AUTO: 39.6 % (ref 34–46.6)
HCV AB S/CO SERPL IA: 0.2 S/CO RATIO (ref 0–0.9)
HDLC SERPL-MCNC: 58 MG/DL (ref 40–60)
HGB BLD-MCNC: 12.8 G/DL (ref 12–15.9)
HGB UR QL STRIP: NEGATIVE
KETONES UR QL STRIP: NEGATIVE
LDLC SERPL CALC-MCNC: 123 MG/DL (ref 0–100)
LEUKOCYTE ESTERASE UR QL STRIP: NEGATIVE
MCH RBC QN AUTO: 27.4 PG (ref 26.6–33)
MCHC RBC AUTO-ENTMCNC: 32.3 G/DL (ref 31.5–35.7)
MCV RBC AUTO: 84.6 FL (ref 79–97)
MICRO URNS: NORMAL
MICRO URNS: NORMAL
NITRITE UR QL STRIP: NEGATIVE
PH UR STRIP: 6 [PH] (ref 5–7.5)
PLATELET # BLD AUTO: 327 10*3/MM3 (ref 140–450)
POTASSIUM SERPL-SCNC: 4.7 MMOL/L (ref 3.5–5.2)
PROT SERPL-MCNC: 6.7 G/DL (ref 6–8.5)
PROT UR QL STRIP: NEGATIVE
RBC # BLD AUTO: 4.68 10*6/MM3 (ref 3.77–5.28)
RBC #/AREA URNS HPF: NORMAL /HPF (ref 0–2)
SODIUM SERPL-SCNC: 141 MMOL/L (ref 136–145)
SP GR UR: 1.01 (ref 1–1.03)
TRIGL SERPL-MCNC: 291 MG/DL (ref 0–150)
TSH SERPL DL<=0.005 MIU/L-ACNC: 4.25 UIU/ML (ref 0.27–4.2)
URINALYSIS REFLEX: NORMAL
UROBILINOGEN UR STRIP-MCNC: 0.2 MG/DL (ref 0.2–1)
VIT B12 SERPL-MCNC: 336 PG/ML (ref 211–946)
VLDLC SERPL CALC-MCNC: 58.2 MG/DL
WBC # BLD AUTO: 7.07 10*3/MM3 (ref 3.4–10.8)
WBC #/AREA URNS HPF: NORMAL /HPF (ref 0–5)

## 2020-08-18 RX ORDER — ERGOCALCIFEROL 1.25 MG/1
CAPSULE ORAL
Qty: 12 CAPSULE | Refills: 0 | Status: SHIPPED | OUTPATIENT
Start: 2020-08-18 | End: 2021-04-15

## 2020-09-10 RX ORDER — OLMESARTAN MEDOXOMIL AND HYDROCHLOROTHIAZIDE 20/12.5 20; 12.5 MG/1; MG/1
1 TABLET ORAL DAILY
Qty: 90 TABLET | Refills: 1 | Status: SHIPPED | OUTPATIENT
Start: 2020-09-10 | End: 2021-04-26

## 2020-10-28 ENCOUNTER — OFFICE VISIT (OUTPATIENT)
Dept: INTERNAL MEDICINE | Facility: CLINIC | Age: 55
End: 2020-10-28

## 2020-10-28 VITALS
TEMPERATURE: 98.2 F | DIASTOLIC BLOOD PRESSURE: 80 MMHG | BODY MASS INDEX: 35.82 KG/M2 | SYSTOLIC BLOOD PRESSURE: 124 MMHG | HEART RATE: 82 BPM | OXYGEN SATURATION: 99 % | HEIGHT: 67 IN

## 2020-10-28 DIAGNOSIS — D22.9 CHANGE IN MOLE: Primary | ICD-10-CM

## 2020-10-28 DIAGNOSIS — L30.9 DERMATITIS: ICD-10-CM

## 2020-10-28 DIAGNOSIS — E28.2 POLYCYSTIC OVARIAN SYNDROME: ICD-10-CM

## 2020-10-28 PROCEDURE — 99214 OFFICE O/P EST MOD 30 MIN: CPT | Performed by: INTERNAL MEDICINE

## 2020-10-28 PROCEDURE — 96372 THER/PROPH/DIAG INJ SC/IM: CPT | Performed by: INTERNAL MEDICINE

## 2020-10-28 RX ORDER — METFORMIN HYDROCHLORIDE 500 MG/1
500 TABLET, EXTENDED RELEASE ORAL 2 TIMES DAILY
Qty: 180 TABLET | Refills: 1 | Status: SHIPPED | OUTPATIENT
Start: 2020-10-28 | End: 2021-07-13

## 2020-10-28 RX ADMIN — CYANOCOBALAMIN 1000 MCG: 1000 INJECTION, SOLUTION INTRAMUSCULAR; SUBCUTANEOUS at 09:37

## 2020-12-23 RX ORDER — LIOTHYRONINE SODIUM 5 UG/1
TABLET ORAL
Qty: 90 TABLET | Refills: 0 | Status: SHIPPED | OUTPATIENT
Start: 2020-12-23 | End: 2021-04-15

## 2021-02-03 RX ORDER — LEVOTHYROXINE SODIUM 0.12 MG/1
125 TABLET ORAL DAILY
Qty: 24 TABLET | Refills: 0 | Status: SHIPPED | OUTPATIENT
Start: 2021-02-03 | End: 2021-03-03 | Stop reason: DRUGHIGH

## 2021-02-03 RX ORDER — LEVOTHYROXINE SODIUM 0.12 MG/1
125 TABLET ORAL DAILY
Qty: 90 TABLET | Refills: 1 | OUTPATIENT
Start: 2021-02-03

## 2021-02-07 ENCOUNTER — PATIENT MESSAGE (OUTPATIENT)
Dept: INTERNAL MEDICINE | Facility: CLINIC | Age: 56
End: 2021-02-07

## 2021-02-08 NOTE — TELEPHONE ENCOUNTER
From: John Monk  To: Colette Lancaster MD  Sent: 2/7/2021 4:06 PM EST  Subject: Referral Request    Hi Dr. Lancaster,   I want a referral for colonoscopy. I also need to schedule a mammogram.  Thank you   John Monk

## 2021-02-26 ENCOUNTER — OFFICE VISIT (OUTPATIENT)
Dept: INTERNAL MEDICINE | Facility: CLINIC | Age: 56
End: 2021-02-26

## 2021-02-26 VITALS
WEIGHT: 242 LBS | TEMPERATURE: 97.6 F | DIASTOLIC BLOOD PRESSURE: 78 MMHG | BODY MASS INDEX: 37.98 KG/M2 | SYSTOLIC BLOOD PRESSURE: 100 MMHG | HEIGHT: 67 IN | HEART RATE: 109 BPM | OXYGEN SATURATION: 99 %

## 2021-02-26 DIAGNOSIS — E03.9 HYPOTHYROIDISM, ADULT: ICD-10-CM

## 2021-02-26 DIAGNOSIS — E74.39 GLUCOSE INTOLERANCE: ICD-10-CM

## 2021-02-26 DIAGNOSIS — E28.2 POLYCYSTIC OVARIAN SYNDROME: ICD-10-CM

## 2021-02-26 DIAGNOSIS — E66.01 MORBIDLY OBESE (HCC): ICD-10-CM

## 2021-02-26 DIAGNOSIS — Z00.00 PHYSICAL EXAM: Primary | ICD-10-CM

## 2021-02-26 DIAGNOSIS — Z12.11 SCREENING FOR COLON CANCER: ICD-10-CM

## 2021-02-26 DIAGNOSIS — Z12.31 ENCOUNTER FOR SCREENING MAMMOGRAM FOR MALIGNANT NEOPLASM OF BREAST: ICD-10-CM

## 2021-02-26 DIAGNOSIS — Z12.4 SCREENING FOR CERVICAL CANCER: ICD-10-CM

## 2021-02-26 DIAGNOSIS — I10 ESSENTIAL HYPERTENSION: ICD-10-CM

## 2021-02-26 LAB — HEMOCCULT STL QL IA: NEGATIVE

## 2021-02-26 PROCEDURE — 99396 PREV VISIT EST AGE 40-64: CPT | Performed by: NURSE PRACTITIONER

## 2021-02-26 PROCEDURE — 82274 ASSAY TEST FOR BLOOD FECAL: CPT | Performed by: NURSE PRACTITIONER

## 2021-02-26 PROCEDURE — 96372 THER/PROPH/DIAG INJ SC/IM: CPT | Performed by: NURSE PRACTITIONER

## 2021-02-26 PROCEDURE — 36415 COLL VENOUS BLD VENIPUNCTURE: CPT | Performed by: NURSE PRACTITIONER

## 2021-02-26 RX ADMIN — CYANOCOBALAMIN 1000 MCG: 1000 INJECTION, SOLUTION INTRAMUSCULAR; SUBCUTANEOUS at 16:37

## 2021-02-26 NOTE — PROGRESS NOTES
Luiz Monk is a 55 y.o. female who presents for a physical exam.    History of Present Illness     The following portions of the patient's history were reviewed and updated as appropriate: allergies, current medications, past social history and problem list.    Past Medical History:   Diagnosis Date   • Hyperlipidemia    • Hypothyroidism    • Nephrolithiasis    • Polycystic ovarian syndrome          Current Outpatient Medications:   •  buPROPion XL (WELLBUTRIN XL) 150 MG 24 hr tablet, TAKE 1 TABLET BY MOUTH DAILY, Disp: 90 tablet, Rfl: 1  •  buPROPion XL (WELLBUTRIN XL) 300 MG 24 hr tablet, TAKE 1 TABLET BY MOUTH DAILY, Disp: 90 tablet, Rfl: 1  •  cyanocobalamin 1000 MCG/ML injection, INJECT CONTENTS OF 1 VIAL EVERY WEEK X 4 WEEKS, THEN ONCE MONTHLY THEREAFTER, Disp: 12 mL, Rfl: 0  •  levothyroxine (SYNTHROID, LEVOTHROID) 125 MCG tablet, TAKE 1 TABLET BY MOUTH DAILY, Disp: 24 tablet, Rfl: 0  •  liothyronine (CYTOMEL) 5 MCG tablet, TAKE 1 TABLET BY MOUTH EVERY DAY, Disp: 90 tablet, Rfl: 0  •  metFORMIN ER (GLUCOPHAGE-XR) 500 MG 24 hr tablet, Take 1 tablet by mouth 2 (Two) Times a Day., Disp: 180 tablet, Rfl: 1  •  olmesartan-hydrochlorothiazide (BENICAR HCT) 20-12.5 MG per tablet, TAKE 1 TABLET BY MOUTH DAILY, Disp: 90 tablet, Rfl: 1  •  omeprazole (priLOSEC) 40 MG capsule, TAKE 1 CAPSULE BY MOUTH DAILY, Disp: 90 capsule, Rfl: 1  •  vitamin D (ERGOCALCIFEROL) 1.25 MG (73460 UT) capsule capsule, TAKE 1 CAPSULE BY MOUTH EVERY 7 DAYS, Disp: 12 capsule, Rfl: 0    Current Facility-Administered Medications:   •  cyanocobalamin injection 1,000 mcg, 1,000 mcg, Intramuscular, Q28 Days, Colette Lancaster MD, 1,000 mcg at 02/20/19 1548  •  cyanocobalamin injection 1,000 mcg, 1,000 mcg, Intramuscular, Q28 Days, Colette Lancaster MD, 1,000 mcg at 11/07/19 1442  •  cyanocobalamin injection 1,000 mcg, 1,000 mcg, Intramuscular, Q28 Days, Colette Lancaster MD, 1,000 mcg at 12/18/19 1015  •  cyanocobalamin injection 1,000 mcg,  1,000 mcg, Intramuscular, Q28 Days, Colette Lancaster MD, 1,000 mcg at 02/26/21 1637    No Known Allergies    Review of Systems   Constitutional: Negative for activity change, appetite change, chills, diaphoresis, fatigue, fever and unexpected weight change.   HENT: Negative for congestion, dental problem, drooling, ear discharge, ear pain, facial swelling, hearing loss, mouth sores, nosebleeds, postnasal drip, rhinorrhea, sinus pressure, sore throat, tinnitus and trouble swallowing.    Eyes: Negative for photophobia, pain, discharge, redness, itching and visual disturbance.   Respiratory: Negative for apnea, cough, choking, chest tightness, shortness of breath and wheezing.    Cardiovascular: Negative for chest pain, palpitations and leg swelling.        No orthopnea, PND, BARRIGA   Gastrointestinal: Negative for abdominal pain, blood in stool, constipation, diarrhea, nausea and vomiting.   Endocrine: Negative for cold intolerance, heat intolerance, polydipsia and polyuria.   Genitourinary: Negative for decreased urine volume, dysuria, enuresis, flank pain, frequency, hematuria and urgency.   Musculoskeletal: Negative for arthralgias, back pain, gait problem, joint swelling, myalgias, neck pain and neck stiffness.   Skin: Positive for rash (dry patches on upper chest). Negative for color change.        No hair changes, no nail changes   Allergic/Immunologic: Negative for environmental allergies, food allergies and immunocompromised state.   Neurological: Negative for dizziness, tremors, seizures, syncope, speech difficulty, weakness, light-headedness, numbness and headaches.   Hematological: Negative for adenopathy. Does not bruise/bleed easily.   Psychiatric/Behavioral: Negative for agitation, confusion, decreased concentration, dysphoric mood, sleep disturbance and suicidal ideas. The patient is not nervous/anxious.        Objective   Vitals:    02/26/21 1304   BP: 100/78   BP Location: Left arm   Patient Position:  "Sitting   Cuff Size: Adult   Pulse: 109   Temp: 97.6 °F (36.4 °C)   TempSrc: Oral   SpO2: 99%   Weight: 110 kg (242 lb)   Height: 169.5 cm (66.75\")     Body mass index is 38.19 kg/m².  Physical Exam  Vitals signs and nursing note reviewed.   Constitutional:       Appearance: She is well-developed.   HENT:      Right Ear: Hearing, tympanic membrane, ear canal and external ear normal.      Left Ear: Hearing, tympanic membrane, ear canal and external ear normal.      Nose:      Right Sinus: No maxillary sinus tenderness or frontal sinus tenderness.      Left Sinus: No maxillary sinus tenderness or frontal sinus tenderness.   Eyes:      General: Lids are normal.      Conjunctiva/sclera: Conjunctivae normal.      Pupils: Pupils are equal, round, and reactive to light.   Neck:      Musculoskeletal: Neck supple.      Thyroid: No thyroid mass or thyromegaly.      Vascular: No carotid bruit or JVD.      Trachea: Trachea normal. No tracheal deviation.   Cardiovascular:      Rate and Rhythm: Normal rate and regular rhythm.      Pulses:           Carotid pulses are 2+ on the right side and 2+ on the left side.       Radial pulses are 2+ on the right side and 2+ on the left side.        Dorsalis pedis pulses are 2+ on the right side and 2+ on the left side.        Posterior tibial pulses are 2+ on the right side and 2+ on the left side.      Heart sounds: S1 normal and S2 normal. No murmur. No friction rub. No gallop.    Pulmonary:      Effort: Pulmonary effort is normal.      Breath sounds: Normal breath sounds.   Chest:      Chest wall: There is no dullness to percussion.      Breasts:         Right: No inverted nipple, mass, nipple discharge, skin change or tenderness.         Left: No inverted nipple, mass, nipple discharge, skin change or tenderness.   Abdominal:      General: Bowel sounds are normal. There is no abdominal bruit.      Palpations: Abdomen is soft.      Tenderness: There is no abdominal tenderness. There is " no guarding or rebound.      Hernia: No hernia is present.   Genitourinary:     Labia:         Right: No rash, tenderness, lesion or injury.         Left: No rash, tenderness, lesion or injury.       Vagina: Normal.      Cervix: Normal.      Uterus: Normal.       Adnexa:         Right: No mass, tenderness or fullness.          Left: No mass, tenderness or fullness.        Rectum: Guaiac result negative. No mass or tenderness.   Musculoskeletal: Normal range of motion.   Lymphadenopathy:      Cervical: No cervical adenopathy.      Upper Body:      Right upper body: No supraclavicular adenopathy.      Left upper body: No supraclavicular adenopathy.   Skin:     General: Skin is warm and dry.   Neurological:      Mental Status: She is alert.      Cranial Nerves: No cranial nerve deficit.      Sensory: No sensory deficit.      Deep Tendon Reflexes:      Reflex Scores:       Patellar reflexes are 2+ on the right side and 2+ on the left side.        Assessment/Plan   Diagnoses and all orders for this visit:    1. Physical exam (Primary)  -     CBC (No Diff)  -     Comprehensive Metabolic Panel  -     Lipid Panel  -     TSH  -     Urinalysis With Microscopic If Indicated (No Culture) - Urine, Clean Catch    2. Polycystic ovarian syndrome    3. Essential hypertension    4. Hypothyroidism, adult    5. Glucose intolerance  -     Hemoglobin A1c    6. Encounter for screening mammogram for malignant neoplasm of breast  -     Mammo Screening Bilateral With CAD; Future    7. Morbidly obese (CMS/HCC)    8. Screening for cervical cancer  -     IgP, Aptima HPV    9. Screening for colon cancer  -     Ambulatory Referral to Gastroenterology  -     POC FECAL OCCULT BLOOD BY IMMUNOASSAY    Other orders  -     Microscopic Examination -        Risk assessment:  She has a family hx (father and mother) of CAD-check fasting labs today.    Her Body mass index is 38.19 kg/m². - She has a hx of PCOS and has stopped her Metformin which she will  restart. We discussed diet and exercise program with the goal of weight loss.    Prevention:  She has received her annual flu vaccine. Tdap is current.  Colonoscopy is due, referral placed.   Discussed Shingrix vaccine, she will check with pharmacy regarding coverage and availability.    Discussed healthy lifestyle choices such as maintaining a balanced diet low in carbohydrates and limiting caffeine and alcohol intake.  Recommended routine exercise for bone strength and cardiovascular health.

## 2021-02-27 LAB
ALBUMIN SERPL-MCNC: 4.5 G/DL (ref 3.8–4.9)
ALBUMIN/GLOB SERPL: 1.7 {RATIO} (ref 1.2–2.2)
ALP SERPL-CCNC: 90 IU/L (ref 39–117)
ALT SERPL-CCNC: 18 IU/L (ref 0–32)
APPEARANCE UR: ABNORMAL
AST SERPL-CCNC: 14 IU/L (ref 0–40)
BACTERIA #/AREA URNS HPF: ABNORMAL /[HPF]
BILIRUB SERPL-MCNC: 0.5 MG/DL (ref 0–1.2)
BILIRUB UR QL STRIP: NEGATIVE
BUN SERPL-MCNC: 15 MG/DL (ref 6–24)
BUN/CREAT SERPL: 12 (ref 9–23)
CALCIUM SERPL-MCNC: 10.1 MG/DL (ref 8.7–10.2)
CASTS URNS MICRO: ABNORMAL
CASTS URNS QL MICRO: PRESENT /LPF
CHLORIDE SERPL-SCNC: 101 MMOL/L (ref 96–106)
CHOLEST SERPL-MCNC: 203 MG/DL (ref 100–199)
CO2 SERPL-SCNC: 24 MMOL/L (ref 20–29)
COLOR UR: YELLOW
CREAT SERPL-MCNC: 1.25 MG/DL (ref 0.57–1)
CRYSTALS URNS MICRO: ABNORMAL
EPI CELLS #/AREA URNS HPF: ABNORMAL /HPF (ref 0–10)
ERYTHROCYTE [DISTWIDTH] IN BLOOD BY AUTOMATED COUNT: 12.7 % (ref 11.7–15.4)
GLOBULIN SER CALC-MCNC: 2.7 G/DL (ref 1.5–4.5)
GLUCOSE SERPL-MCNC: 75 MG/DL (ref 65–99)
GLUCOSE UR QL: NEGATIVE
HBA1C MFR BLD: 5.5 % (ref 4.8–5.6)
HCT VFR BLD AUTO: 36.7 % (ref 34–46.6)
HDLC SERPL-MCNC: 53 MG/DL
HGB BLD-MCNC: 12.3 G/DL (ref 11.1–15.9)
HGB UR QL STRIP: ABNORMAL
KETONES UR QL STRIP: ABNORMAL
LDLC SERPL CALC-MCNC: 120 MG/DL (ref 0–99)
LEUKOCYTE ESTERASE UR QL STRIP: ABNORMAL
MCH RBC QN AUTO: 27.7 PG (ref 26.6–33)
MCHC RBC AUTO-ENTMCNC: 33.5 G/DL (ref 31.5–35.7)
MCV RBC AUTO: 83 FL (ref 79–97)
MICRO URNS: ABNORMAL
MUCOUS THREADS URNS QL MICRO: PRESENT
NITRITE UR QL STRIP: NEGATIVE
PH UR STRIP: 6 [PH] (ref 5–7.5)
PLATELET # BLD AUTO: 329 X10E3/UL (ref 150–450)
POTASSIUM SERPL-SCNC: 4.4 MMOL/L (ref 3.5–5.2)
PROT SERPL-MCNC: 7.2 G/DL (ref 6–8.5)
PROT UR QL STRIP: ABNORMAL
RBC # BLD AUTO: 4.44 X10E6/UL (ref 3.77–5.28)
RBC #/AREA URNS HPF: ABNORMAL /HPF (ref 0–2)
SODIUM SERPL-SCNC: 140 MMOL/L (ref 134–144)
SP GR UR: 1.02 (ref 1–1.03)
TRIGL SERPL-MCNC: 171 MG/DL (ref 0–149)
TSH SERPL DL<=0.005 MIU/L-ACNC: 0.08 UIU/ML (ref 0.45–4.5)
UNIDENT CRYS URNS QL MICRO: PRESENT
UROBILINOGEN UR STRIP-MCNC: 1 MG/DL (ref 0.2–1)
VLDLC SERPL CALC-MCNC: 30 MG/DL (ref 5–40)
WBC # BLD AUTO: 7.2 X10E3/UL (ref 3.4–10.8)
WBC #/AREA URNS HPF: ABNORMAL /HPF (ref 0–5)

## 2021-02-28 PROBLEM — E66.01 MORBIDLY OBESE (HCC): Chronic | Status: ACTIVE | Noted: 2021-02-28

## 2021-02-28 PROBLEM — I10 ESSENTIAL HYPERTENSION: Chronic | Status: ACTIVE | Noted: 2018-02-07

## 2021-02-28 PROBLEM — E03.9 HYPOTHYROIDISM, ADULT: Chronic | Status: ACTIVE | Noted: 2017-05-15

## 2021-02-28 PROBLEM — F90.9 ATTENTION DEFICIT HYPERACTIVITY DISORDER (ADHD): Chronic | Status: ACTIVE | Noted: 2017-05-15

## 2021-02-28 PROBLEM — E53.8 VITAMIN B 12 DEFICIENCY: Chronic | Status: ACTIVE | Noted: 2017-05-15

## 2021-02-28 PROBLEM — E66.01 MORBIDLY OBESE: Status: ACTIVE | Noted: 2021-02-28

## 2021-02-28 PROBLEM — E55.9 VITAMIN D DEFICIENCY: Chronic | Status: ACTIVE | Noted: 2017-05-15

## 2021-03-02 DIAGNOSIS — E03.9 HYPOTHYROIDISM, ADULT: Primary | ICD-10-CM

## 2021-03-02 LAB
CYTOLOGIST CVX/VAG CYTO: NORMAL
CYTOLOGY CVX/VAG DOC CYTO: NORMAL
CYTOLOGY CVX/VAG DOC THIN PREP: NORMAL
DX ICD CODE: NORMAL
HIV 1 & 2 AB SER-IMP: NORMAL
HPV I/H RISK 4 DNA CVX QL PROBE+SIG AMP: NEGATIVE
OTHER STN SPEC: NORMAL
REF LAB TEST METHOD: NORMAL
STAT OF ADQ CVX/VAG CYTO-IMP: NORMAL

## 2021-03-03 LAB
Lab: NORMAL
REF LAB TEST METHOD: NORMAL
T4 FREE SERPL-MCNC: 1.79 NG/DL (ref 0.82–1.77)
WRITTEN AUTHORIZATION: NORMAL

## 2021-03-03 RX ORDER — LEVOTHYROXINE SODIUM 112 UG/1
112 TABLET ORAL DAILY
Qty: 90 TABLET | Refills: 2 | Status: SHIPPED | OUTPATIENT
Start: 2021-03-03 | End: 2021-07-14

## 2021-03-29 RX ORDER — OMEPRAZOLE 40 MG/1
40 CAPSULE, DELAYED RELEASE ORAL DAILY
Qty: 90 CAPSULE | Refills: 1 | Status: SHIPPED | OUTPATIENT
Start: 2021-03-29 | End: 2021-12-13

## 2021-03-29 RX ORDER — CYANOCOBALAMIN 1000 UG/ML
1000 INJECTION, SOLUTION INTRAMUSCULAR; SUBCUTANEOUS
Qty: 3 ML | Refills: 1 | Status: SHIPPED | OUTPATIENT
Start: 2021-03-29 | End: 2021-07-13 | Stop reason: SDUPTHER

## 2021-04-08 ENCOUNTER — HOSPITAL ENCOUNTER (OUTPATIENT)
Dept: MAMMOGRAPHY | Facility: HOSPITAL | Age: 56
Discharge: HOME | End: 2021-04-08
Admitting: NURSE PRACTITIONER

## 2021-04-08 ENCOUNTER — APPOINTMENT (OUTPATIENT)
Dept: MAMMOGRAPHY | Facility: HOSPITAL | Age: 56
End: 2021-04-08

## 2021-04-08 DIAGNOSIS — Z12.31 ENCOUNTER FOR SCREENING MAMMOGRAM FOR MALIGNANT NEOPLASM OF BREAST: ICD-10-CM

## 2021-04-08 PROCEDURE — 77063 BREAST TOMOSYNTHESIS BI: CPT

## 2021-04-08 PROCEDURE — 77067 SCR MAMMO BI INCL CAD: CPT

## 2021-04-15 RX ORDER — ERGOCALCIFEROL 1.25 MG/1
CAPSULE ORAL
Qty: 12 CAPSULE | Refills: 0 | Status: SHIPPED | OUTPATIENT
Start: 2021-04-15 | End: 2021-09-02 | Stop reason: SDUPTHER

## 2021-04-15 RX ORDER — BUPROPION HYDROCHLORIDE 300 MG/1
300 TABLET ORAL DAILY
Qty: 90 TABLET | Refills: 1 | Status: SHIPPED | OUTPATIENT
Start: 2021-04-15 | End: 2021-12-10

## 2021-04-15 RX ORDER — BUPROPION HYDROCHLORIDE 150 MG/1
150 TABLET ORAL DAILY
Qty: 90 TABLET | Refills: 1 | Status: SHIPPED | OUTPATIENT
Start: 2021-04-15 | End: 2021-09-01 | Stop reason: SDUPTHER

## 2021-04-15 RX ORDER — LIOTHYRONINE SODIUM 5 UG/1
TABLET ORAL
Qty: 90 TABLET | Refills: 0 | Status: SHIPPED | OUTPATIENT
Start: 2021-04-15 | End: 2021-07-13

## 2021-04-26 RX ORDER — OLMESARTAN MEDOXOMIL AND HYDROCHLOROTHIAZIDE 20/12.5 20; 12.5 MG/1; MG/1
1 TABLET ORAL DAILY
Qty: 90 TABLET | Refills: 1 | Status: SHIPPED | OUTPATIENT
Start: 2021-04-26 | End: 2021-12-10

## 2021-07-13 ENCOUNTER — OFFICE VISIT (OUTPATIENT)
Dept: INTERNAL MEDICINE | Facility: CLINIC | Age: 56
End: 2021-07-13

## 2021-07-13 VITALS
HEIGHT: 67 IN | SYSTOLIC BLOOD PRESSURE: 108 MMHG | TEMPERATURE: 99.1 F | DIASTOLIC BLOOD PRESSURE: 62 MMHG | BODY MASS INDEX: 39.71 KG/M2 | HEART RATE: 79 BPM | WEIGHT: 253 LBS | OXYGEN SATURATION: 99 %

## 2021-07-13 DIAGNOSIS — I10 ESSENTIAL HYPERTENSION: Chronic | ICD-10-CM

## 2021-07-13 DIAGNOSIS — E03.9 HYPOTHYROIDISM, ADULT: Primary | Chronic | ICD-10-CM

## 2021-07-13 DIAGNOSIS — E53.8 B12 DEFICIENCY: ICD-10-CM

## 2021-07-13 DIAGNOSIS — E66.01 MORBIDLY OBESE (HCC): Chronic | ICD-10-CM

## 2021-07-13 DIAGNOSIS — E03.9 HYPOTHYROIDISM, ADULT: Primary | ICD-10-CM

## 2021-07-13 PROCEDURE — 99214 OFFICE O/P EST MOD 30 MIN: CPT | Performed by: INTERNAL MEDICINE

## 2021-07-13 PROCEDURE — 96372 THER/PROPH/DIAG INJ SC/IM: CPT | Performed by: INTERNAL MEDICINE

## 2021-07-13 RX ORDER — LIOTHYRONINE SODIUM 5 UG/1
TABLET ORAL
Qty: 90 TABLET | Refills: 0 | Status: SHIPPED | OUTPATIENT
Start: 2021-07-13 | End: 2022-03-03 | Stop reason: SDUPTHER

## 2021-07-13 RX ORDER — PEN NEEDLE, DIABETIC 31 GX5/16"
1 NEEDLE, DISPOSABLE MISCELLANEOUS WEEKLY
Qty: 4 EACH | Refills: 5 | Status: SHIPPED | OUTPATIENT
Start: 2021-07-13 | End: 2022-03-03

## 2021-07-13 RX ORDER — CYANOCOBALAMIN 1000 UG/ML
1000 INJECTION, SOLUTION INTRAMUSCULAR; SUBCUTANEOUS
Status: DISCONTINUED | OUTPATIENT
Start: 2021-07-13 | End: 2022-03-03

## 2021-07-13 RX ORDER — SEMAGLUTIDE 1.34 MG/ML
0.25 INJECTION, SOLUTION SUBCUTANEOUS WEEKLY
Qty: 1 PEN | Refills: 5 | Status: SHIPPED | OUTPATIENT
Start: 2021-07-13 | End: 2021-09-01

## 2021-07-13 RX ADMIN — CYANOCOBALAMIN 1000 MCG: 1000 INJECTION, SOLUTION INTRAMUSCULAR; SUBCUTANEOUS at 16:21

## 2021-07-13 NOTE — PROGRESS NOTES
Chief Complaint   Patient presents with   • Follow-up       Subjective   John Monk is a 55 y.o. female.     History of Present Illness     Obesity: Since 2016, she has gained 50 pounds.  She is trying Noom to help with weight loss.  Being obese contributes to knee pain. She has had to have injections for this.  She is interested in trying Ozempic for potential appetite suppression and weight loss.       Hypothyroidism:  This is a chronic problem.  Current treatment: Levothyroxine.  By report, there is good compliance with treatment, good tolerance of treatment and good symptom control.   Lab Results   Component Value Date    TSH 0.084 (L) 02/26/2021   With low TSH, levothyroxine was reduced to 112 mcg.    Hypertension: This is a chronic problem currently treated with an ARB and diuretic.  Prudent diet and regular exercise have also been recommended.            The following portions of the patient's history were reviewed and updated as appropriate: allergies, current medications, past family history, past medical history, past social history, past surgical history and problem list.      Current Outpatient Medications on File Prior to Visit   Medication Sig Dispense Refill   • buPROPion XL (WELLBUTRIN XL) 150 MG 24 hr tablet TAKE 1 TABLET BY MOUTH DAILY 90 tablet 1   • buPROPion XL (WELLBUTRIN XL) 300 MG 24 hr tablet TAKE 1 TABLET BY MOUTH DAILY 90 tablet 1   • levothyroxine (Synthroid) 112 MCG tablet Take 1 tablet by mouth Daily. 90 tablet 2   • olmesartan-hydrochlorothiazide (BENICAR HCT) 20-12.5 MG per tablet TAKE 1 TABLET BY MOUTH DAILY 90 tablet 1   • omeprazole (priLOSEC) 40 MG capsule TAKE 1 CAPSULE BY MOUTH DAILY 90 capsule 1   • vitamin D (ERGOCALCIFEROL) 1.25 MG (57292 UT) capsule capsule TAKE ONE CAPSULE BY MOUTH EVERY 7 DAYS 12 capsule 0   • cyanocobalamin 1000 MCG/ML injection Inject 1 mL into the appropriate muscle as directed by prescriber Every 30 (Thirty) Days. 3 mL 1   • [DISCONTINUED]  "liothyronine (CYTOMEL) 5 MCG tablet TAKE 1 TABLET BY MOUTH EVERY DAY 90 tablet 0   • [DISCONTINUED] metFORMIN ER (GLUCOPHAGE-XR) 500 MG 24 hr tablet Take 1 tablet by mouth 2 (Two) Times a Day. 180 tablet 1     Current Facility-Administered Medications on File Prior to Visit   Medication Dose Route Frequency Provider Last Rate Last Admin   • [DISCONTINUED] cyanocobalamin injection 1,000 mcg  1,000 mcg Intramuscular Q28 Days Colette Lancaster MD   1,000 mcg at 02/20/19 1548   • [DISCONTINUED] cyanocobalamin injection 1,000 mcg  1,000 mcg Intramuscular Q28 Days Colette Lancaster MD   1,000 mcg at 11/07/19 1442   • [DISCONTINUED] cyanocobalamin injection 1,000 mcg  1,000 mcg Intramuscular Q28 Days Colette Lancaster MD   1,000 mcg at 12/18/19 1015   • [DISCONTINUED] cyanocobalamin injection 1,000 mcg  1,000 mcg Intramuscular Q28 Days Colette Lancaster MD   1,000 mcg at 02/26/21 1637               Review of Systems   Constitutional: Negative for appetite change.   HENT: Negative for nosebleeds.    Eyes: Negative for blurred vision and double vision.   Respiratory: Negative for cough and shortness of breath.    Cardiovascular: Negative for chest pain, palpitations and leg swelling.           Objective     /62   Pulse 79   Temp 99.1 °F (37.3 °C)   Ht 169.5 cm (66.75\")   Wt 115 kg (253 lb)   SpO2 99%   BMI 39.92 kg/m²       Physical Exam  Vitals and nursing note reviewed.   Constitutional:       General: She is not in acute distress.     Appearance: She is well-developed. She is obese.   Neck:      Vascular: Normal carotid pulses. No carotid bruit.   Cardiovascular:      Rate and Rhythm: Regular rhythm.      Pulses:           Carotid pulses are 2+ on the right side and 2+ on the left side.     Heart sounds: S1 normal and S2 normal. No murmur heard.   No friction rub. No gallop.    Pulmonary:      Effort: Pulmonary effort is normal.      Breath sounds: Normal breath sounds. No wheezing, rhonchi or rales.   Musculoskeletal:    " "  Left knee: Swelling present.      Right lower leg: No edema.      Left lower leg: No edema.   Skin:     General: Skin is warm and dry.   Neurological:      Mental Status: She is alert and oriented to person, place, and time.           Assessment/Plan   Diagnoses and all orders for this visit:    1. Hypothyroidism, adult (Primary)  -     T4, Free  -     T3, Free  -     TSH    2. Essential hypertension  -     Comprehensive Metabolic Panel  -     Lipid Panel With / Chol / HDL Ratio    3. Morbidly obese (CMS/HCC)  -     Hemoglobin A1c    Other orders  -     Semaglutide, 1 MG/DOSE, (Ozempic, 1 MG/DOSE,) 2 MG/1.5ML solution pen-injector; Inject 0.25 mg under the skin into the appropriate area as directed 1 (One) Time Per Week.  Dispense: 1 pen; Refill: 5  -     Insulin Pen Needle (Pen Needles 5/16\") 31G X 8 MM misc; 1 each 1 (One) Time Per Week.  Dispense: 4 each; Refill: 5               "

## 2021-07-14 LAB
ALBUMIN SERPL-MCNC: 4.6 G/DL (ref 3.8–4.9)
ALBUMIN/GLOB SERPL: 1.8 {RATIO} (ref 1.2–2.2)
ALP SERPL-CCNC: 107 IU/L (ref 48–121)
ALT SERPL-CCNC: 23 IU/L (ref 0–32)
AST SERPL-CCNC: 17 IU/L (ref 0–40)
BILIRUB SERPL-MCNC: 0.4 MG/DL (ref 0–1.2)
BUN SERPL-MCNC: 14 MG/DL (ref 6–24)
BUN/CREAT SERPL: 14 (ref 9–23)
CALCIUM SERPL-MCNC: 10 MG/DL (ref 8.7–10.2)
CHLORIDE SERPL-SCNC: 99 MMOL/L (ref 96–106)
CHOLEST SERPL-MCNC: 296 MG/DL (ref 100–199)
CHOLEST/HDLC SERPL: 4.6 RATIO (ref 0–4.4)
CO2 SERPL-SCNC: 24 MMOL/L (ref 20–29)
CREAT SERPL-MCNC: 0.98 MG/DL (ref 0.57–1)
GLOBULIN SER CALC-MCNC: 2.5 G/DL (ref 1.5–4.5)
GLUCOSE SERPL-MCNC: 88 MG/DL (ref 65–99)
HBA1C MFR BLD: 5.6 % (ref 4.8–5.6)
HDLC SERPL-MCNC: 65 MG/DL
LDLC SERPL CALC-MCNC: 184 MG/DL (ref 0–99)
POTASSIUM SERPL-SCNC: 4.7 MMOL/L (ref 3.5–5.2)
PROT SERPL-MCNC: 7.1 G/DL (ref 6–8.5)
SODIUM SERPL-SCNC: 137 MMOL/L (ref 134–144)
T3FREE SERPL-MCNC: 2.2 PG/ML (ref 2–4.4)
T4 FREE SERPL-MCNC: 0.54 NG/DL (ref 0.82–1.77)
TRIGL SERPL-MCNC: 250 MG/DL (ref 0–149)
TSH SERPL DL<=0.005 MIU/L-ACNC: 49.5 UIU/ML (ref 0.45–4.5)
VLDLC SERPL CALC-MCNC: 47 MG/DL (ref 5–40)

## 2021-07-14 RX ORDER — LEVOTHYROXINE SODIUM 150 UG/1
150 CAPSULE ORAL DAILY
Qty: 30 CAPSULE | Refills: 3 | Status: SHIPPED | OUTPATIENT
Start: 2021-07-14 | End: 2022-01-17

## 2021-08-31 RX ORDER — ERGOCALCIFEROL 1.25 MG/1
CAPSULE ORAL
Qty: 12 CAPSULE | Refills: 0 | OUTPATIENT
Start: 2021-08-31

## 2021-08-31 NOTE — TELEPHONE ENCOUNTER
I would like to hold Vitamin D for now. She has an appointment with me tomorrow and I will re-check her vitamin d level

## 2021-09-01 ENCOUNTER — OFFICE VISIT (OUTPATIENT)
Dept: INTERNAL MEDICINE | Facility: CLINIC | Age: 56
End: 2021-09-01

## 2021-09-01 VITALS
HEART RATE: 82 BPM | SYSTOLIC BLOOD PRESSURE: 118 MMHG | TEMPERATURE: 98 F | DIASTOLIC BLOOD PRESSURE: 72 MMHG | OXYGEN SATURATION: 97 % | WEIGHT: 240.3 LBS | BODY MASS INDEX: 37.72 KG/M2 | HEIGHT: 67 IN

## 2021-09-01 DIAGNOSIS — E03.9 HYPOTHYROIDISM, ADULT: Primary | Chronic | ICD-10-CM

## 2021-09-01 DIAGNOSIS — E53.8 VITAMIN B 12 DEFICIENCY: Chronic | ICD-10-CM

## 2021-09-01 DIAGNOSIS — I10 ESSENTIAL HYPERTENSION: Chronic | ICD-10-CM

## 2021-09-01 DIAGNOSIS — E55.9 VITAMIN D DEFICIENCY: Chronic | ICD-10-CM

## 2021-09-01 DIAGNOSIS — Z12.11 SCREENING FOR COLON CANCER: ICD-10-CM

## 2021-09-01 PROBLEM — M25.569 KNEE PAIN: Status: ACTIVE | Noted: 2019-05-01

## 2021-09-01 PROBLEM — S90.859A FOREIGN BODY IN FOOT: Status: ACTIVE | Noted: 2019-07-16

## 2021-09-01 PROCEDURE — 96372 THER/PROPH/DIAG INJ SC/IM: CPT | Performed by: INTERNAL MEDICINE

## 2021-09-01 PROCEDURE — 99214 OFFICE O/P EST MOD 30 MIN: CPT | Performed by: INTERNAL MEDICINE

## 2021-09-01 RX ORDER — SEMAGLUTIDE 1.34 MG/ML
1 INJECTION, SOLUTION SUBCUTANEOUS WEEKLY
Qty: 1 PEN | Refills: 5
Start: 2021-09-01 | End: 2022-03-03

## 2021-09-01 RX ORDER — CYANOCOBALAMIN 1000 UG/ML
INJECTION, SOLUTION INTRAMUSCULAR; SUBCUTANEOUS
COMMUNITY
Start: 2021-08-31 | End: 2021-09-01 | Stop reason: SDUPTHER

## 2021-09-01 RX ADMIN — CYANOCOBALAMIN 1000 MCG: 1000 INJECTION, SOLUTION INTRAMUSCULAR; SUBCUTANEOUS at 09:30

## 2021-09-01 NOTE — PROGRESS NOTES
"Chief Complaint   Patient presents with   • Hypertension   • Hypothyroidism       Subjective   John Monk is a 56 y.o. female.     History of Present Illness     Hypertension: This is a chronic problem currently treated with olmesartan-hct.  Prudent diet and regular exercise have also been recommended.  By report, there is good compliance with treatment and good tolerance of treatment.       Hypothyroidism:  This is a chronic problem.  Current treatment: Levothyroxine and cytomel. By report, there is good compliance with treatment, good tolerance of treatment and good symptom control.   Lab Results   Component Value Date    TSH 49.500 (H) 07/13/2021    Levothyroxine was increased to 150 mcg at her last appointment.      She is using ozempic to help with weight loss.  She has been able to loose weight.          The following portions of the patient's history were reviewed and updated as appropriate: allergies, current medications, past family history, past medical history, past social history, past surgical history and problem list.      Current Outpatient Medications:   •  buPROPion XL (WELLBUTRIN XL) 300 MG 24 hr tablet, TAKE 1 TABLET BY MOUTH DAILY, Disp: 90 tablet, Rfl: 1  •  Insulin Pen Needle (Pen Needles 5/16\") 31G X 8 MM misc, 1 each 1 (One) Time Per Week., Disp: 4 each, Rfl: 5  •  levothyroxine sodium (TIROSINT) 150 MCG capsule, Take 1 capsule by mouth Daily., Disp: 30 capsule, Rfl: 3  •  liothyronine (CYTOMEL) 5 MCG tablet, TAKE 1 TABLET BY MOUTH EVERY DAY, Disp: 90 tablet, Rfl: 0  •  olmesartan-hydrochlorothiazide (BENICAR HCT) 20-12.5 MG per tablet, TAKE 1 TABLET BY MOUTH DAILY, Disp: 90 tablet, Rfl: 1  •  omeprazole (priLOSEC) 40 MG capsule, TAKE 1 CAPSULE BY MOUTH DAILY, Disp: 90 capsule, Rfl: 1  •  Semaglutide, 1 MG/DOSE, (Ozempic, 1 MG/DOSE,) 2 MG/1.5ML solution pen-injector, Inject 1 mg under the skin into the appropriate area as directed 1 (One) Time Per Week., Disp: 1 pen, Rfl: 5  •  vitamin D " "(ERGOCALCIFEROL) 1.25 MG (11415 UT) capsule capsule, TAKE ONE CAPSULE BY MOUTH EVERY 7 DAYS, Disp: 12 capsule, Rfl: 0    Current Facility-Administered Medications:   •  cyanocobalamin injection 1,000 mcg, 1,000 mcg, Intramuscular, Q28 Days, Colette Lancaster MD, 1,000 mcg at 09/01/21 0930           Review of Systems   Constitutional: Negative for appetite change.   HENT: Negative for nosebleeds.    Eyes: Negative for blurred vision and double vision.   Respiratory: Negative for cough and shortness of breath.    Cardiovascular: Negative for chest pain, palpitations and leg swelling.           Objective     /72 (BP Location: Left arm, Patient Position: Sitting, Cuff Size: Adult)   Pulse 82   Temp 98 °F (36.7 °C)   Ht 169.5 cm (66.75\")   Wt 109 kg (240 lb 4.8 oz)   SpO2 97%   BMI 37.92 kg/m²       Physical Exam  Vitals and nursing note reviewed.   Constitutional:       General: She is not in acute distress.     Appearance: She is well-developed.   Neck:      Vascular: Normal carotid pulses. No carotid bruit.   Cardiovascular:      Rate and Rhythm: Regular rhythm.      Pulses:           Carotid pulses are 2+ on the right side and 2+ on the left side.     Heart sounds: S1 normal and S2 normal. No murmur heard.   No friction rub. No gallop.    Pulmonary:      Effort: Pulmonary effort is normal.      Breath sounds: Normal breath sounds. No wheezing, rhonchi or rales.   Musculoskeletal:      Right lower leg: No edema.      Left lower leg: No edema.   Skin:     General: Skin is warm and dry.   Neurological:      Mental Status: She is alert and oriented to person, place, and time.           Assessment/Plan   Diagnoses and all orders for this visit:    1. Hypothyroidism, adult (Primary)  -     T4, Free  -     T3, Free  -     TSH    2. Vitamin D deficiency  -     Vitamin D 25 Hydroxy    3. Essential hypertension  -     Comprehensive Metabolic Panel  -     Lipid Panel With / Chol / HDL Ratio    4. Vitamin B 12 " deficiency    5. Screening for colon cancer  -     Ambulatory Referral For Screening Colonoscopy    Other orders  -     Semaglutide, 1 MG/DOSE, (Ozempic, 1 MG/DOSE,) 2 MG/1.5ML solution pen-injector; Inject 1 mg under the skin into the appropriate area as directed 1 (One) Time Per Week.  Dispense: 1 pen; Refill: 5                   Return in about 6 months (around 3/1/2022) for Annual physical, Fasting.

## 2021-09-02 ENCOUNTER — TELEPHONE (OUTPATIENT)
Dept: GASTROENTEROLOGY | Facility: CLINIC | Age: 56
End: 2021-09-02

## 2021-09-02 LAB
25(OH)D3+25(OH)D2 SERPL-MCNC: 24.2 NG/ML (ref 30–100)
ALBUMIN SERPL-MCNC: 4.5 G/DL (ref 3.5–5.2)
ALBUMIN/GLOB SERPL: 2.1 G/DL
ALP SERPL-CCNC: 88 U/L (ref 39–117)
ALT SERPL-CCNC: 23 U/L (ref 1–33)
AST SERPL-CCNC: 15 U/L (ref 1–32)
BILIRUB SERPL-MCNC: 0.3 MG/DL (ref 0–1.2)
BUN SERPL-MCNC: 23 MG/DL (ref 6–20)
BUN/CREAT SERPL: 22.5 (ref 7–25)
CALCIUM SERPL-MCNC: 10 MG/DL (ref 8.6–10.5)
CHLORIDE SERPL-SCNC: 102 MMOL/L (ref 98–107)
CHOLEST SERPL-MCNC: 191 MG/DL (ref 0–200)
CHOLEST/HDLC SERPL: 3.75 {RATIO}
CO2 SERPL-SCNC: 24.7 MMOL/L (ref 22–29)
CREAT SERPL-MCNC: 1.02 MG/DL (ref 0.57–1)
GLOBULIN SER CALC-MCNC: 2.1 GM/DL
GLUCOSE SERPL-MCNC: 88 MG/DL (ref 65–99)
HDLC SERPL-MCNC: 51 MG/DL (ref 40–60)
LDLC SERPL CALC-MCNC: 110 MG/DL (ref 0–100)
POTASSIUM SERPL-SCNC: 4.8 MMOL/L (ref 3.5–5.2)
PROT SERPL-MCNC: 6.6 G/DL (ref 6–8.5)
SODIUM SERPL-SCNC: 138 MMOL/L (ref 136–145)
T3FREE SERPL-MCNC: 3.9 PG/ML (ref 2–4.4)
T4 FREE SERPL-MCNC: 1.68 NG/DL (ref 0.93–1.7)
TRIGL SERPL-MCNC: 175 MG/DL (ref 0–150)
TSH SERPL DL<=0.005 MIU/L-ACNC: 0.18 UIU/ML (ref 0.27–4.2)
VLDLC SERPL CALC-MCNC: 30 MG/DL (ref 5–40)

## 2021-09-02 RX ORDER — ERGOCALCIFEROL 1.25 MG/1
50000 CAPSULE ORAL
Qty: 12 CAPSULE | Refills: 0 | Status: SHIPPED | OUTPATIENT
Start: 2021-09-02 | End: 2022-03-03 | Stop reason: SDUPTHER

## 2021-09-15 ENCOUNTER — TELEPHONE (OUTPATIENT)
Dept: INTERNAL MEDICINE | Facility: CLINIC | Age: 56
End: 2021-09-15

## 2021-09-15 DIAGNOSIS — IMO0002 ABNORMAL HUMAN CHORIONIC GONADOTROPIN (HCG): Primary | ICD-10-CM

## 2021-09-15 NOTE — TELEPHONE ENCOUNTER
Caller: John Monk    Relationship to patient: Self    Best call back number: [phone]    Patient is needing: PATIENT IS CALLING IN REGARDS TO THE [facility] MESSAGE THAT SHE SENT BACK LAST WEEK. SHE STATED THAT SHE HASNT SPOKEN TO ANYONE IN REGARDS TO THE LABS THAT NEED TO BE ORDERED DUE TO A ER VISIT AND NEEDING TO BE CONTACTED ASAP IN REGARDS TO THEM

## 2021-09-16 ENCOUNTER — LAB (OUTPATIENT)
Dept: INTERNAL MEDICINE | Facility: CLINIC | Age: 56
End: 2021-09-16

## 2021-09-16 DIAGNOSIS — IMO0002 ABNORMAL HUMAN CHORIONIC GONADOTROPIN (HCG): ICD-10-CM

## 2021-09-17 LAB — B-HCG SERPL QL: NEGATIVE

## 2021-09-21 ENCOUNTER — TELEPHONE (OUTPATIENT)
Dept: GASTROENTEROLOGY | Facility: CLINIC | Age: 56
End: 2021-09-21

## 2021-09-21 ENCOUNTER — PREP FOR SURGERY (OUTPATIENT)
Dept: OTHER | Facility: HOSPITAL | Age: 56
End: 2021-09-21

## 2021-09-21 DIAGNOSIS — Z12.11 ENCOUNTER FOR SCREENING FOR MALIGNANT NEOPLASM OF COLON: Primary | ICD-10-CM

## 2021-09-21 NOTE — TELEPHONE ENCOUNTER
"Screening colonoscopy-- no personal or family hx of polyps or colon ca-- no ASA or blood thinners-- medications:    buPROPion XL (WELLBUTRIN XL) 300 MG 24 hr tablet  Insulin Pen Needle (Pen Needles 5/16\") 31G X 8 MM misc  levothyroxine sodium (TIROSINT) 150 MCG capsule  liothyronine (CYTOMEL) 5 MCG tablet  olmesartan-hydrochlorothiazide (BENICAR HCT) 20-12.5 MG per tablet  omeprazole (priLOSEC) 40 MG capsule  Semaglutide, 1 MG/DOSE, (Ozempic, 1 MG/DOSE,) 2 MG/1.5ML solution pen-injector  vitamin D (ERGOCALCIFEROL) 1.25 MG (61469 UT) capsule capsule  Vitamin B 12    OA form scanned into media    "

## 2021-10-01 ENCOUNTER — OFFICE VISIT (OUTPATIENT)
Dept: INTERNAL MEDICINE | Facility: CLINIC | Age: 56
End: 2021-10-01

## 2021-10-01 VITALS
HEART RATE: 88 BPM | BODY MASS INDEX: 37.92 KG/M2 | TEMPERATURE: 98.6 F | OXYGEN SATURATION: 99 % | HEIGHT: 67 IN | SYSTOLIC BLOOD PRESSURE: 164 MMHG | DIASTOLIC BLOOD PRESSURE: 100 MMHG

## 2021-10-01 DIAGNOSIS — R10.84 GENERALIZED ABDOMINAL PAIN: Primary | ICD-10-CM

## 2021-10-01 LAB
ANION GAP SERPL CALCULATED.3IONS-SCNC: 13.6 MMOL/L (ref 5–15)
BUN SERPL-MCNC: 10 MG/DL (ref 6–20)
BUN/CREAT SERPL: 10.3 (ref 7–25)
CALCIUM SPEC-SCNC: 9.6 MG/DL (ref 8.6–10.5)
CHLORIDE SERPL-SCNC: 106 MMOL/L (ref 98–107)
CO2 SERPL-SCNC: 20.4 MMOL/L (ref 22–29)
CREAT SERPL-MCNC: 0.97 MG/DL (ref 0.57–1)
DEPRECATED RDW RBC AUTO: 36.7 FL (ref 37–54)
ERYTHROCYTE [DISTWIDTH] IN BLOOD BY AUTOMATED COUNT: 12.3 % (ref 12.3–15.4)
GFR SERPL CREATININE-BSD FRML MDRD: 60 ML/MIN/1.73
GLUCOSE SERPL-MCNC: 86 MG/DL (ref 65–99)
HCT VFR BLD AUTO: 39.7 % (ref 34–46.6)
HGB BLD-MCNC: 13.4 G/DL (ref 12–15.9)
LIPASE SERPL-CCNC: 20 U/L (ref 13–60)
MCH RBC QN AUTO: 28.1 PG (ref 26.6–33)
MCHC RBC AUTO-ENTMCNC: 33.8 G/DL (ref 31.5–35.7)
MCV RBC AUTO: 83.2 FL (ref 79–97)
PLATELET # BLD AUTO: 308 10*3/MM3 (ref 140–450)
PMV BLD AUTO: 11.2 FL (ref 6–12)
POTASSIUM SERPL-SCNC: 4.2 MMOL/L (ref 3.5–5.2)
RBC # BLD AUTO: 4.77 10*6/MM3 (ref 3.77–5.28)
SODIUM SERPL-SCNC: 140 MMOL/L (ref 136–145)
WBC # BLD AUTO: 7.94 10*3/MM3 (ref 3.4–10.8)

## 2021-10-01 PROCEDURE — 83690 ASSAY OF LIPASE: CPT | Performed by: FAMILY MEDICINE

## 2021-10-01 PROCEDURE — 99213 OFFICE O/P EST LOW 20 MIN: CPT | Performed by: FAMILY MEDICINE

## 2021-10-01 PROCEDURE — 85027 COMPLETE CBC AUTOMATED: CPT | Performed by: FAMILY MEDICINE

## 2021-10-01 PROCEDURE — 80048 BASIC METABOLIC PNL TOTAL CA: CPT | Performed by: FAMILY MEDICINE

## 2021-10-01 NOTE — ASSESSMENT & PLAN NOTE
Labs today lipase, cbc, and bmp  Rule out pancreatitis  Advise plenty of water  Follow up as scheduled  Go to ER if worsening of symptoms

## 2021-10-01 NOTE — PROGRESS NOTES
"Chief Complaint  Abdominal Pain and Diarrhea    Subjective          John Monk presents to [facility]  History of Present Illness  55 yo female present for acute visit. Pt state she is having diffuse abdominal pain and diarrhea for the last 4 days.  She denies vomiting, some nausea.  7-8 episode of diarrhea a day. She took increase dose of ozempic 1 mg on Monday.  She tolerated the previous dose well, however issues started after the increase.  She is taking the medication to help with weight loss.  No history of diabetes. She has taken imodium and dicyclomine to help with symptoms yesterday. Has had 2 episode of diarrhea so far this morning.    She states abdomen is soft. She is concern for pancreatitis as she read this can be a side effect of the medication.        Objective   Vital Signs:   /100 (BP Location: Left arm, Patient Position: Sitting, Cuff Size: Adult)   Pulse 88   Temp 98.6 °F (37 °C) (Temporal)   Ht 169.5 cm (66.75\")   SpO2 99%   BMI 37.92 kg/m²       Physical Exam  Vitals reviewed.   HENT:      Head: Normocephalic.   Eyes:      Conjunctiva/sclera: Conjunctivae normal.   Cardiovascular:      Rate and Rhythm: Regular rhythm.      Heart sounds: Normal heart sounds.   Pulmonary:      Breath sounds: Normal breath sounds.   Abdominal:      General: Bowel sounds are normal. There is no distension.      Palpations: Abdomen is soft.      Tenderness: There is abdominal tenderness. There is no guarding or rebound.      Comments: ttp R upper and lower quadrant diffusely   Neurological:      Mental Status: She is alert.   Psychiatric:         Mood and Affect: Mood normal.        Result Review :   The following data was reviewed by: Abby Squires MD on 10/01/2021:  Common labs    Common Labsle 7/13/21 7/13/21 7/13/21 9/1/21 9/1/21 9/4/21    1228 1228 1228 0937 0937    Glucose 88   88     BUN 14   23 (A)     Creatinine 0.98   1.02 (A)     eGFR Non African Am 66   56 " (A)     eGFR African Am 76   68     Sodium 137   138     Potassium 4.7   4.8     Chloride 99   102     Calcium 10.0   10.0     Total Protein 7.1   6.6     Albumin 4.6   4.50     Total Bilirubin 0.4   0.3     Alkaline Phosphatase 107   88     AST (SGOT) 17   15     ALT (SGPT) 23   23     WBC      8.22   Hemoglobin      12.4   Hematocrit      36.3   Platelets      309   Total Cholesterol  296 (A)   191    Triglycerides  250 (A)   175 (A)    HDL Cholesterol  65   51    LDL Cholesterol   184 (A)   110 (A)    Hemoglobin A1C   5.6      (A) Abnormal value       Comments are available for some flowsheets but are not being displayed.                     Assessment and Plan    Diagnoses and all orders for this visit:    1. Generalized abdominal pain (Primary)  Assessment & Plan:  Labs today lipase, cbc, and bmp  Rule out pancreatitis  Advise plenty of water  Follow up as scheduled  Go to ER if worsening of symptoms     Orders:  -     Lipase  -     CBC No Differential  -     Basic metabolic panel      Follow Up   No follow-ups on file.  Patient was given instructions and counseling regarding her condition or for health maintenance advice. Please see specific information pulled into the AVS if appropriate.

## 2021-10-05 DIAGNOSIS — R19.7 DIARRHEA, UNSPECIFIED TYPE: Primary | ICD-10-CM

## 2021-10-08 PROBLEM — Z12.11 ENCOUNTER FOR SCREENING FOR MALIGNANT NEOPLASM OF COLON: Status: ACTIVE | Noted: 2021-10-08

## 2021-12-10 RX ORDER — BUPROPION HYDROCHLORIDE 150 MG/1
150 TABLET ORAL DAILY
Qty: 90 TABLET | Refills: 1 | Status: SHIPPED | OUTPATIENT
Start: 2021-12-10 | End: 2023-02-24

## 2021-12-10 RX ORDER — BUPROPION HYDROCHLORIDE 300 MG/1
300 TABLET ORAL DAILY
Qty: 90 TABLET | Refills: 1 | Status: SHIPPED | OUTPATIENT
Start: 2021-12-10 | End: 2022-03-03 | Stop reason: SDUPTHER

## 2021-12-10 RX ORDER — OLMESARTAN MEDOXOMIL AND HYDROCHLOROTHIAZIDE 20/12.5 20; 12.5 MG/1; MG/1
1 TABLET ORAL DAILY
Qty: 90 TABLET | Refills: 1 | Status: SHIPPED | OUTPATIENT
Start: 2021-12-10 | End: 2022-03-03 | Stop reason: SDUPTHER

## 2021-12-13 RX ORDER — OMEPRAZOLE 40 MG/1
40 CAPSULE, DELAYED RELEASE ORAL DAILY
Qty: 90 CAPSULE | Refills: 4 | Status: SHIPPED | OUTPATIENT
Start: 2021-12-13

## 2022-01-17 RX ORDER — LEVOTHYROXINE SODIUM 150 UG/1
150 CAPSULE ORAL DAILY
Qty: 30 CAPSULE | Refills: 2 | Status: SHIPPED | OUTPATIENT
Start: 2022-01-17 | End: 2022-05-16

## 2022-01-18 ENCOUNTER — HOSPITAL ENCOUNTER (OUTPATIENT)
Facility: HOSPITAL | Age: 57
Setting detail: HOSPITAL OUTPATIENT SURGERY
Discharge: HOME | End: 2022-01-18
Attending: INTERNAL MEDICINE | Admitting: INTERNAL MEDICINE

## 2022-01-18 ENCOUNTER — ANESTHESIA EVENT (OUTPATIENT)
Dept: GASTROENTEROLOGY | Facility: HOSPITAL | Age: 57
End: 2022-01-18

## 2022-01-18 ENCOUNTER — ANESTHESIA (OUTPATIENT)
Dept: GASTROENTEROLOGY | Facility: HOSPITAL | Age: 57
End: 2022-01-18

## 2022-01-18 VITALS
OXYGEN SATURATION: 95 % | BODY MASS INDEX: 39.55 KG/M2 | SYSTOLIC BLOOD PRESSURE: 116 MMHG | DIASTOLIC BLOOD PRESSURE: 61 MMHG | WEIGHT: 252 LBS | RESPIRATION RATE: 16 BRPM | HEART RATE: 78 BPM | HEIGHT: 67 IN

## 2022-01-18 DIAGNOSIS — Z12.11 ENCOUNTER FOR SCREENING FOR MALIGNANT NEOPLASM OF COLON: ICD-10-CM

## 2022-01-18 PROCEDURE — 45380 COLONOSCOPY AND BIOPSY: CPT | Performed by: INTERNAL MEDICINE

## 2022-01-18 PROCEDURE — 25010000002 PROPOFOL 10 MG/ML EMULSION: Performed by: ANESTHESIOLOGY

## 2022-01-18 PROCEDURE — 88305 TISSUE EXAM BY PATHOLOGIST: CPT | Performed by: INTERNAL MEDICINE

## 2022-01-18 PROCEDURE — S0260 H&P FOR SURGERY: HCPCS | Performed by: INTERNAL MEDICINE

## 2022-01-18 RX ORDER — PROPOFOL 10 MG/ML
VIAL (ML) INTRAVENOUS CONTINUOUS PRN
Status: DISCONTINUED | OUTPATIENT
Start: 2022-01-18 | End: 2022-01-18 | Stop reason: SURG

## 2022-01-18 RX ORDER — SODIUM CHLORIDE, SODIUM LACTATE, POTASSIUM CHLORIDE, CALCIUM CHLORIDE 600; 310; 30; 20 MG/100ML; MG/100ML; MG/100ML; MG/100ML
30 INJECTION, SOLUTION INTRAVENOUS CONTINUOUS PRN
Status: DISCONTINUED | OUTPATIENT
Start: 2022-01-18 | End: 2022-01-18 | Stop reason: HOSPADM

## 2022-01-18 RX ORDER — LIDOCAINE HYDROCHLORIDE 20 MG/ML
INJECTION, SOLUTION INFILTRATION; PERINEURAL AS NEEDED
Status: DISCONTINUED | OUTPATIENT
Start: 2022-01-18 | End: 2022-01-18 | Stop reason: SURG

## 2022-01-18 RX ORDER — PROPOFOL 10 MG/ML
VIAL (ML) INTRAVENOUS AS NEEDED
Status: DISCONTINUED | OUTPATIENT
Start: 2022-01-18 | End: 2022-01-18 | Stop reason: SURG

## 2022-01-18 RX ADMIN — PROPOFOL 30 MG: 10 INJECTION, EMULSION INTRAVENOUS at 10:07

## 2022-01-18 RX ADMIN — SODIUM CHLORIDE, POTASSIUM CHLORIDE, SODIUM LACTATE AND CALCIUM CHLORIDE 30 ML/HR: 600; 310; 30; 20 INJECTION, SOLUTION INTRAVENOUS at 09:50

## 2022-01-18 RX ADMIN — PROPOFOL 120 MG: 10 INJECTION, EMULSION INTRAVENOUS at 09:57

## 2022-01-18 RX ADMIN — Medication 140 MCG/KG/MIN: at 09:57

## 2022-01-18 RX ADMIN — PROPOFOL 40 MG: 10 INJECTION, EMULSION INTRAVENOUS at 10:05

## 2022-01-18 RX ADMIN — LIDOCAINE HYDROCHLORIDE 60 MG: 20 INJECTION, SOLUTION INFILTRATION; PERINEURAL at 09:57

## 2022-01-18 RX ADMIN — PROPOFOL 60 MG: 10 INJECTION, EMULSION INTRAVENOUS at 10:01

## 2022-01-18 NOTE — H&P
[facility]  Pre Procedure History & Physical    Chief Complaint:   screening    Subjective     HPI:   55 yo here today for initial colonoscopy.  Pt reports no FH CRC/polyps.  Patient denies GI symptoms currrently.       Past Medical History:   Past Medical History:   Diagnosis Date   • Hyperlipidemia    • Hypothyroidism    • Nephrolithiasis    • Polycystic ovarian syndrome        Past Surgical History:  Past Surgical History:   Procedure Laterality Date   • BREAST BIOPSY      OPEN   • BREAST BIOPSY      RIGHT BREAST BIOPSY DURING BREAST SURGERY   •  SECTION     • GALLBLADDER SURGERY     • KNEE ARTHROSCOPY Bilateral        Family History:  Family History   Problem Relation Age of Onset   • Heart disease Mother    • Hypertension Mother    • Heart disease Father    • Hypertension Father    • Nephrolithiasis Father    • Malig Hyperthermia Neg Hx        Social History:   reports that she has never smoked. She has never used smokeless tobacco. She reports that she does not drink alcohol and does not use drugs.    Medications:   Facility-Administered Medications Prior to Admission   Medication Dose Route Frequency Provider Last Rate Last Admin   • cyanocobalamin injection 1,000 mcg  1,000 mcg Intramuscular Q28 Days Colette Lancaster MD   1,000 mcg at 21 0930     Medications Prior to Admission   Medication Sig Dispense Refill Last Dose   • buPROPion XL (WELLBUTRIN XL) 150 MG 24 hr tablet TAKE 1 TABLET BY MOUTH DAILY 90 tablet 1 2022 at Unknown time   • buPROPion XL (WELLBUTRIN XL) 300 MG 24 hr tablet TAKE 1 TABLET BY MOUTH DAILY 90 tablet 1 2022 at Unknown time   • levothyroxine sodium (TIROSINT) 150 MCG capsule TAKE 1 CAPSULE BY MOUTH DAILY 30 capsule 2 2022 at Unknown time   • liothyronine (CYTOMEL) 5 MCG tablet TAKE 1 TABLET BY MOUTH EVERY DAY 90 tablet 0 2022 at Unknown time   • olmesartan-hydrochlorothiazide (BENICAR HCT) 20-12.5 MG per tablet TAKE 1 TABLET BY  "MOUTH DAILY 90 tablet 1 1/17/2022 at Unknown time   • omeprazole (priLOSEC) 40 MG capsule TAKE 1 CAPSULE BY MOUTH DAILY 90 capsule 4 1/16/2022 at Unknown time   • vitamin D (ERGOCALCIFEROL) 1.25 MG (29615 UT) capsule capsule Take 1 capsule by mouth Every 7 (Seven) Days. 12 capsule 0 12/27/2021 at Unknown time   • Insulin Pen Needle (Pen Needles 5/16\") 31G X 8 MM misc 1 each 1 (One) Time Per Week. 4 each 5    • Semaglutide, 1 MG/DOSE, (Ozempic, 1 MG/DOSE,) 2 MG/1.5ML solution pen-injector Inject 1 mg under the skin into the appropriate area as directed 1 (One) Time Per Week. 1 pen 5        Allergies:  Patient has no known allergies.    ROS:    Pertinent items are noted in HPI, all other systems reviewed and negative     Objective     Blood pressure 128/69, pulse 72, resp. rate 16, height 170.2 cm (67\"), weight 114 kg (252 lb), SpO2 99 %.    Physical Exam   Constitutional: Pt is oriented to person, place, and time and well-developed, well-nourished, and in no distress.   Mouth/Throat: Oropharynx is clear and moist.   Neck: Normal range of motion.   Cardiovascular: Normal rate, regular rhythm    Pulmonary/Chest: Effort normal    Abdominal: Soft. Nontender  Skin: Skin is warm and dry.   Psychiatric: Mood, memory, affect and judgment normal.     Assessment/Plan     Diagnosis:  Screening for colon cancer    Anticipated Surgical Procedure:  colonoscopy    The risks, benefits, and alternatives of this procedure have been discussed with the patient or the responsible party- the patient understands and agrees to proceed.                                                              "

## 2022-01-18 NOTE — ANESTHESIA PREPROCEDURE EVALUATION
Anesthesia Evaluation     Patient summary reviewed and Nursing notes reviewed   NPO Solid Status: > 8 hours             Airway   Mallampati: II  TM distance: >3 FB  Neck ROM: full  no difficulty expected  Dental - normal exam     Pulmonary - negative pulmonary ROS and normal exam   Cardiovascular - normal exam    (+) hypertension,       Neuro/Psych- negative ROS  GI/Hepatic/Renal/Endo    (+) obesity,   thyroid problem hypothyroidism    Musculoskeletal (-) negative ROS    Abdominal  - normal exam   Substance History - negative use     OB/GYN negative ob/gyn ROS         Other                        Anesthesia Plan    ASA 2     MAC     intravenous induction     Anesthetic plan, all risks, benefits, and alternatives have been provided, discussed and informed consent has been obtained with: patient.    Plan discussed with CRNA.        CODE STATUS:

## 2022-01-18 NOTE — ANESTHESIA POSTPROCEDURE EVALUATION
Patient: John Monk    Procedure Summary     Date: 01/18/22 Room / Location: [facility] ENDOSCOPY 5 / [facility] ENDOSCOPY    Anesthesia Start: 0951 Anesthesia Stop: 1019    Procedure: COLONOSCOPY to cecum with cold bx polypectomy (N/A ) Diagnosis:       Encounter for screening for malignant neoplasm of colon      (Encounter for screening for malignant neoplasm of colon [Z12.11])    Surgeons: Dana Nathan MD Provider: Ken Valerio MD    Anesthesia Type: MAC ASA Status: 2          Anesthesia Type: MAC    Vitals  Vitals Value Taken Time   /61 01/18/22 1038   Temp     Pulse 78 01/18/22 1038   Resp 16 01/18/22 1038   SpO2 95 % 01/18/22 1038           Post Anesthesia Care and Evaluation    Patient location during evaluation: bedside  Patient participation: complete - patient participated  Level of consciousness: awake  Pain management: adequate  Airway patency: patent  Anesthetic complications: No anesthetic complications  PONV Status: controlled  Cardiovascular status: acceptable  Respiratory status: acceptable  Hydration status: acceptable    Comments: --------------------            01/18/22               1038     --------------------   BP:       116/61     Pulse:      78       Resp:       16       SpO2:      95%      --------------------

## 2022-01-19 LAB
LAB AP CASE REPORT: NORMAL
PATH REPORT.FINAL DX SPEC: NORMAL
PATH REPORT.GROSS SPEC: NORMAL

## 2022-01-25 ENCOUNTER — TELEPHONE (OUTPATIENT)
Dept: GASTROENTEROLOGY | Facility: CLINIC | Age: 57
End: 2022-01-25

## 2022-01-25 NOTE — TELEPHONE ENCOUNTER
----- Message from Dana Nathan MD sent at 1/24/2022  5:39 PM EST -----  The polyp(s) showed hyperplastic change, which is non-cancerous and not pre-cancerous. Follow-up colonoscopy in 10 years is advised.

## 2022-03-03 ENCOUNTER — OFFICE VISIT (OUTPATIENT)
Dept: INTERNAL MEDICINE | Facility: CLINIC | Age: 57
End: 2022-03-03

## 2022-03-03 VITALS
BODY MASS INDEX: 40.65 KG/M2 | HEART RATE: 81 BPM | WEIGHT: 259 LBS | DIASTOLIC BLOOD PRESSURE: 80 MMHG | OXYGEN SATURATION: 98 % | TEMPERATURE: 98.4 F | SYSTOLIC BLOOD PRESSURE: 110 MMHG | HEIGHT: 67 IN

## 2022-03-03 DIAGNOSIS — E55.9 VITAMIN D DEFICIENCY: ICD-10-CM

## 2022-03-03 DIAGNOSIS — Z76.89 ENCOUNTER TO ESTABLISH CARE: Primary | ICD-10-CM

## 2022-03-03 DIAGNOSIS — E03.9 HYPOTHYROIDISM, ADULT: ICD-10-CM

## 2022-03-03 DIAGNOSIS — E66.01 MORBIDLY OBESE: ICD-10-CM

## 2022-03-03 DIAGNOSIS — E53.8 VITAMIN B 12 DEFICIENCY: ICD-10-CM

## 2022-03-03 DIAGNOSIS — I10 ESSENTIAL HYPERTENSION: ICD-10-CM

## 2022-03-03 PROCEDURE — 96372 THER/PROPH/DIAG INJ SC/IM: CPT | Performed by: FAMILY MEDICINE

## 2022-03-03 PROCEDURE — 99214 OFFICE O/P EST MOD 30 MIN: CPT | Performed by: FAMILY MEDICINE

## 2022-03-03 RX ORDER — LIOTHYRONINE SODIUM 5 UG/1
5 TABLET ORAL DAILY
Qty: 90 TABLET | Refills: 1 | Status: SHIPPED | OUTPATIENT
Start: 2022-03-03

## 2022-03-03 RX ORDER — CYANOCOBALAMIN 1000 UG/ML
1000 INJECTION, SOLUTION INTRAMUSCULAR; SUBCUTANEOUS
Status: DISCONTINUED | OUTPATIENT
Start: 2022-03-03 | End: 2022-09-26

## 2022-03-03 RX ORDER — MELOXICAM 7.5 MG/1
7.5 TABLET ORAL DAILY
Qty: 30 TABLET | Refills: 1 | Status: SHIPPED | OUTPATIENT
Start: 2022-03-03 | End: 2022-09-26

## 2022-03-03 RX ORDER — OLMESARTAN MEDOXOMIL AND HYDROCHLOROTHIAZIDE 20/12.5 20; 12.5 MG/1; MG/1
1 TABLET ORAL DAILY
Qty: 90 TABLET | Refills: 1 | Status: SHIPPED | OUTPATIENT
Start: 2022-03-03 | End: 2022-12-12

## 2022-03-03 RX ORDER — BUPROPION HYDROCHLORIDE 300 MG/1
300 TABLET ORAL DAILY
Qty: 90 TABLET | Refills: 1 | Status: SHIPPED | OUTPATIENT
Start: 2022-03-03 | End: 2023-02-24

## 2022-03-03 RX ORDER — ERGOCALCIFEROL 1.25 MG/1
50000 CAPSULE ORAL
Qty: 12 CAPSULE | Refills: 0 | Status: SHIPPED | OUTPATIENT
Start: 2022-03-03 | End: 2022-09-28 | Stop reason: SDUPTHER

## 2022-03-03 RX ORDER — PHENTERMINE HYDROCHLORIDE 37.5 MG/1
37.5 CAPSULE ORAL EVERY MORNING
Qty: 30 CAPSULE | Refills: 1 | Status: SHIPPED | OUTPATIENT
Start: 2022-03-03 | End: 2022-05-12

## 2022-03-03 RX ADMIN — CYANOCOBALAMIN 1000 MCG: 1000 INJECTION, SOLUTION INTRAMUSCULAR; SUBCUTANEOUS at 10:29

## 2022-03-03 NOTE — ASSESSMENT & PLAN NOTE
Hypertension is chronic, stable.  Continue current treatment regimen.  Dietary sodium restriction.  Continue current medications.  Blood pressure will be reassessed in 3 months.

## 2022-03-03 NOTE — ASSESSMENT & PLAN NOTE
Patient's (Body mass index is 40.56 kg/m².) indicates that they are morbidly obese (BMI > 40 or > 35 with obesity - related health condition) with health conditions that include hypertension and osteoarthritis . Weight is worsening. BMI is is above average; BMI management plan is completed. We discussed low calorie, low carb based diet program, portion control, increasing exercise and pharmacologic options including phenteramine for next few months. .goal to take for a few months as she works to change habits and start [employer] or weight watchers.

## 2022-03-04 LAB
ALBUMIN SERPL-MCNC: 4.5 G/DL (ref 3.8–4.9)
ALBUMIN/GLOB SERPL: 1.8 {RATIO} (ref 1.2–2.2)
ALP SERPL-CCNC: 105 IU/L (ref 44–121)
ALT SERPL-CCNC: 30 IU/L (ref 0–32)
AST SERPL-CCNC: 25 IU/L (ref 0–40)
BILIRUB SERPL-MCNC: <0.2 MG/DL (ref 0–1.2)
BUN SERPL-MCNC: 17 MG/DL (ref 6–24)
BUN/CREAT SERPL: 16 (ref 9–23)
CALCIUM SERPL-MCNC: 9.8 MG/DL (ref 8.7–10.2)
CHLORIDE SERPL-SCNC: 105 MMOL/L (ref 96–106)
CHOLEST SERPL-MCNC: 250 MG/DL (ref 100–199)
CO2 SERPL-SCNC: 22 MMOL/L (ref 20–29)
CREAT SERPL-MCNC: 1.07 MG/DL (ref 0.57–1)
EGFR GENE MUT ANL BLD/T: 62 ML/MIN/1.73
GLOBULIN SER CALC-MCNC: 2.5 G/DL (ref 1.5–4.5)
GLUCOSE SERPL-MCNC: 94 MG/DL (ref 65–99)
HBA1C MFR BLD: 5.6 % (ref 4.8–5.6)
HDLC SERPL-MCNC: 70 MG/DL
LDLC SERPL CALC-MCNC: 155 MG/DL (ref 0–99)
POTASSIUM SERPL-SCNC: 5.8 MMOL/L (ref 3.5–5.2)
PROT SERPL-MCNC: 7 G/DL (ref 6–8.5)
SODIUM SERPL-SCNC: 143 MMOL/L (ref 134–144)
T3FREE SERPL-MCNC: 2.3 PG/ML (ref 2–4.4)
T4 FREE SERPL-MCNC: 1.14 NG/DL (ref 0.82–1.77)
TRIGL SERPL-MCNC: 141 MG/DL (ref 0–149)
TSH SERPL DL<=0.005 MIU/L-ACNC: 17.7 UIU/ML (ref 0.45–4.5)
VIT B12 SERPL-MCNC: >2000 PG/ML (ref 232–1245)
VLDLC SERPL CALC-MCNC: 25 MG/DL (ref 5–40)

## 2022-05-12 DIAGNOSIS — E66.01 MORBIDLY OBESE: ICD-10-CM

## 2022-05-12 RX ORDER — PHENTERMINE HYDROCHLORIDE 37.5 MG/1
37.5 CAPSULE ORAL EVERY MORNING
Qty: 30 CAPSULE | Refills: 0 | Status: SHIPPED | OUTPATIENT
Start: 2022-05-12 | End: 2022-06-09

## 2022-05-16 RX ORDER — LEVOTHYROXINE SODIUM 150 UG/1
150 CAPSULE ORAL DAILY
Qty: 30 CAPSULE | Refills: 2 | Status: SHIPPED | OUTPATIENT
Start: 2022-05-16 | End: 2022-09-09

## 2022-05-31 ENCOUNTER — OFFICE VISIT (OUTPATIENT)
Dept: INTERNAL MEDICINE | Facility: CLINIC | Age: 57
End: 2022-05-31

## 2022-05-31 VITALS
SYSTOLIC BLOOD PRESSURE: 132 MMHG | OXYGEN SATURATION: 99 % | BODY MASS INDEX: 40.57 KG/M2 | HEART RATE: 94 BPM | HEIGHT: 67 IN | DIASTOLIC BLOOD PRESSURE: 70 MMHG | TEMPERATURE: 97.9 F

## 2022-05-31 DIAGNOSIS — L95.9 VASCULITIS LIMITED TO SKIN: ICD-10-CM

## 2022-05-31 DIAGNOSIS — M79.89 SWELLING OF LEFT LOWER EXTREMITY: Primary | ICD-10-CM

## 2022-05-31 PROCEDURE — 99213 OFFICE O/P EST LOW 20 MIN: CPT | Performed by: FAMILY MEDICINE

## 2022-06-03 ENCOUNTER — TELEPHONE (OUTPATIENT)
Dept: INTERNAL MEDICINE | Facility: CLINIC | Age: 57
End: 2022-06-03

## 2022-06-03 NOTE — TELEPHONE ENCOUNTER
Caller: John Monk    Relationship to patient: Self    Best call back number: [phone]    Patient is needing: PATIENT WAS CALLING IN TO FIND OUT WHEN HER ULTRASOUND WOULD BE AND WHERE.  YOU CAN LEAVE A MESSAGE IF SHE DOESN'T ANSWER.

## 2022-06-03 NOTE — TELEPHONE ENCOUNTER
Voicemail has been left by scheduling per referral notes. Please follow up on Monday to ensure patient returns call

## 2022-06-07 DIAGNOSIS — E66.01 MORBIDLY OBESE: ICD-10-CM

## 2022-06-09 RX ORDER — PHENTERMINE HYDROCHLORIDE 37.5 MG/1
37.5 CAPSULE ORAL EVERY MORNING
Qty: 30 CAPSULE | Refills: 0 | Status: SHIPPED | OUTPATIENT
Start: 2022-06-09 | End: 2022-09-26 | Stop reason: SDUPTHER

## 2022-06-13 ENCOUNTER — HOSPITAL ENCOUNTER (OUTPATIENT)
Dept: CARDIOLOGY | Facility: HOSPITAL | Age: 57
Discharge: HOME | End: 2022-06-13
Admitting: FAMILY MEDICINE

## 2022-06-13 LAB
BH CV LOWER VASCULAR LEFT COMMON FEMORAL AUGMENT: NORMAL
BH CV LOWER VASCULAR LEFT COMMON FEMORAL COMPETENT: NORMAL
BH CV LOWER VASCULAR LEFT COMMON FEMORAL COMPRESS: NORMAL
BH CV LOWER VASCULAR LEFT COMMON FEMORAL PHASIC: NORMAL
BH CV LOWER VASCULAR LEFT COMMON FEMORAL SPONT: NORMAL
BH CV LOWER VASCULAR LEFT DISTAL FEMORAL COMPRESS: NORMAL
BH CV LOWER VASCULAR LEFT GASTRONEMIUS COMPRESS: NORMAL
BH CV LOWER VASCULAR LEFT GREATER SAPH AK COMPRESS: NORMAL
BH CV LOWER VASCULAR LEFT GREATER SAPH BK COMPRESS: NORMAL
BH CV LOWER VASCULAR LEFT LESSER SAPH COMPRESS: NORMAL
BH CV LOWER VASCULAR LEFT MID FEMORAL AUGMENT: NORMAL
BH CV LOWER VASCULAR LEFT MID FEMORAL COMPETENT: NORMAL
BH CV LOWER VASCULAR LEFT MID FEMORAL COMPRESS: NORMAL
BH CV LOWER VASCULAR LEFT MID FEMORAL PHASIC: NORMAL
BH CV LOWER VASCULAR LEFT MID FEMORAL SPONT: NORMAL
BH CV LOWER VASCULAR LEFT PERONEAL COMPRESS: NORMAL
BH CV LOWER VASCULAR LEFT POPLITEAL AUGMENT: NORMAL
BH CV LOWER VASCULAR LEFT POPLITEAL COMPETENT: NORMAL
BH CV LOWER VASCULAR LEFT POPLITEAL COMPRESS: NORMAL
BH CV LOWER VASCULAR LEFT POPLITEAL PHASIC: NORMAL
BH CV LOWER VASCULAR LEFT POPLITEAL SPONT: NORMAL
BH CV LOWER VASCULAR LEFT POSTERIOR TIBIAL COMPRESS: NORMAL
BH CV LOWER VASCULAR LEFT PROFUNDA FEMORAL COMPRESS: NORMAL
BH CV LOWER VASCULAR LEFT PROXIMAL FEMORAL COMPRESS: NORMAL
BH CV LOWER VASCULAR LEFT SAPHENOFEMORAL JUNCTION COMPRESS: NORMAL
BH CV LOWER VASCULAR RIGHT COMMON FEMORAL AUGMENT: NORMAL
BH CV LOWER VASCULAR RIGHT COMMON FEMORAL COMPETENT: NORMAL
BH CV LOWER VASCULAR RIGHT COMMON FEMORAL COMPRESS: NORMAL
BH CV LOWER VASCULAR RIGHT COMMON FEMORAL PHASIC: NORMAL
BH CV LOWER VASCULAR RIGHT COMMON FEMORAL SPONT: NORMAL
MAXIMAL PREDICTED HEART RATE: 166 BPM
STRESS TARGET HR: 141 BPM

## 2022-06-13 PROCEDURE — 93971 EXTREMITY STUDY: CPT

## 2022-09-09 RX ORDER — LEVOTHYROXINE SODIUM 0.15 MG/1
TABLET ORAL
Qty: 30 TABLET | Refills: 0 | Status: SHIPPED | OUTPATIENT
Start: 2022-09-09 | End: 2022-10-20 | Stop reason: SDUPTHER

## 2022-09-26 ENCOUNTER — OFFICE VISIT (OUTPATIENT)
Dept: INTERNAL MEDICINE | Facility: CLINIC | Age: 57
End: 2022-09-26

## 2022-09-26 VITALS
TEMPERATURE: 98.4 F | HEART RATE: 82 BPM | OXYGEN SATURATION: 97 % | SYSTOLIC BLOOD PRESSURE: 112 MMHG | DIASTOLIC BLOOD PRESSURE: 64 MMHG | WEIGHT: 246 LBS | BODY MASS INDEX: 38.61 KG/M2 | HEIGHT: 67 IN

## 2022-09-26 DIAGNOSIS — E53.8 VITAMIN B 12 DEFICIENCY: Primary | ICD-10-CM

## 2022-09-26 DIAGNOSIS — E03.9 HYPOTHYROIDISM, ADULT: ICD-10-CM

## 2022-09-26 DIAGNOSIS — E66.01 MORBIDLY OBESE: ICD-10-CM

## 2022-09-26 DIAGNOSIS — I10 ESSENTIAL HYPERTENSION: ICD-10-CM

## 2022-09-26 PROCEDURE — 99396 PREV VISIT EST AGE 40-64: CPT | Performed by: FAMILY MEDICINE

## 2022-09-26 PROCEDURE — 96372 THER/PROPH/DIAG INJ SC/IM: CPT | Performed by: FAMILY MEDICINE

## 2022-09-26 RX ORDER — CYANOCOBALAMIN 1000 UG/ML
1000 INJECTION, SOLUTION INTRAMUSCULAR; SUBCUTANEOUS ONCE
Status: COMPLETED | OUTPATIENT
Start: 2022-09-26 | End: 2022-09-26

## 2022-09-26 RX ORDER — PHENTERMINE HYDROCHLORIDE 37.5 MG/1
37.5 CAPSULE ORAL EVERY MORNING
Qty: 30 CAPSULE | Refills: 1 | Status: SHIPPED | OUTPATIENT
Start: 2022-09-26 | End: 2023-03-13

## 2022-09-26 RX ADMIN — CYANOCOBALAMIN 1000 MCG: 1000 INJECTION, SOLUTION INTRAMUSCULAR; SUBCUTANEOUS at 16:31

## 2022-09-26 NOTE — ASSESSMENT & PLAN NOTE
Chronic, not controlled   she states she has issues taking cytomel daily as prescribed  She is taking levothyroxine daily each morning on empty stomach.   Check tsh today

## 2022-09-26 NOTE — ASSESSMENT & PLAN NOTE
Patient's (Body mass index is 38.53 kg/m².) indicates that they are morbidly obese (BMI > 40 or > 35 with obesity - related health condition) with health conditions that include hypertension and osteoarthritis . Weight is improved some with medication . BMI is is above average; BMI management plan is completed. We discussed portion control, increasing exercise, joining a fitness center or start home based exercise program and Weight Watchers or other Commercial based weight reduction program.  Discussion in office to make mindset change to help with long term results. She will take phentermine for another month as she works on plan to join a gym or weight loss center. May try to join gym with daughter.

## 2022-09-26 NOTE — PROGRESS NOTES
"Chief Complaint  Annual Exam and Med Refill    Subjective            John Monk presents to [facility]  History of Present Illness    CPE- Patient reporting that health is doing well overall.  Patient is here today for annual physical.  Trying to eat a balanced diet.     Labs: Due for routine labs    Colorectal cancer screening: up to date  Breast cancer screening: up to date, April 2023  Cervical cancer screening: up to date  Immunization - covid, shingles and flu  Dental- up to date  Vision- up to date        PHQ-2 Depression Screening  Little interest or pleasure in doing things? 0-->not at all   Feeling down, depressed, or hopeless? 0-->not at all   PHQ-2 Total Score 0     Social History     Socioeconomic History   • Marital status:    Tobacco Use   • Smoking status: Never Smoker   • Smokeless tobacco: Never Used   Vaping Use   • Vaping Use: Never used   Substance and Sexual Activity   • Alcohol use: No   • Drug use: No   • Sexual activity: Defer       Review of Systems   Constitutional: Negative for chills and fever.   HENT: Negative for congestion, rhinorrhea and sinus pressure.    Eyes: Negative for visual disturbance.   Respiratory: Negative for shortness of breath.    Cardiovascular: Negative for leg swelling.   Gastrointestinal: Negative for abdominal pain, diarrhea, nausea and vomiting.   Genitourinary: Negative for difficulty urinating.   Musculoskeletal: Positive for arthralgias. Negative for back pain.   Skin: Negative for rash.         Objective   Vital Signs:   /64   Pulse 82   Temp 98.4 °F (36.9 °C)   Ht 170.2 cm (67\")   Wt 112 kg (246 lb)   SpO2 97%   BMI 38.53 kg/m²     Physical Exam  Constitutional:       General: She is not in acute distress.     Appearance: She is obese. She is not ill-appearing.   HENT:      Right Ear: Tympanic membrane normal.      Left Ear: Tympanic membrane normal.      Mouth/Throat:      Pharynx: No oropharyngeal exudate " or posterior oropharyngeal erythema.   Eyes:      Conjunctiva/sclera: Conjunctivae normal.   Cardiovascular:      Rate and Rhythm: Regular rhythm.      Heart sounds: Normal heart sounds.   Pulmonary:      Effort: No respiratory distress.      Breath sounds: Normal breath sounds. No wheezing.   Abdominal:      General: There is no distension.      Palpations: Abdomen is soft.      Tenderness: There is no abdominal tenderness.   Musculoskeletal:      Right lower leg: No edema.      Left lower leg: No edema.   Neurological:      Mental Status: She is alert and oriented to person, place, and time.        Result Review :   The following data was reviewed by: Abby Squires MD on 09/26/2022:  Common labs    Common Labs 3/3/22 3/3/22 3/3/22    1055 1055 1055   Glucose  94    BUN  17    Creatinine  1.07 (A)    Sodium  143    Potassium  5.8 (A)    Chloride  105    Calcium  9.8    Total Protein  7.0    Albumin  4.5    Total Bilirubin  <0.2    Alkaline Phosphatase  105    AST (SGOT)  25    ALT (SGPT)  30    Total Cholesterol 250 (A)     Triglycerides 141     HDL Cholesterol 70     LDL Cholesterol  155 (A)     Hemoglobin A1C   5.6   (A) Abnormal value       Comments are available for some flowsheets but are not being displayed.                  Current Outpatient Medications:   •  olmesartan-hydrochlorothiazide (BENICAR HCT) 20-12.5 MG per tablet, Take 1 tablet by mouth Daily., Disp: 90 tablet, Rfl: 1  •  phentermine 37.5 MG capsule, Take 1 capsule by mouth Every Morning., Disp: 30 capsule, Rfl: 1  •  vitamin D (ERGOCALCIFEROL) 1.25 MG (62978 UT) capsule capsule, Take 1 capsule by mouth Every 7 (Seven) Days., Disp: 12 capsule, Rfl: 0  •  buPROPion XL (WELLBUTRIN XL) 150 MG 24 hr tablet, TAKE 1 TABLET BY MOUTH DAILY, Disp: 90 tablet, Rfl: 1  •  buPROPion XL (WELLBUTRIN XL) 300 MG 24 hr tablet, Take 1 tablet by mouth Daily., Disp: 90 tablet, Rfl: 1  •  levothyroxine (SYNTHROID, LEVOTHROID) 150 MCG tablet, TAKE 1 TABLET BY MOUTH  DAILY, Disp: 30 tablet, Rfl: 0  •  liothyronine (CYTOMEL) 5 MCG tablet, Take 1 tablet by mouth Daily. (Patient taking differently: Take 5 mcg by mouth Daily. Every other day), Disp: 90 tablet, Rfl: 1  •  omeprazole (priLOSEC) 40 MG capsule, TAKE 1 CAPSULE BY MOUTH DAILY (Patient taking differently: Take 40 mg by mouth As Needed.), Disp: 90 capsule, Rfl: 4  No current facility-administered medications for this visit.     Assessment and Plan    Diagnoses and all orders for this visit:    1. Vitamin B 12 deficiency (Primary)  Assessment & Plan:  Elevated last evaluation   Pt state she has not taken any oral supplement in several months 3-4  Unsure if last evaluation altered by the injection she was given that day in office.    Repeat vitamin b12 level today     Orders:  -     CBC No Differential  -     cyanocobalamin injection 1,000 mcg  -     Vitamin B12    2. Morbidly obese (HCC)  Assessment & Plan:  Patient's (Body mass index is 38.53 kg/m².) indicates that they are morbidly obese (BMI > 40 or > 35 with obesity - related health condition) with health conditions that include hypertension and osteoarthritis . Weight is improved some with medication . BMI is is above average; BMI management plan is completed. We discussed portion control, increasing exercise, joining a fitness center or start home based exercise program and Weight Watchers or other Commercial based weight reduction program.  Discussion in office to make mindset change to help with long term results. She will take phentermine for another month as she works on plan to join a gym or weight loss center. May try to join gym with daughter.     Orders:  -     phentermine 37.5 MG capsule; Take 1 capsule by mouth Every Morning.  Dispense: 30 capsule; Refill: 1  -     Lipid panel  -     TSH  -     Hemoglobin A1c  -     Urinalysis With Microscopic - Urine, Clean Catch  -     CBC No Differential  -     Cancel: Vitamin B12  -     Vitamin D 25 hydroxy    3.  Essential hypertension  -     Comprehensive metabolic panel    4. Hypothyroidism, adult  Assessment & Plan:  Chronic, not controlled   she states she has issues taking cytomel daily as prescribed  She is taking levothyroxine daily each morning on empty stomach.   Check tsh today     Orders:  -     TSH        The patient was counseled regarding nutrition, physical activity, healthy weight, injury prevention, misuse of tobacco, alcohol and illicit drugs, mental health, immunizations, and screenings.      Follow Up   Return in about 4 months (around 1/26/2023) for Recheck thyroid.  Patient was given instructions and counseling regarding her condition or for health maintenance advice. Please see specific information pulled into the AVS if appropriate.

## 2022-09-26 NOTE — ASSESSMENT & PLAN NOTE
Elevated last evaluation   Pt state she has not taken any oral supplement in several months 3-4  Unsure if last evaluation altered by the injection she was given that day in office.    Repeat vitamin b12 level today

## 2022-09-27 LAB
ALBUMIN SERPL-MCNC: 5 G/DL (ref 3.8–4.9)
ALBUMIN/GLOB SERPL: 2.1 {RATIO} (ref 1.2–2.2)
ALP SERPL-CCNC: 90 IU/L (ref 44–121)
ALT SERPL-CCNC: 20 IU/L (ref 0–32)
APPEARANCE UR: CLEAR
AST SERPL-CCNC: 20 IU/L (ref 0–40)
BACTERIA #/AREA URNS HPF: ABNORMAL /[HPF]
BILIRUB SERPL-MCNC: 0.6 MG/DL (ref 0–1.2)
BILIRUB UR QL STRIP: NEGATIVE
BUN SERPL-MCNC: 19 MG/DL (ref 6–24)
BUN/CREAT SERPL: 17 (ref 9–23)
CALCIUM SERPL-MCNC: 10.1 MG/DL (ref 8.7–10.2)
CASTS URNS QL MICRO: ABNORMAL /LPF
CHLORIDE SERPL-SCNC: 95 MMOL/L (ref 96–106)
CHOLEST SERPL-MCNC: 238 MG/DL (ref 100–199)
CO2 SERPL-SCNC: 23 MMOL/L (ref 20–29)
COLOR UR: YELLOW
CREAT SERPL-MCNC: 1.14 MG/DL (ref 0.57–1)
EGFRCR SERPLBLD CKD-EPI 2021: 57 ML/MIN/1.73
EPI CELLS #/AREA URNS HPF: ABNORMAL /HPF (ref 0–10)
ERYTHROCYTE [DISTWIDTH] IN BLOOD BY AUTOMATED COUNT: 12.8 % (ref 11.7–15.4)
GLOBULIN SER CALC-MCNC: 2.4 G/DL (ref 1.5–4.5)
GLUCOSE SERPL-MCNC: 80 MG/DL (ref 70–99)
GLUCOSE UR QL STRIP: NEGATIVE
HBA1C MFR BLD: 5.9 % (ref 4.8–5.6)
HCT VFR BLD AUTO: 40.9 % (ref 34–46.6)
HDLC SERPL-MCNC: 63 MG/DL
HGB BLD-MCNC: 13.6 G/DL (ref 11.1–15.9)
HGB UR QL STRIP: NEGATIVE
KETONES UR QL STRIP: NEGATIVE
LDLC SERPL CALC-MCNC: 160 MG/DL (ref 0–99)
LEUKOCYTE ESTERASE UR QL STRIP: ABNORMAL
MCH RBC QN AUTO: 26.9 PG (ref 26.6–33)
MCHC RBC AUTO-ENTMCNC: 33.3 G/DL (ref 31.5–35.7)
MCV RBC AUTO: 81 FL (ref 79–97)
MICRO URNS: ABNORMAL
NITRITE UR QL STRIP: NEGATIVE
PH UR STRIP: 6.5 [PH] (ref 5–7.5)
PLATELET # BLD AUTO: 341 X10E3/UL (ref 150–450)
POTASSIUM SERPL-SCNC: 4.8 MMOL/L (ref 3.5–5.2)
PROT SERPL-MCNC: 7.4 G/DL (ref 6–8.5)
PROT UR QL STRIP: NEGATIVE
RBC # BLD AUTO: 5.06 X10E6/UL (ref 3.77–5.28)
RBC #/AREA URNS HPF: ABNORMAL /HPF (ref 0–2)
SODIUM SERPL-SCNC: 136 MMOL/L (ref 134–144)
SP GR UR STRIP: 1.01 (ref 1–1.03)
TRIGL SERPL-MCNC: 89 MG/DL (ref 0–149)
TSH SERPL DL<=0.005 MIU/L-ACNC: 6.34 UIU/ML (ref 0.45–4.5)
UROBILINOGEN UR STRIP-MCNC: 0.2 MG/DL (ref 0.2–1)
VIT B12 SERPL-MCNC: 505 PG/ML (ref 232–1245)
VLDLC SERPL CALC-MCNC: 15 MG/DL (ref 5–40)
WBC # BLD AUTO: 8.9 X10E3/UL (ref 3.4–10.8)
WBC #/AREA URNS HPF: ABNORMAL /HPF (ref 0–5)

## 2022-09-28 LAB
25(OH)D3+25(OH)D2 SERPL-MCNC: 21.6 NG/ML (ref 30–100)
Lab: NORMAL
WRITTEN AUTHORIZATION: NORMAL

## 2022-09-28 RX ORDER — ERGOCALCIFEROL 1.25 MG/1
50000 CAPSULE ORAL
Qty: 12 CAPSULE | Refills: 0 | Status: SHIPPED | OUTPATIENT
Start: 2022-09-28 | End: 2023-02-27 | Stop reason: SDUPTHER

## 2022-10-05 ENCOUNTER — OFFICE VISIT (OUTPATIENT)
Dept: INTERNAL MEDICINE | Facility: CLINIC | Age: 57
End: 2022-10-05

## 2022-10-05 VITALS
OXYGEN SATURATION: 97 % | HEIGHT: 67 IN | BODY MASS INDEX: 38.61 KG/M2 | TEMPERATURE: 98.2 F | WEIGHT: 246 LBS | HEART RATE: 90 BPM | SYSTOLIC BLOOD PRESSURE: 120 MMHG | DIASTOLIC BLOOD PRESSURE: 84 MMHG

## 2022-10-05 DIAGNOSIS — J04.0 LARYNGITIS: ICD-10-CM

## 2022-10-05 DIAGNOSIS — H04.129 DRY EYE: ICD-10-CM

## 2022-10-05 DIAGNOSIS — R53.83 FATIGUE, UNSPECIFIED TYPE: ICD-10-CM

## 2022-10-05 DIAGNOSIS — J02.9 SORE THROAT: Primary | ICD-10-CM

## 2022-10-05 LAB
EXPIRATION DATE: NORMAL
EXPIRATION DATE: NORMAL
FLUAV AG UPPER RESP QL IA.RAPID: NOT DETECTED
FLUBV AG UPPER RESP QL IA.RAPID: NOT DETECTED
INTERNAL CONTROL: NORMAL
INTERNAL CONTROL: NORMAL
Lab: NORMAL
Lab: NORMAL
S PYO AG THROAT QL: NEGATIVE
SARS-COV-2 AG UPPER RESP QL IA.RAPID: NOT DETECTED

## 2022-10-05 PROCEDURE — 87428 SARSCOV & INF VIR A&B AG IA: CPT | Performed by: NURSE PRACTITIONER

## 2022-10-05 PROCEDURE — 87880 STREP A ASSAY W/OPTIC: CPT | Performed by: NURSE PRACTITIONER

## 2022-10-05 PROCEDURE — 99213 OFFICE O/P EST LOW 20 MIN: CPT | Performed by: NURSE PRACTITIONER

## 2022-10-05 RX ORDER — DEXTROMETHORPHAN HYDROBROMIDE AND PROMETHAZINE HYDROCHLORIDE 15; 6.25 MG/5ML; MG/5ML
2.5-5 SYRUP ORAL 4 TIMES DAILY PRN
Qty: 180 ML | Refills: 0 | Status: SHIPPED | OUTPATIENT
Start: 2022-10-05 | End: 2023-02-24

## 2022-10-05 NOTE — PROGRESS NOTES
"Chief Complaint  Cough, Sore Throat (Pt states her throat hurts very bad. Present 2 days ), Headache, and Eye Pain (Pt states she feels like there is a foreign body in her left eye)    Subjective        John Monk presents to [facility]  History of Present Illness  Patient presents for evaluation of cough, congestion and sore throat.  This is a 57-year-old female patient of Dr. Squires.  This patient is new to me.  Symptoms began yesterday.  She endorses a sore throat that worsened today.  Worsening fatigue, dry cough and some pressure in her ears and sinuses.  Mild headache.  No fever or chills.  She had a negative rapid home COVID-19 test yesterday.  She also endorses some dryness and discomfort of the left eye, no trauma to the area or known foreign bodies present.  She has had 3 COVID-19 vaccines.  Denies shortness of breath or chest pain.  Denies development of other new issues today.      Objective   Vital Signs:  /84 (BP Location: Left arm, Patient Position: Sitting, Cuff Size: Adult)   Pulse 90   Temp 98.2 °F (36.8 °C) (Infrared)   Ht 170.2 cm (67\")   Wt 112 kg (246 lb)   SpO2 97%   BMI 38.53 kg/m²   Estimated body mass index is 38.53 kg/m² as calculated from the following:    Height as of this encounter: 170.2 cm (67\").    Weight as of this encounter: 112 kg (246 lb).          Physical Exam  Vitals and nursing note reviewed.   Constitutional:       Appearance: Normal appearance. She is well-developed.   HENT:      Head: Normocephalic and atraumatic.      Right Ear: Tympanic membrane, ear canal and external ear normal.      Left Ear: Tympanic membrane, ear canal and external ear normal.   Eyes:      Pupils: Pupils are equal, round, and reactive to light.      Comments: Dry eyes bilaterally   Cardiovascular:      Rate and Rhythm: Normal rate and regular rhythm.      Pulses: Normal pulses.      Heart sounds: Normal heart sounds.   Pulmonary:      Effort: Pulmonary " effort is normal. No respiratory distress.      Breath sounds: Normal breath sounds. No stridor. No wheezing, rhonchi or rales.   Chest:      Chest wall: No tenderness.   Abdominal:      General: Bowel sounds are normal.      Palpations: Abdomen is soft.      Tenderness: There is no abdominal tenderness.   Musculoskeletal:         General: Normal range of motion.      Cervical back: Normal range of motion and neck supple.   Skin:     General: Skin is warm and dry.      Capillary Refill: Capillary refill takes less than 2 seconds.   Neurological:      General: No focal deficit present.      Mental Status: She is alert and oriented to person, place, and time.   Psychiatric:         Mood and Affect: Mood normal.         Behavior: Behavior normal.         Thought Content: Thought content normal.         Judgment: Judgment normal.        Result Review :  The following data was reviewed by: LUISITO Saldana on 10/05/2022:  Common labs    Common Labs 3/3/22 3/3/22 3/3/22 9/26/22 9/26/22 9/26/22 9/26/22    1055 1055 1055 1626 1626 1626 1626   Glucose  94   80     BUN  17   19     Creatinine  1.07 (A)   1.14 (A)     Sodium  143   136     Potassium  5.8 (A)   4.8     Chloride  105   95 (A)     Calcium  9.8   10.1     Total Protein  7.0   7.4     Albumin  4.5   5.0 (A)     Total Bilirubin  <0.2   0.6     Alkaline Phosphatase  105   90     AST (SGOT)  25   20     ALT (SGPT)  30   20     WBC       8.9   Hemoglobin       13.6   Hematocrit       40.9   Platelets       341   Total Cholesterol 250 (A)   238 (A)      Triglycerides 141   89      HDL Cholesterol 70   63      LDL Cholesterol  155 (A)   160 (A)      Hemoglobin A1C   5.6   5.9 (A)    (A) Abnormal value       Comments are available for some flowsheets but are not being displayed.           Current outpatient and discharge medications have been reconciled for the patient.  Reviewed by: LUISITO Saldana           Assessment and Plan   Diagnoses and all orders for  this visit:    1. Sore throat (Primary)  -     POCT rapid strep A  -     POCT SARS-CoV-2 Antigen THI + Flu  -     phenol (CHLORASEPTIC) 1.4 % liquid liquid; Apply 1 spray to the mouth or throat Every 2 (Two) Hours As Needed (Sore throat).  Dispense: 118 mL; Refill: 0  -     promethazine-dextromethorphan (PROMETHAZINE-DM) 6.25-15 MG/5ML syrup; Take 2.5-5 mL by mouth 4 (Four) Times a Day As Needed for Cough.  Dispense: 180 mL; Refill: 0    2. Fatigue, unspecified type  -     POCT SARS-CoV-2 Antigen THI + Flu    3. Dry eye    4. Laryngitis  -     phenol (CHLORASEPTIC) 1.4 % liquid liquid; Apply 1 spray to the mouth or throat Every 2 (Two) Hours As Needed (Sore throat).  Dispense: 118 mL; Refill: 0  -     promethazine-dextromethorphan (PROMETHAZINE-DM) 6.25-15 MG/5ML syrup; Take 2.5-5 mL by mouth 4 (Four) Times a Day As Needed for Cough.  Dispense: 180 mL; Refill: 0      Strep, COVID-19 and rapid flu tests are negative today.    I believe she has a viral URI causing laryngitis, dry cough, fatigue.  I have asked her to use symptomatic treatment including Chloraseptic throat spray, Promethazine DM which are sent as prescriptions.  She will try salt water gargles and warm liquids to drink along with rest, hydration and good nutrition.    Follow-up as needed if symptoms persist or worsen and routinely with PCP, Dr. Squires.       Follow Up   No follow-ups on file.  Patient was given instructions and counseling regarding her condition or for health maintenance advice. Please see specific information pulled into the AVS if appropriate.

## 2022-10-20 NOTE — TELEPHONE ENCOUNTER
Caller: John Monk    Relationship: Self    Best call back number: [phone]       Requested Prescriptions:   Requested Prescriptions     Pending Prescriptions Disp Refills   • levothyroxine (SYNTHROID, LEVOTHROID) 150 MCG tablet 30 tablet 0     Sig: Take 1 tablet by mouth Daily.        Pharmacy where request should be sent: [id number] [address] AT [facility] [address] - [phone] [facility] [phone] FX     Additional details provided by patient:     PATIENT IS OUT OF MEDICATION.  SHE WAS ALSO UNDER THE UNDERSTANDING THAT HER MEDICATION WAS GOING TO BE INCREASED IN STRENGTH BUT SHE HAS NOT GOTTEN A DIFFERENT SCRIPT SENT IN.      PLEASE CALL AND ADVISE PATIENT    Does the patient have less than a 3 day supply:  [x] Yes  [] No    Tennille Bassett Rep   10/20/22 14:14 EDT

## 2022-10-24 RX ORDER — LEVOTHYROXINE SODIUM 175 UG/1
175 TABLET ORAL DAILY
Qty: 90 TABLET | Refills: 1 | Status: SHIPPED | OUTPATIENT
Start: 2022-10-24 | End: 2023-02-27

## 2022-12-12 RX ORDER — OLMESARTAN MEDOXOMIL AND HYDROCHLOROTHIAZIDE 20/12.5 20; 12.5 MG/1; MG/1
1 TABLET ORAL DAILY
Qty: 90 TABLET | Refills: 1 | Status: SHIPPED | OUTPATIENT
Start: 2022-12-12 | End: 2023-03-13

## 2023-02-24 ENCOUNTER — OFFICE VISIT (OUTPATIENT)
Dept: FAMILY MEDICINE CLINIC | Facility: CLINIC | Age: 58
End: 2023-02-24
Payer: COMMERCIAL

## 2023-02-24 VITALS
SYSTOLIC BLOOD PRESSURE: 138 MMHG | HEART RATE: 77 BPM | HEIGHT: 67 IN | OXYGEN SATURATION: 99 % | TEMPERATURE: 98.2 F | DIASTOLIC BLOOD PRESSURE: 83 MMHG | WEIGHT: 255 LBS | BODY MASS INDEX: 40.02 KG/M2 | RESPIRATION RATE: 16 BRPM

## 2023-02-24 DIAGNOSIS — E78.5 HYPERLIPIDEMIA, UNSPECIFIED HYPERLIPIDEMIA TYPE: ICD-10-CM

## 2023-02-24 DIAGNOSIS — R73.03 PREDIABETES: ICD-10-CM

## 2023-02-24 DIAGNOSIS — I10 ESSENTIAL HYPERTENSION: Chronic | ICD-10-CM

## 2023-02-24 DIAGNOSIS — E55.9 VITAMIN D DEFICIENCY: Chronic | ICD-10-CM

## 2023-02-24 DIAGNOSIS — E03.9 HYPOTHYROIDISM, ADULT: Primary | Chronic | ICD-10-CM

## 2023-02-24 PROCEDURE — 99214 OFFICE O/P EST MOD 30 MIN: CPT | Performed by: FAMILY MEDICINE

## 2023-02-24 RX ORDER — NALTREXONE HYDROCHLORIDE AND BUPROPION HYDROCHLORIDE 8; 90 MG/1; MG/1
TABLET, EXTENDED RELEASE ORAL
Qty: 120 TABLET | Refills: 1 | Status: SHIPPED | OUTPATIENT
Start: 2023-02-24 | End: 2023-02-24

## 2023-02-24 RX ORDER — NALTREXONE HYDROCHLORIDE AND BUPROPION HYDROCHLORIDE 8; 90 MG/1; MG/1
TABLET, EXTENDED RELEASE ORAL
Qty: 120 TABLET | Refills: 1 | Status: SHIPPED | OUTPATIENT
Start: 2023-02-24

## 2023-02-25 LAB
25(OH)D3+25(OH)D2 SERPL-MCNC: 21.5 NG/ML (ref 30–100)
ALBUMIN SERPL-MCNC: 5.1 G/DL (ref 3.8–4.9)
ALBUMIN/GLOB SERPL: 1.8 {RATIO} (ref 1.2–2.2)
ALP SERPL-CCNC: 88 IU/L (ref 44–121)
ALT SERPL-CCNC: 19 IU/L (ref 0–32)
AST SERPL-CCNC: 12 IU/L (ref 0–40)
BILIRUB SERPL-MCNC: 0.5 MG/DL (ref 0–1.2)
BUN SERPL-MCNC: 28 MG/DL (ref 6–24)
BUN/CREAT SERPL: 32 (ref 9–23)
CALCIUM SERPL-MCNC: 10.4 MG/DL (ref 8.7–10.2)
CHLORIDE SERPL-SCNC: 100 MMOL/L (ref 96–106)
CHOLEST SERPL-MCNC: 230 MG/DL (ref 100–199)
CO2 SERPL-SCNC: 22 MMOL/L (ref 20–29)
CREAT SERPL-MCNC: 0.88 MG/DL (ref 0.57–1)
EGFRCR SERPLBLD CKD-EPI 2021: 78 ML/MIN/1.73
GLOBULIN SER CALC-MCNC: 2.8 G/DL (ref 1.5–4.5)
GLUCOSE SERPL-MCNC: 92 MG/DL (ref 70–99)
HBA1C MFR BLD: 5.8 % (ref 4.8–5.6)
HDLC SERPL-MCNC: 73 MG/DL
LDLC SERPL CALC-MCNC: 130 MG/DL (ref 0–99)
POTASSIUM SERPL-SCNC: 4.9 MMOL/L (ref 3.5–5.2)
PROT SERPL-MCNC: 7.9 G/DL (ref 6–8.5)
SODIUM SERPL-SCNC: 139 MMOL/L (ref 134–144)
T4 FREE SERPL-MCNC: 2.55 NG/DL (ref 0.82–1.77)
TRIGL SERPL-MCNC: 154 MG/DL (ref 0–149)
TSH SERPL DL<=0.005 MIU/L-ACNC: 0.05 UIU/ML (ref 0.45–4.5)
VLDLC SERPL CALC-MCNC: 27 MG/DL (ref 5–40)

## 2023-02-27 RX ORDER — ERGOCALCIFEROL 1.25 MG/1
50000 CAPSULE ORAL
Qty: 12 CAPSULE | Refills: 0 | Status: SHIPPED | OUTPATIENT
Start: 2023-02-27

## 2023-02-27 RX ORDER — LEVOTHYROXINE SODIUM 137 UG/1
137 TABLET ORAL
Qty: 90 TABLET | Refills: 1 | Status: SHIPPED | OUTPATIENT
Start: 2023-02-27

## 2023-03-06 ENCOUNTER — TELEPHONE (OUTPATIENT)
Dept: FAMILY MEDICINE CLINIC | Facility: CLINIC | Age: 58
End: 2023-03-06

## 2023-03-13 PROBLEM — E78.5 HYPERLIPIDEMIA: Status: ACTIVE | Noted: 2023-03-13

## 2023-03-13 PROBLEM — R73.03 PREDIABETES: Status: ACTIVE | Noted: 2023-03-13

## 2023-03-13 RX ORDER — OLMESARTAN MEDOXOMIL AND HYDROCHLOROTHIAZIDE 20/12.5 20; 12.5 MG/1; MG/1
1 TABLET ORAL DAILY
Qty: 90 TABLET | Refills: 1 | Status: SHIPPED | OUTPATIENT
Start: 2023-03-13

## 2023-06-01 ENCOUNTER — OFFICE VISIT (OUTPATIENT)
Dept: FAMILY MEDICINE CLINIC | Facility: CLINIC | Age: 58
End: 2023-06-01

## 2023-06-01 VITALS
HEART RATE: 75 BPM | SYSTOLIC BLOOD PRESSURE: 118 MMHG | DIASTOLIC BLOOD PRESSURE: 78 MMHG | OXYGEN SATURATION: 100 % | HEIGHT: 67 IN | BODY MASS INDEX: 40.65 KG/M2 | TEMPERATURE: 97.3 F | WEIGHT: 259 LBS

## 2023-06-01 DIAGNOSIS — I10 ESSENTIAL HYPERTENSION: Chronic | ICD-10-CM

## 2023-06-01 DIAGNOSIS — E66.01 CLASS 3 SEVERE OBESITY DUE TO EXCESS CALORIES WITH SERIOUS COMORBIDITY AND BODY MASS INDEX (BMI) OF 40.0 TO 44.9 IN ADULT: ICD-10-CM

## 2023-06-01 DIAGNOSIS — R73.03 PREDIABETES: ICD-10-CM

## 2023-06-01 DIAGNOSIS — L30.9 DERMATITIS: ICD-10-CM

## 2023-06-01 DIAGNOSIS — E78.5 HYPERLIPIDEMIA, UNSPECIFIED HYPERLIPIDEMIA TYPE: ICD-10-CM

## 2023-06-01 DIAGNOSIS — E03.9 HYPOTHYROIDISM, ADULT: Primary | Chronic | ICD-10-CM

## 2023-06-01 PROBLEM — E66.813 CLASS 3 SEVERE OBESITY DUE TO EXCESS CALORIES WITH SERIOUS COMORBIDITY AND BODY MASS INDEX (BMI) OF 40.0 TO 44.9 IN ADULT: Status: ACTIVE | Noted: 2021-02-28

## 2023-06-01 PROCEDURE — 99214 OFFICE O/P EST MOD 30 MIN: CPT | Performed by: FAMILY MEDICINE

## 2023-06-01 RX ORDER — SEMAGLUTIDE 0.25 MG/.5ML
0.25 INJECTION, SOLUTION SUBCUTANEOUS
Qty: 1.5 ML | Refills: 0 | Status: SHIPPED | OUTPATIENT
Start: 2023-06-01

## 2023-06-01 NOTE — ASSESSMENT & PLAN NOTE
Monitor Tsh check today   Medication adjustments previously   No history of thyroid cancer per patient   Discussed risk thyroid cancer  With wegovy pt understands.

## 2023-06-01 NOTE — PROGRESS NOTES
"    Chief Complaint  f/u weight loss    Subjective    History of Present Illness {CC  Problem List  Visit  Diagnosis   Encounters  Notes  Medications  Labs  Result Review Imaging  Media :23}     John Monk presents to [facility] for   History of Present Illness     58 yo female present for follow up visit. She has been trying to loss weight.  She is not taking the contrave, states it really made her irritable.  States only took for about 10 days ago.      Having increase knee pain and swelling. Unable to loose weight as had hope with other medication. Did not tolerate.     Social History     Socioeconomic History   • Marital status:    Tobacco Use   • Smoking status: Never   • Smokeless tobacco: Never   Vaping Use   • Vaping Use: Never used   Substance and Sexual Activity   • Alcohol use: No   • Drug use: No   • Sexual activity: Defer      Objective     Vital Signs:   /78   Pulse 75   Temp 97.3 °F (36.3 °C)   Ht 170.2 cm (67\")   Wt 117 kg (259 lb)   SpO2 100%   BMI 40.57 kg/m²   Physical Exam  Constitutional:       General: She is not in acute distress.     Appearance: She is obese. She is not ill-appearing.   HENT:      Head: Normocephalic.   Cardiovascular:      Rate and Rhythm: Regular rhythm.      Heart sounds: Normal heart sounds.   Pulmonary:      Effort: No respiratory distress.      Breath sounds: Normal breath sounds. No wheezing.   Neurological:      Mental Status: She is alert and oriented to person, place, and time.   Psychiatric:         Mood and Affect: Mood normal.        Result Review  Data Reviewed:{ Labs  Result Review  Imaging  Med Tab  Media :23}   The following data was reviewed by: Abby Squires MD on 06/01/2023  Lab Results - Last 18 Months   Lab Units 02/24/23  1452 09/26/22  1626 03/03/22  1055   BUN mg/dL 28* 19 17   CREATININE mg/dL 0.88 1.14* 1.07*   SODIUM mmol/L 139 136 143   POTASSIUM mmol/L 4.9 4.8 5.8*   CHLORIDE " mmol/L 100 95* 105   CALCIUM mg/dL 10.4* 10.1 9.8   ALBUMIN g/dL 5.1* 5.0* 4.5   BILIRUBIN mg/dL 0.5 0.6 <0.2   ALK PHOS IU/L 88 90 105   AST (SGOT) IU/L 12 20 25   ALT (SGPT) IU/L 19 20 30   CHOLESTEROL mg/dL 230* 238* 250*   TRIGLYCERIDES mg/dL 154* 89 141   HDL CHOL mg/dL 73 63 70   VLDL CHOLESTEROL FIDENCIO mg/dL 27 15 25   LDL CHOL mg/dL 130* 160* 155*   HEMOGLOBIN A1C % 5.8* 5.9* 5.6   WBC x10E3/uL  --  8.9  --    RBC x10E6/uL  --  5.06  --    HEMATOCRIT %  --  40.9  --    MCV fL  --  81  --    MCH pg  --  26.9  --    TSH uIU/mL 0.048* 6.340* 17.700*   FREE T4 ng/dL 2.55*  --  1.14   VIT D 25 HYDROXY ng/mL 21.5* 21.6*  --               Current Outpatient Medications:   •  levothyroxine (SYNTHROID, LEVOTHROID) 137 MCG tablet, Take 1 tablet by mouth Every Morning., Disp: 90 tablet, Rfl: 1  •  olmesartan-hydrochlorothiazide (BENICAR HCT) 20-12.5 MG per tablet, TAKE 1 TABLET BY MOUTH DAILY, Disp: 90 tablet, Rfl: 1  •  omeprazole (priLOSEC) 40 MG capsule, TAKE 1 CAPSULE BY MOUTH DAILY (Patient taking differently: Take 1 capsule by mouth As Needed.), Disp: 90 capsule, Rfl: 4  •  vitamin D (ERGOCALCIFEROL) 1.25 MG (15298 UT) capsule capsule, Take 1 capsule by mouth Every 7 (Seven) Days., Disp: 12 capsule, Rfl: 0  •  Semaglutide-Weight Management (Wegovy) 0.25 MG/0.5ML solution auto-injector, Inject 0.25 mg under the skin into the appropriate area as directed Every 7 (Seven) Days., Disp: 1.5 mL, Rfl: 0      Assessment and Plan {CC Problem List  Visit Diagnosis  ROS  Review (Popup)  Health Maintenance  Quality  BestPractice  Medications  SmartSets  SnapShot Encounters  Media :23}   Problem List Items Addressed This Visit        Cardiac and Vasculature    Essential hypertension (Chronic)    Current Assessment & Plan     Hypertension is chronic, stable on medication .  Continue current treatment regimen.  Dietary sodium restriction.  Regular aerobic exercise.  Continue current medications.  Ambulatory blood  pressure monitoring.  Blood pressure will be reassessed at the next regular appointment.         Relevant Orders    Comprehensive Metabolic Panel    Hyperlipidemia    Relevant Orders    Comprehensive Metabolic Panel    Lipid panel       Endocrine and Metabolic    Hypothyroidism, adult - Primary (Chronic)    Current Assessment & Plan     Monitor Tsh check today   Medication adjustments previously   No history of thyroid cancer per patient   Discussed risk thyroid cancer  With wegovy pt understands.          Relevant Orders    TSH Rfx On Abnormal To Free T4    Class 3 severe obesity due to excess calories with serious comorbidity and body mass index (BMI) of 40.0 to 44.9 in adult    Current Assessment & Plan     Patient's (Body mass index is 40.57 kg/m².) indicates that they are morbidly/severely obese (BMI > 40 or > 35 with obesity - related health condition) with health conditions that include hypertension, dyslipidemias and osteoarthritis . Weight is worsening. BMI  is above average; BMI management plan is completed. We discussed portion control, increasing exercise and pharmacologic options including wegovy prescribed today. Has not tolerated other medication for weight loss in the past. .          Prediabetes    Relevant Orders    Hemoglobin A1c   Other Visit Diagnoses     Dermatitis        Relevant Orders    Ambulatory Referral to Dermatology        She is ready to loss weight. Increase issues with knee pain, history of arthritis.   Discuss side effect of medication including thyroid cancer risk, n/v, diarrhea, GI discomfort.  She understands risk, will monitor for symptoms recommend not to skip meals with medication. Will follow up in 2 months, advise increase dose every month.           Follow Up   Return in about 8 weeks (around 7/27/2023) for Recheck weight .  Patient was given instructions and counseling regarding her condition or for health maintenance advice. Please see specific information pulled into  the AVS if appropriate.    EpicAct:MR_[email or web address]_ORDERS,RunParams:STARTUPTYPE=FOLLOW    MR_[email or web address]_DISCHARGE  Answers for HPI/ROS submitted by the patient on 5/31/2023  Please describe your symptoms.: Addressing obesity problem. Requesting trial of Wegovy or Mounjaro. I couldn’t tolarate Contrave.  My weight still the same unfortunately.  Have you had these symptoms before?: Yes  How long have you been having these symptoms?: Greater than 2 weeks  Please list any medications you are currently taking for this condition.: Nothing at this time  Please describe any probable cause for these symptoms. : Chronic Obesity  What is the primary reason for your visit?: Other       Itraconazole Pregnancy And Lactation Text: This medication is Pregnancy Category C and it isn't know if it is safe during pregnancy. It is also excreted in breast milk.

## 2023-06-01 NOTE — ASSESSMENT & PLAN NOTE
Patient's (Body mass index is 40.57 kg/m².) indicates that they are morbidly/severely obese (BMI > 40 or > 35 with obesity - related health condition) with health conditions that include hypertension, dyslipidemias and osteoarthritis . Weight is worsening. BMI  is above average; BMI management plan is completed. We discussed portion control, increasing exercise and pharmacologic options including wegovy prescribed today. Has not tolerated other medication for weight loss in the past. .

## 2023-06-02 LAB
ALBUMIN SERPL-MCNC: 4.5 G/DL (ref 3.8–4.9)
ALBUMIN/GLOB SERPL: 1.7 {RATIO} (ref 1.2–2.2)
ALP SERPL-CCNC: 100 IU/L (ref 44–121)
ALT SERPL-CCNC: 45 IU/L (ref 0–32)
AST SERPL-CCNC: 24 IU/L (ref 0–40)
BILIRUB SERPL-MCNC: 0.4 MG/DL (ref 0–1.2)
BUN SERPL-MCNC: 20 MG/DL (ref 6–24)
BUN/CREAT SERPL: 19 (ref 9–23)
CALCIUM SERPL-MCNC: 9.9 MG/DL (ref 8.7–10.2)
CHLORIDE SERPL-SCNC: 96 MMOL/L (ref 96–106)
CHOLEST SERPL-MCNC: 263 MG/DL (ref 100–199)
CO2 SERPL-SCNC: 22 MMOL/L (ref 20–29)
CREAT SERPL-MCNC: 1.04 MG/DL (ref 0.57–1)
EGFRCR SERPLBLD CKD-EPI 2021: 63 ML/MIN/1.73
GLOBULIN SER CALC-MCNC: 2.7 G/DL (ref 1.5–4.5)
GLUCOSE SERPL-MCNC: 78 MG/DL (ref 70–99)
HBA1C MFR BLD: 5.6 % (ref 4.8–5.6)
HDLC SERPL-MCNC: 63 MG/DL
LDLC SERPL CALC-MCNC: 147 MG/DL (ref 0–99)
POTASSIUM SERPL-SCNC: 4.6 MMOL/L (ref 3.5–5.2)
PROT SERPL-MCNC: 7.2 G/DL (ref 6–8.5)
SODIUM SERPL-SCNC: 136 MMOL/L (ref 134–144)
TRIGL SERPL-MCNC: 292 MG/DL (ref 0–149)
TSH SERPL DL<=0.005 MIU/L-ACNC: 1.79 UIU/ML (ref 0.45–4.5)
VLDLC SERPL CALC-MCNC: 53 MG/DL (ref 5–40)

## 2023-06-05 ENCOUNTER — TELEPHONE (OUTPATIENT)
Dept: FAMILY MEDICINE CLINIC | Facility: CLINIC | Age: 58
End: 2023-06-05
Payer: COMMERCIAL

## 2023-06-06 ENCOUNTER — TELEPHONE (OUTPATIENT)
Dept: FAMILY MEDICINE CLINIC | Facility: CLINIC | Age: 58
End: 2023-06-06
Payer: COMMERCIAL

## 2023-06-30 ENCOUNTER — TELEPHONE (OUTPATIENT)
Dept: FAMILY MEDICINE CLINIC | Facility: CLINIC | Age: 58
End: 2023-06-30

## 2023-06-30 NOTE — TELEPHONE ENCOUNTER
Caller: John Monk    Relationship to patient: Self    Best call back number: [phone]    PATIENT STATES THAT Semaglutide-Weight Management (Wegovy) 0.5 MG IS OUT OF STOCK AT THE PHARMACY.  SINCE THE 0.5 MG IS OUT OF STOCK, PATIENT IS REQUESTING TO KNOW IF TWO OF TH 0.25 MG CAN BE PRESCRIBED. PATIENT IS DUE TO TAKE INJECTION NOW. PLEASE ADVISE.         [id number] - [facility] [address] AT [facility] [address] - [phone] [facility] [phone]  [phone]

## 2023-07-03 NOTE — TELEPHONE ENCOUNTER
Caller: John Monk    Relationship: Self    Best call back number:     [phone] (Mobile)     CAN LEAVE MESSAGE ON VM       What was the call regarding:  REQUESTING CALL BACK FROM OFFICE TODAY REGARDING THIS REQUEST       Is it okay if the provider responds through [employer]:

## 2023-07-27 ENCOUNTER — TELEPHONE (OUTPATIENT)
Dept: FAMILY MEDICINE CLINIC | Facility: CLINIC | Age: 58
End: 2023-07-27
Payer: COMMERCIAL

## 2023-07-27 RX ORDER — SEMAGLUTIDE 0.25 MG/.5ML
0.25 INJECTION, SOLUTION SUBCUTANEOUS
Qty: 1.5 ML | Refills: 0 | Status: CANCELLED | OUTPATIENT
Start: 2023-07-27

## 2023-07-31 RX ORDER — SEMAGLUTIDE 0.5 MG/.5ML
0.5 INJECTION, SOLUTION SUBCUTANEOUS WEEKLY
Qty: 2 ML | Refills: 0 | Status: SHIPPED | OUTPATIENT
Start: 2023-07-31 | End: 2023-08-03

## 2023-08-07 ENCOUNTER — TELEPHONE (OUTPATIENT)
Dept: FAMILY MEDICINE CLINIC | Facility: CLINIC | Age: 58
End: 2023-08-07
Payer: COMMERCIAL

## 2023-08-07 NOTE — TELEPHONE ENCOUNTER
Caller: John Monk    Relationship to patient: Self    Best call back number: [phone]     Patient is needing: PATIENT IS DUE FOR HER NEXT DOSE OF WEGOVY TODAY AND IT IS BACKORDERED AT HER DOSAGE AT EVERY PHARMACY SHE HAS CALLED. IS THERE A SUBSTITUTE THAT DR. LOZADA CAN SEND IN INSTEAD? PLEASE CALL OR SEND MESSAGE THRU [email or web address].    [id number] [address] AT [facility] [address] - [phone] [facility] [phone]

## 2023-08-08 NOTE — TELEPHONE ENCOUNTER
Please call pharmacy to see if they have the dose she was previously taking as we can just refill that dose for another month until new dose is in stock.

## 2023-08-09 ENCOUNTER — TELEPHONE (OUTPATIENT)
Dept: FAMILY MEDICINE CLINIC | Facility: CLINIC | Age: 58
End: 2023-08-09
Payer: COMMERCIAL

## 2023-08-09 NOTE — TELEPHONE ENCOUNTER
Spoke with pt the office has been notified from wegovy reps of the shortage, will forward to  to see what could possibly be done.

## 2023-08-09 NOTE — TELEPHONE ENCOUNTER
Caller: John Monk    Relationship: Self    Best call back number: [phone]       What was the call regarding: PATIENT STATES THE PHARMACY IS NOW OUT OF THE WEGOVY 1MG AND THE 0.5MG. PATIENT STATES THE PHARMACY ALSO STATES THAT A MED REQUEST FOR OZEMPIC WAS SENT TO THEM SO SHE IS NOT SURE IF SHE IS SUPPOSED TO BE ON THE OZEMPIC INSTEAD OF THE WEGOVY. PATIENT IS NOT SURE WHAT TO DO SO SHE WOULD LIKE FOR SOMEONE TO GIVE HER A CALL ASAP     PLEASE CALL AND ADVISE

## 2023-09-25 ENCOUNTER — OFFICE VISIT (OUTPATIENT)
Dept: FAMILY MEDICINE CLINIC | Facility: CLINIC | Age: 58
End: 2023-09-25

## 2023-09-25 VITALS
HEIGHT: 67 IN | HEART RATE: 72 BPM | SYSTOLIC BLOOD PRESSURE: 124 MMHG | TEMPERATURE: 97.8 F | OXYGEN SATURATION: 98 % | BODY MASS INDEX: 40.62 KG/M2 | WEIGHT: 258.8 LBS | DIASTOLIC BLOOD PRESSURE: 70 MMHG

## 2023-09-25 DIAGNOSIS — I10 ESSENTIAL HYPERTENSION: Primary | Chronic | ICD-10-CM

## 2023-09-25 DIAGNOSIS — E55.9 VITAMIN D DEFICIENCY: Chronic | ICD-10-CM

## 2023-09-25 DIAGNOSIS — R73.03 PREDIABETES: ICD-10-CM

## 2023-09-25 DIAGNOSIS — E53.8 VITAMIN B 12 DEFICIENCY: Chronic | ICD-10-CM

## 2023-09-25 DIAGNOSIS — E03.9 HYPOTHYROIDISM, ADULT: Chronic | ICD-10-CM

## 2023-09-25 DIAGNOSIS — E78.5 HYPERLIPIDEMIA, UNSPECIFIED HYPERLIPIDEMIA TYPE: ICD-10-CM

## 2023-09-25 DIAGNOSIS — Z12.31 ENCOUNTER FOR SCREENING MAMMOGRAM FOR MALIGNANT NEOPLASM OF BREAST: ICD-10-CM

## 2023-09-25 PROCEDURE — 99214 OFFICE O/P EST MOD 30 MIN: CPT | Performed by: FAMILY MEDICINE

## 2023-09-25 PROCEDURE — 96372 THER/PROPH/DIAG INJ SC/IM: CPT | Performed by: FAMILY MEDICINE

## 2023-09-25 RX ORDER — HYALURONATE SOD, CROSS-LINKED 30 MG/3 ML
30 SYRINGE (ML) INTRAARTICULAR ONCE
COMMUNITY
Start: 2023-08-28

## 2023-09-25 RX ORDER — CYANOCOBALAMIN 1000 UG/ML
1000 INJECTION, SOLUTION INTRAMUSCULAR; SUBCUTANEOUS ONCE
Status: COMPLETED | OUTPATIENT
Start: 2023-09-25 | End: 2023-09-25

## 2023-09-25 RX ORDER — BUPROPION HYDROCHLORIDE 300 MG/1
300 TABLET ORAL DAILY
COMMUNITY

## 2023-09-25 RX ADMIN — CYANOCOBALAMIN 1000 MCG: 1000 INJECTION, SOLUTION INTRAMUSCULAR; SUBCUTANEOUS at 16:06

## 2023-09-25 NOTE — PROGRESS NOTES
"    Chief Complaint  Hypertension, restart weight loss medication, and needs labs she is fasting (Unsure of synthroid dose)    Subjective    History of Present Illness {CC  Problem List  Visit  Diagnosis   Encounters  Notes  Medications  Labs  Result Review Imaging  Media :23}     John Monk presents to [facility] for   History of Present Illness   57 yo female present for follow up visit.  She took the wegovy for one month then was on back order.  She has moved and not following healthy diet during this time.    She is ready to see if she can get medication again.   She is taking thyroid medication unsure of the dose.      She is taking BP medicaiton as prescribed.        Social History     Socioeconomic History    Marital status:    Tobacco Use    Smoking status: Never    Smokeless tobacco: Never   Vaping Use    Vaping Use: Never used   Substance and Sexual Activity    Alcohol use: No    Drug use: No    Sexual activity: Defer      Objective     Vital Signs:   /70   Pulse 72   Temp 97.8 °F (36.6 °C)   Ht 170.2 cm (67\")   Wt 117 kg (258 lb 12.8 oz)   SpO2 98%   BMI 40.53 kg/m²   Physical Exam  Constitutional:       General: She is not in acute distress.     Appearance: She is not ill-appearing.   HENT:      Head: Normocephalic.   Eyes:      Conjunctiva/sclera: Conjunctivae normal.   Cardiovascular:      Rate and Rhythm: Regular rhythm.      Heart sounds: Normal heart sounds.   Pulmonary:      Breath sounds: Normal breath sounds. No wheezing.   Musculoskeletal:      Right lower leg: No edema.      Left lower leg: No edema.   Neurological:      Mental Status: She is alert and oriented to person, place, and time.   Psychiatric:         Mood and Affect: Mood normal.      Result Review  Data Reviewed:{ Labs  Result Review  Imaging  Med Tab  Media :23}   The following data was reviewed by: Abby Squires MD on 09/25/2023  Lab Results - Last 18 Months "   Lab Units 06/01/23  1440 02/24/23  1452 09/26/22  1626   BUN mg/dL 20 28* 19   CREATININE mg/dL 1.04* 0.88 1.14*   SODIUM mmol/L 136 139 136   POTASSIUM mmol/L 4.6 4.9 4.8   CHLORIDE mmol/L 96 100 95*   CALCIUM mg/dL 9.9 10.4* 10.1   ALBUMIN g/dL 4.5 5.1* 5.0*   BILIRUBIN mg/dL 0.4 0.5 0.6   ALK PHOS IU/L 100 88 90   AST (SGOT) IU/L 24 12 20   ALT (SGPT) IU/L 45* 19 20   CHOLESTEROL mg/dL 263* 230* 238*   TRIGLYCERIDES mg/dL 292* 154* 89   HDL CHOL mg/dL 63 73 63   VLDL CHOLESTEROL FIDENCIO mg/dL 53* 27 15   LDL CHOL mg/dL 147* 130* 160*   HEMOGLOBIN A1C % 5.6 5.8* 5.9*   WBC x10E3/uL  --   --  8.9   RBC x10E6/uL  --   --  5.06   HEMATOCRIT %  --   --  40.9   MCV fL  --   --  81   MCH pg  --   --  26.9   TSH uIU/mL 1.790 0.048* 6.340*   FREE T4 ng/dL  --  2.55*  --    VIT D 25 HYDROXY ng/mL  --  21.5* 21.6*              Current Outpatient Medications:     buPROPion XL (WELLBUTRIN XL) 300 MG 24 hr tablet, Take 1 tablet by mouth Daily., Disp: , Rfl:     Gel-One 30 MG/3ML prefilled syringe injection, Inject 3 mL into the appropriate joint as directed by provider 1 (One) Time. yearly, Disp: , Rfl:     levothyroxine (SYNTHROID, LEVOTHROID) 137 MCG tablet, Take 1 tablet by mouth Every Morning., Disp: 90 tablet, Rfl: 1    olmesartan-hydrochlorothiazide (BENICAR HCT) 20-12.5 MG per tablet, TAKE 1 TABLET BY MOUTH DAILY, Disp: 90 tablet, Rfl: 1    omeprazole (priLOSEC) 40 MG capsule, TAKE 1 CAPSULE BY MOUTH DAILY (Patient taking differently: Take 1 capsule by mouth As Needed.), Disp: 90 capsule, Rfl: 4    vitamin D (ERGOCALCIFEROL) 1.25 MG (51488 UT) capsule capsule, Take 1 capsule by mouth Every 7 (Seven) Days., Disp: 12 capsule, Rfl: 0    Semaglutide-Weight Management 1 MG/0.5ML solution auto-injector, Inject 0.5 mL under the skin into the appropriate area as directed 1 (One) Time Per Week. (Patient not taking: Reported on 9/25/2023), Disp: 2 mL, Rfl: 0      Assessment and Plan {CC Problem List  Visit Diagnosis  ROS   Review (Popup)  Health Maintenance  Quality  BestPractice  Medications  SmartSets  SnapShot Encounters  Media :23}   Problem List Items Addressed This Visit          Cardiac and Vasculature    Essential hypertension - Primary (Chronic)    Current Assessment & Plan     Hypertension is  chronic, stable .  Continue current treatment regimen.  Dietary sodium restriction.  Regular aerobic exercise.  Continue current medications.  Ambulatory blood pressure monitoring.  Blood pressure will be reassessed at the next regular appointment.         Relevant Orders    Comprehensive metabolic panel    Hyperlipidemia    Relevant Orders    Comprehensive metabolic panel    Lipid panel       Endocrine and Metabolic    Vitamin D deficiency (Chronic)    Relevant Orders    Vitamin D 25 hydroxy    Vitamin B 12 deficiency (Chronic)    Relevant Medications    cyanocobalamin injection 1,000 mcg (Completed)    Other Relevant Orders    Vitamin B12    Hypothyroidism, adult (Chronic)    Relevant Orders    TSH Rfx On Abnormal To Free T4    Prediabetes    Relevant Orders    Hemoglobin A1c     Other Visit Diagnoses       Encounter for screening mammogram for malignant neoplasm of breast        Relevant Orders    Mammo screening digital tomosynthesis bilateral w CAD          Refilled wegovy, she is aware of side effects as we previously discussed before. Nothing else has help with weight loss goals.     Hyperlipidemia- fasting today, check lipid panel. Low fat high fiber diet.     Hypothyroidism -monitor tsh, adjust dose as indicated based upon results. Pt will call with the dose she is taking.          Follow Up   Return in about 3 months (around 12/25/2023) for new provider..  Patient was given instructions and counseling regarding her condition or for health maintenance advice. Please see specific information pulled into the AVS if appropriate.    EpicAct:[email or web address],RunParams:[email or web address]=FOLLOW    [email or web address]

## 2023-09-26 LAB
25(OH)D3+25(OH)D2 SERPL-MCNC: 15.5 NG/ML (ref 30–100)
ALBUMIN SERPL-MCNC: 4.5 G/DL (ref 3.8–4.9)
ALBUMIN/GLOB SERPL: 1.9 {RATIO} (ref 1.2–2.2)
ALP SERPL-CCNC: 84 IU/L (ref 44–121)
ALT SERPL-CCNC: 20 IU/L (ref 0–32)
AST SERPL-CCNC: 17 IU/L (ref 0–40)
BILIRUB SERPL-MCNC: 0.5 MG/DL (ref 0–1.2)
BUN SERPL-MCNC: 11 MG/DL (ref 6–24)
BUN/CREAT SERPL: 13 (ref 9–23)
CALCIUM SERPL-MCNC: 9.9 MG/DL (ref 8.7–10.2)
CHLORIDE SERPL-SCNC: 100 MMOL/L (ref 96–106)
CHOLEST SERPL-MCNC: 256 MG/DL (ref 100–199)
CO2 SERPL-SCNC: 26 MMOL/L (ref 20–29)
CREAT SERPL-MCNC: 0.84 MG/DL (ref 0.57–1)
EGFRCR SERPLBLD CKD-EPI 2021: 82 ML/MIN/1.73
GLOBULIN SER CALC-MCNC: 2.4 G/DL (ref 1.5–4.5)
GLUCOSE SERPL-MCNC: 78 MG/DL (ref 70–99)
HBA1C MFR BLD: 5.7 % (ref 4.8–5.6)
HDLC SERPL-MCNC: 70 MG/DL
LDLC SERPL CALC-MCNC: 155 MG/DL (ref 0–99)
POTASSIUM SERPL-SCNC: 4.6 MMOL/L (ref 3.5–5.2)
PROT SERPL-MCNC: 6.9 G/DL (ref 6–8.5)
SODIUM SERPL-SCNC: 139 MMOL/L (ref 134–144)
T4 FREE SERPL-MCNC: 0.91 NG/DL (ref 0.82–1.77)
TRIGL SERPL-MCNC: 171 MG/DL (ref 0–149)
TSH SERPL DL<=0.005 MIU/L-ACNC: 21.9 UIU/ML (ref 0.45–4.5)
VIT B12 SERPL-MCNC: 311 PG/ML (ref 232–1245)
VLDLC SERPL CALC-MCNC: 31 MG/DL (ref 5–40)

## 2023-09-27 DIAGNOSIS — E03.9 HYPOTHYROIDISM, ADULT: Primary | Chronic | ICD-10-CM

## 2023-09-27 RX ORDER — ERGOCALCIFEROL 1.25 MG/1
50000 CAPSULE ORAL WEEKLY
Qty: 12 CAPSULE | Refills: 0 | Status: SHIPPED | OUTPATIENT
Start: 2023-09-27

## 2023-09-29 ENCOUNTER — TELEPHONE (OUTPATIENT)
Dept: FAMILY MEDICINE CLINIC | Facility: CLINIC | Age: 58
End: 2023-09-29
Payer: COMMERCIAL

## 2023-09-29 RX ORDER — LEVOTHYROXINE SODIUM 137 UG/1
137 TABLET ORAL
Qty: 90 TABLET | Refills: 0 | Status: SHIPPED | OUTPATIENT
Start: 2023-09-29

## 2023-10-03 RX ORDER — ERGOCALCIFEROL 1.25 MG/1
CAPSULE ORAL
Qty: 12 CAPSULE | Refills: 0 | Status: SHIPPED | OUTPATIENT
Start: 2023-10-03

## 2023-10-06 ENCOUNTER — TELEPHONE (OUTPATIENT)
Dept: FAMILY MEDICINE CLINIC | Facility: CLINIC | Age: 58
End: 2023-10-06
Payer: COMMERCIAL

## 2023-10-06 RX ORDER — OLMESARTAN MEDOXOMIL AND HYDROCHLOROTHIAZIDE 20/12.5 20; 12.5 MG/1; MG/1
1 TABLET ORAL DAILY
Qty: 90 TABLET | Refills: 1 | Status: SHIPPED | OUTPATIENT
Start: 2023-10-06

## 2023-10-06 NOTE — TELEPHONE ENCOUNTER
Caller: John Monk    Relationship: Self    Requested Prescriptions:   Requested Prescriptions     Pending Prescriptions Disp Refills    olmesartan-hydrochlorothiazide (BENICAR HCT) 20-12.5 MG per tablet 90 tablet 1     Sig: Take 1 tablet by mouth Daily.        Pharmacy where request should be sent: [id number] - [address] - [phone]  - [phone] FX     Last office visit with prescribing clinician: 9/25/2023   Last telemedicine visit with prescribing clinician: Visit date not found   Next office visit with prescribing clinician: Visit date not found     Additional details provided by patient: PATIENT LEFT HER MEDICATION AT HOME, AND IS IN [place].  SHE IS OUT OF REFILLS, AND NEEDS AN EMERGENCY SUPPLY FOR ONE WEEK CALLED IN TO THE ABOVE PHARMACY AS SOON AS POSSIBLE.   SHE STATES SHE WAS TRAPPED IN THE [place] FOR 2 DAYS AND HAS NOT HAD THE MEDICATION.       Does the patient have less than a 3 day supply:  [x] Yes  [] No    Would you like a call back once the refill request has been completed: [] Yes [x] No    If the office needs to give you a call back, can they leave a voicemail: [] Yes [x] No    Tennille Strange Rep   10/06/23 11:15 EDT

## 2023-10-24 ENCOUNTER — HOSPITAL ENCOUNTER (OUTPATIENT)
Dept: MAMMOGRAPHY | Facility: HOSPITAL | Age: 58
Discharge: HOME | End: 2023-10-24
Admitting: FAMILY MEDICINE
Payer: COMMERCIAL

## 2023-10-24 DIAGNOSIS — Z12.31 ENCOUNTER FOR SCREENING MAMMOGRAM FOR MALIGNANT NEOPLASM OF BREAST: ICD-10-CM

## 2023-10-24 PROCEDURE — 77063 BREAST TOMOSYNTHESIS BI: CPT

## 2023-10-24 PROCEDURE — 77067 SCR MAMMO BI INCL CAD: CPT

## 2023-11-16 ENCOUNTER — OFFICE VISIT (OUTPATIENT)
Dept: ENDOCRINOLOGY | Age: 58
End: 2023-11-16
Payer: COMMERCIAL

## 2023-11-16 VITALS
WEIGHT: 256 LBS | HEART RATE: 76 BPM | HEIGHT: 67 IN | BODY MASS INDEX: 40.18 KG/M2 | DIASTOLIC BLOOD PRESSURE: 70 MMHG | TEMPERATURE: 96.9 F | SYSTOLIC BLOOD PRESSURE: 100 MMHG | OXYGEN SATURATION: 98 %

## 2023-11-16 DIAGNOSIS — E66.01 CLASS 3 SEVERE OBESITY DUE TO EXCESS CALORIES WITH SERIOUS COMORBIDITY AND BODY MASS INDEX (BMI) OF 40.0 TO 44.9 IN ADULT: ICD-10-CM

## 2023-11-16 DIAGNOSIS — E03.9 HYPOTHYROIDISM, ADULT: Primary | Chronic | ICD-10-CM

## 2023-11-16 DIAGNOSIS — E55.9 VITAMIN D DEFICIENCY: Chronic | ICD-10-CM

## 2023-11-16 DIAGNOSIS — E53.8 VITAMIN B 12 DEFICIENCY: Chronic | ICD-10-CM

## 2023-11-16 LAB
T4 FREE SERPL-MCNC: 1.62 NG/DL (ref 0.93–1.7)
TSH SERPL DL<=0.005 MIU/L-ACNC: 0.36 UIU/ML (ref 0.27–4.2)

## 2023-11-16 PROCEDURE — 99204 OFFICE O/P NEW MOD 45 MIN: CPT | Performed by: INTERNAL MEDICINE

## 2023-11-16 NOTE — PROGRESS NOTES
Referring provider: Abby Squires MD     Chief complaint: Hypothyroidism/obesity    HPI:   - 58 year old female here for hypothyroidism  - She has been on levothyroxine over 30 years  - Is currently on levothyroxine 137 mcg daily  - Denies missing any does of her levothyroxine  - She takes her levothyroxine at the same time every day, on an empty stomach, and not with hot beverages  - She states that she has struggled with her weight most of her adult life  - Her goal weight is 180  - She tried phentermine but had to stop it due to elevated BP, she tried Contrave but was not able to tolerate it either, she was also on Wegovy for 1 month but stopped it because of the shortage of Wegovy    The following portions of the patient's history were reviewed and updated as appropriate: allergies, current medications, past family history, past medical history, past social history, past surgical history, and problem list.    Objective     Vitals:    11/16/23 0929   BP: 100/70   Pulse: 76   Temp: 96.9 °F (36.1 °C)   SpO2: 98%        Physical Exam  Vitals reviewed.   Constitutional:       Appearance: Normal appearance.   HENT:      Head: Normocephalic and atraumatic.   Eyes:      General: No scleral icterus.  Pulmonary:      Effort: Pulmonary effort is normal. No respiratory distress.   Neurological:      Mental Status: She is alert.      Gait: Gait normal.   Psychiatric:         Mood and Affect: Mood normal.         Behavior: Behavior normal.         Thought Content: Thought content normal.         Judgment: Judgment normal.       Labs/Imaging:  TSH was 21.9 in 9/2023    Assessment & Plan   Hypothyroidism  - Is on levothyroxine 137 mcg daily  - Will check TSH and free T4 today    2.  Obesity (BMI of 40)  - Will prescribe Wegovy again, she states she will check with other pharmacies to see if she can get it    - Return to clinic in 6 months

## 2023-11-17 LAB
25(OH)D3+25(OH)D2 SERPL-MCNC: 26.5 NG/ML (ref 30–100)
CHOLEST SERPL-MCNC: 227 MG/DL (ref 0–200)
HBA1C MFR BLD: 5.9 % (ref 4.8–5.6)
HDLC SERPL-MCNC: 56 MG/DL (ref 40–60)
IMP & REVIEW OF LAB RESULTS: NORMAL
LDLC SERPL CALC-MCNC: 141 MG/DL (ref 0–100)
TRIGL SERPL-MCNC: 171 MG/DL (ref 0–150)
VIT B12 SERPL-MCNC: 450 PG/ML (ref 211–946)
VLDLC SERPL CALC-MCNC: 30 MG/DL (ref 5–40)

## 2023-11-27 ENCOUNTER — PATIENT MESSAGE (OUTPATIENT)
Dept: ENDOCRINOLOGY | Age: 58
End: 2023-11-27
Payer: COMMERCIAL

## 2023-12-04 NOTE — TELEPHONE ENCOUNTER
Mychart, Generic 12/3/2023 2:49 PM EST    Hi Dr Hall     I checked [facility] at ([address] [place] [address]) phone [phone]. They have Mounjaro starting dose. Would you pls order it for me there?   Thank you   John fried

## 2023-12-05 ENCOUNTER — PRIOR AUTHORIZATION (OUTPATIENT)
Dept: ENDOCRINOLOGY | Age: 58
End: 2023-12-05
Payer: COMMERCIAL

## 2023-12-27 ENCOUNTER — PATIENT OUTREACH (OUTPATIENT)
Dept: ENDOCRINOLOGY | Age: 58
End: 2023-12-27
Payer: COMMERCIAL

## 2023-12-27 ENCOUNTER — PATIENT MESSAGE (OUTPATIENT)
Dept: ENDOCRINOLOGY | Age: 58
End: 2023-12-27
Payer: COMMERCIAL

## 2023-12-27 DIAGNOSIS — E66.01 CLASS 3 SEVERE OBESITY DUE TO EXCESS CALORIES WITH SERIOUS COMORBIDITY AND BODY MASS INDEX (BMI) OF 40.0 TO 44.9 IN ADULT: Primary | ICD-10-CM

## 2024-01-04 ENCOUNTER — PRIOR AUTHORIZATION (OUTPATIENT)
Dept: ENDOCRINOLOGY | Age: 59
End: 2024-01-04
Payer: COMMERCIAL

## 2024-03-15 RX ORDER — LEVOTHYROXINE SODIUM 137 UG/1
137 TABLET ORAL
Qty: 90 TABLET | Refills: 3 | Status: SHIPPED | OUTPATIENT
Start: 2024-03-15

## 2024-04-09 RX ORDER — SEMAGLUTIDE 1 MG/.5ML
1 INJECTION, SOLUTION SUBCUTANEOUS WEEKLY
Qty: 2 ML | Refills: 5 | Status: SHIPPED | OUTPATIENT
Start: 2024-04-09

## 2024-04-10 ENCOUNTER — PRIOR AUTHORIZATION (OUTPATIENT)
Dept: ENDOCRINOLOGY | Age: 59
End: 2024-04-10
Payer: COMMERCIAL

## 2024-04-12 NOTE — TELEPHONE ENCOUNTER
Your PA request has been approved.     Additional information will be provided in the approval communication. (Message 0882).     Authorization Expiration Date: October 8, 2024.

## 2024-04-16 ENCOUNTER — OFFICE VISIT (OUTPATIENT)
Dept: INTERNAL MEDICINE | Facility: CLINIC | Age: 59
End: 2024-04-16
Payer: COMMERCIAL

## 2024-04-16 VITALS
TEMPERATURE: 98.6 F | HEIGHT: 67 IN | OXYGEN SATURATION: 97 % | DIASTOLIC BLOOD PRESSURE: 80 MMHG | BODY MASS INDEX: 39.87 KG/M2 | HEART RATE: 78 BPM | SYSTOLIC BLOOD PRESSURE: 142 MMHG | WEIGHT: 254 LBS

## 2024-04-16 DIAGNOSIS — E55.9 VITAMIN D DEFICIENCY: Chronic | ICD-10-CM

## 2024-04-16 DIAGNOSIS — E78.5 HYPERLIPIDEMIA, UNSPECIFIED HYPERLIPIDEMIA TYPE: ICD-10-CM

## 2024-04-16 DIAGNOSIS — E53.8 VITAMIN B 12 DEFICIENCY: ICD-10-CM

## 2024-04-16 DIAGNOSIS — I10 ESSENTIAL HYPERTENSION: Chronic | ICD-10-CM

## 2024-04-16 DIAGNOSIS — R73.03 PREDIABETES: ICD-10-CM

## 2024-04-16 DIAGNOSIS — Z76.0 ENCOUNTER FOR MEDICATION REFILL: ICD-10-CM

## 2024-04-16 DIAGNOSIS — Z76.89 ENCOUNTER TO ESTABLISH CARE: Primary | ICD-10-CM

## 2024-04-16 PROBLEM — F98.8 ADULT ATTENTION DEFICIT DISORDER: Status: ACTIVE | Noted: 2024-04-16

## 2024-04-16 PROBLEM — J30.9 ALLERGIC RHINITIS: Status: ACTIVE | Noted: 2024-04-16

## 2024-04-16 PROBLEM — M17.12 OSTEOARTHRITIS OF LEFT KNEE: Status: ACTIVE | Noted: 2023-02-27

## 2024-04-16 PROBLEM — E28.2 PCOS (POLYCYSTIC OVARIAN SYNDROME): Status: ACTIVE | Noted: 2024-04-16

## 2024-04-16 PROBLEM — M19.90 ARTHRITIS: Status: ACTIVE | Noted: 2024-04-16

## 2024-04-16 PROCEDURE — 99214 OFFICE O/P EST MOD 30 MIN: CPT

## 2024-04-16 RX ORDER — ERGOCALCIFEROL 1.25 MG/1
50000 CAPSULE ORAL WEEKLY
Qty: 12 CAPSULE | Refills: 0 | Status: CANCELLED | OUTPATIENT
Start: 2024-04-16

## 2024-04-16 RX ORDER — OLMESARTAN MEDOXOMIL AND HYDROCHLOROTHIAZIDE 20/12.5 20; 12.5 MG/1; MG/1
1 TABLET ORAL DAILY
Qty: 90 TABLET | Refills: 1 | Status: SHIPPED | OUTPATIENT
Start: 2024-04-16

## 2024-04-16 RX ORDER — BUPROPION HYDROCHLORIDE 150 MG/1
150 TABLET ORAL DAILY
Qty: 90 TABLET | Refills: 0 | Status: SHIPPED | OUTPATIENT
Start: 2024-04-16

## 2024-04-16 RX ORDER — BUPROPION HYDROCHLORIDE 300 MG/1
300 TABLET ORAL DAILY
Qty: 30 TABLET | Refills: 2 | Status: CANCELLED | OUTPATIENT
Start: 2024-04-16 | End: 2024-07-15

## 2024-04-17 ENCOUNTER — PATIENT ROUNDING (BHMG ONLY) (OUTPATIENT)
Dept: INTERNAL MEDICINE | Facility: CLINIC | Age: 59
End: 2024-04-17
Payer: COMMERCIAL

## 2024-04-29 NOTE — PROGRESS NOTES
"Chief Complaint  Establish Care and Med Refill    Subjective        John Monk presents to [facility]  History of Present Illness  Ms. Monk is here today to establish care.  She was previously a patient of Dr. Squires.  She is in a hurry today and does not have time to complete a physical.  She is here mainly for medication refills and follow-up.  She does not have time to draw her labs today she is wanting me to order for future day and have her complete at her convenience.  I have informed her today she is due for her annual physical and for her to schedule that appointment.  I have informed her due to being a new patient lab work is needed to refill some medications she is agreeable to this plan of care at this time.      Objective   Vital Signs:  /80 (BP Location: Right arm, Patient Position: Sitting, Cuff Size: Adult)   Pulse 78   Temp 98.6 °F (37 °C)   Ht 170.2 cm (67\")   Wt 115 kg (254 lb)   SpO2 97%   BMI 39.78 kg/m²   Estimated body mass index is 39.78 kg/m² as calculated from the following:    Height as of this encounter: 170.2 cm (67\").    Weight as of this encounter: 115 kg (254 lb).           Physical Exam  Vitals reviewed.   Constitutional:       Appearance: Normal appearance.   HENT:      Head: Normocephalic.   Cardiovascular:      Rate and Rhythm: Normal rate and regular rhythm.      Pulses: Normal pulses.      Heart sounds: Normal heart sounds.   Pulmonary:      Effort: Pulmonary effort is normal.      Breath sounds: Normal breath sounds.   Abdominal:      General: Abdomen is flat. Bowel sounds are normal.      Palpations: Abdomen is soft.   Skin:     General: Skin is warm and dry.      Capillary Refill: Capillary refill takes less than 2 seconds.   Neurological:      General: No focal deficit present.      Mental Status: She is alert.   Psychiatric:         Mood and Affect: Mood normal.         Behavior: Behavior normal.        Result Review :    The " following data was reviewed by: LUISITO Trinidad on 04/16/2024:  Common labs          6/1/2023    14:40 9/25/2023    15:41 11/16/2023    10:13   Common Labs   Glucose 78  78     BUN 20  11     Creatinine 1.04  0.84     Sodium 136  139     Potassium 4.6  4.6     Chloride 96  100     Calcium 9.9  9.9     Total Protein 7.2  6.9     Albumin 4.5  4.5     Total Bilirubin 0.4  0.5     Alkaline Phosphatase 100  84     AST (SGOT) 24  17     ALT (SGPT) 45  20     Total Cholesterol 263  256  227    Triglycerides 292  171  171    HDL Cholesterol 63  70  56    LDL Cholesterol  147  155  141    Hemoglobin A1C 5.6  5.7  5.90                   Assessment and Plan     Diagnoses and all orders for this visit:    1. Encounter to establish care (Primary)    2. Encounter for medication refill    3. Vitamin D deficiency  -     Vitamin D 25 hydroxy; Future    4. Prediabetes  -     Hemoglobin A1c; Future    5. Hyperlipidemia, unspecified hyperlipidemia type  -     Lipid Panel With / Chol / HDL Ratio; Future    6. Essential hypertension  -     CBC (No Diff); Future  -     Comprehensive Metabolic Panel; Future    7. Vitamin B 12 deficiency  -     Vitamin B12; Future    Other orders  -     olmesartan-hydrochlorothiazide (BENICAR HCT) 20-12.5 MG per tablet; Take 1 tablet by mouth Daily.  Dispense: 90 tablet; Refill: 1  -     buPROPion XL (Wellbutrin XL) 150 MG 24 hr tablet; Take 1 tablet by mouth Daily.  Dispense: 90 tablet; Refill: 0    Please complete lab work within the next week or 2 if refills are expected on B12 and vitamin D. Labs pending today. Following results our office will be in touch with you for follow up care if needed. Thank you for allowing us to care for you,  LUISITO Trinidad          Follow Up     Return in about 3 months (around 7/16/2024) for Annual physical.  Patient was given instructions and counseling regarding her condition or for health maintenance advice. Please see specific information pulled into the  AVS if appropriate.

## 2024-05-06 DIAGNOSIS — E66.01 CLASS 3 SEVERE OBESITY DUE TO EXCESS CALORIES WITH SERIOUS COMORBIDITY AND BODY MASS INDEX (BMI) OF 40.0 TO 44.9 IN ADULT: Primary | ICD-10-CM

## 2024-05-06 RX ORDER — SEMAGLUTIDE 1.7 MG/.75ML
1.7 INJECTION, SOLUTION SUBCUTANEOUS WEEKLY
Qty: 3 ML | Refills: 5 | Status: SHIPPED | OUTPATIENT
Start: 2024-05-06

## 2024-05-09 RX ORDER — OMEPRAZOLE 40 MG/1
40 CAPSULE, DELAYED RELEASE ORAL AS NEEDED
Qty: 30 CAPSULE | Refills: 2 | Status: SHIPPED | OUTPATIENT
Start: 2024-05-09

## 2024-05-20 ENCOUNTER — OFFICE VISIT (OUTPATIENT)
Dept: ENDOCRINOLOGY | Age: 59
End: 2024-05-20
Payer: COMMERCIAL

## 2024-05-20 ENCOUNTER — LAB (OUTPATIENT)
Facility: HOSPITAL | Age: 59
End: 2024-05-20
Payer: COMMERCIAL

## 2024-05-20 VITALS
BODY MASS INDEX: 38.39 KG/M2 | SYSTOLIC BLOOD PRESSURE: 126 MMHG | HEART RATE: 78 BPM | HEIGHT: 67 IN | OXYGEN SATURATION: 95 % | DIASTOLIC BLOOD PRESSURE: 76 MMHG | WEIGHT: 244.6 LBS

## 2024-05-20 DIAGNOSIS — E03.9 HYPOTHYROIDISM, ADULT: Primary | ICD-10-CM

## 2024-05-20 DIAGNOSIS — E66.01 CLASS 2 SEVERE OBESITY WITH SERIOUS COMORBIDITY IN ADULT, UNSPECIFIED BMI, UNSPECIFIED OBESITY TYPE: ICD-10-CM

## 2024-05-20 PROCEDURE — 80061 LIPID PANEL: CPT

## 2024-05-20 PROCEDURE — 83036 HEMOGLOBIN GLYCOSYLATED A1C: CPT

## 2024-05-20 PROCEDURE — 85027 COMPLETE CBC AUTOMATED: CPT

## 2024-05-20 PROCEDURE — 82306 VITAMIN D 25 HYDROXY: CPT

## 2024-05-20 PROCEDURE — 82607 VITAMIN B-12: CPT

## 2024-05-20 PROCEDURE — 80053 COMPREHEN METABOLIC PANEL: CPT

## 2024-05-20 PROCEDURE — 99214 OFFICE O/P EST MOD 30 MIN: CPT | Performed by: INTERNAL MEDICINE

## 2024-05-20 NOTE — PROGRESS NOTES
Chief complaint/Reason for consult: Obesity and hypothyroidism    HPI:   - 58 year old female here for obesity and hypothyroidism  - She started Wegovy in 2/2024  - She has lost over 20 pounds since 2/2024  - She is currently on Wegovy 1.7 mg weekly  - No complaints today other than mild constipation  - She has been on levothyroxine over 30 years  - Is currently on levothyroxine 137 mcg daily  - Denies missing any does of her levothyroxine  - Her goal weight is 180  - She tried phentermine but had to stop it due to elevated BP, she tried Contrave but was not able to tolerate it either, she was also on Wegovy for 1 month but stopped it because of the shortage of Wegovy    The following portions of the patient's history were reviewed and updated as appropriate: allergies, current medications, past family history, past medical history, past social history, past surgical history, and problem list.    Objective     Vitals:    05/20/24 0908   BP: 126/76   Pulse: 78   SpO2: 95%        Physical Exam  Vitals reviewed.   Constitutional:       Appearance: Normal appearance. She is obese.   HENT:      Head: Normocephalic.   Eyes:      General: No scleral icterus.  Pulmonary:      Effort: Pulmonary effort is normal. No respiratory distress.   Neurological:      Mental Status: She is alert.      Gait: Gait normal.   Psychiatric:         Mood and Affect: Mood normal.         Behavior: Behavior normal.         Thought Content: Thought content normal.         Judgment: Judgment normal.         Assessment & Plan   Hypothyroidism  - Is on levothyroxine 137 mcg daily  - Will check TSH today     2.  Obesity (BMI of 38.3)  - She started Wegovy in 2/2024  - She has lost over 20 pounds since 2/2024  - She is currently on Wegovy 1.7 mg weekly     - Return to clinic in 6 months

## 2024-05-21 RX ORDER — ERGOCALCIFEROL 1.25 MG/1
50000 CAPSULE ORAL WEEKLY
Qty: 12 CAPSULE | Refills: 0 | Status: SHIPPED | OUTPATIENT
Start: 2024-05-21

## 2024-06-14 RX ORDER — SEMAGLUTIDE 2.4 MG/.75ML
2.4 INJECTION, SOLUTION SUBCUTANEOUS WEEKLY
Qty: 3 ML | Refills: 5 | Status: SHIPPED | OUTPATIENT
Start: 2024-06-14

## 2024-06-14 RX ORDER — SEMAGLUTIDE 2.4 MG/.75ML
2.4 INJECTION, SOLUTION SUBCUTANEOUS WEEKLY
Qty: 3 ML | Refills: 5 | Status: SHIPPED | OUTPATIENT
Start: 2024-06-14 | End: 2024-06-14

## 2024-08-02 RX ORDER — SEMAGLUTIDE 1 MG/.5ML
1 INJECTION, SOLUTION SUBCUTANEOUS WEEKLY
Qty: 2 ML | Refills: 5 | Status: SHIPPED | OUTPATIENT
Start: 2024-08-02

## 2024-08-15 RX ORDER — OMEPRAZOLE 40 MG/1
CAPSULE, DELAYED RELEASE ORAL
Qty: 30 CAPSULE | Refills: 0 | Status: SHIPPED | OUTPATIENT
Start: 2024-08-15

## 2024-09-27 ENCOUNTER — OFFICE VISIT (OUTPATIENT)
Dept: INTERNAL MEDICINE | Facility: CLINIC | Age: 59
End: 2024-09-27
Payer: COMMERCIAL

## 2024-09-27 ENCOUNTER — PRIOR AUTHORIZATION (OUTPATIENT)
Dept: ENDOCRINOLOGY | Age: 59
End: 2024-09-27
Payer: COMMERCIAL

## 2024-09-27 VITALS
BODY MASS INDEX: 39.02 KG/M2 | SYSTOLIC BLOOD PRESSURE: 128 MMHG | HEIGHT: 67 IN | TEMPERATURE: 97.1 F | DIASTOLIC BLOOD PRESSURE: 90 MMHG | WEIGHT: 248.6 LBS | HEART RATE: 110 BPM | OXYGEN SATURATION: 98 %

## 2024-09-27 DIAGNOSIS — R59.0 CERVICAL LYMPHADENOPATHY: ICD-10-CM

## 2024-09-27 DIAGNOSIS — Z23 NEED FOR VACCINATION: ICD-10-CM

## 2024-09-27 DIAGNOSIS — E53.8 VITAMIN B 12 DEFICIENCY: Chronic | ICD-10-CM

## 2024-09-27 DIAGNOSIS — E55.9 VITAMIN D DEFICIENCY: Chronic | ICD-10-CM

## 2024-09-27 DIAGNOSIS — R22.1 MASS OF NECK: ICD-10-CM

## 2024-09-27 DIAGNOSIS — Z00.00 ROUTINE HEALTH MAINTENANCE: ICD-10-CM

## 2024-09-27 DIAGNOSIS — E78.5 HYPERLIPIDEMIA, UNSPECIFIED HYPERLIPIDEMIA TYPE: Chronic | ICD-10-CM

## 2024-09-27 DIAGNOSIS — E03.9 HYPOTHYROIDISM, ADULT: Chronic | ICD-10-CM

## 2024-09-27 DIAGNOSIS — I10 ESSENTIAL HYPERTENSION: Chronic | ICD-10-CM

## 2024-09-27 DIAGNOSIS — Z23 NEED FOR INFLUENZA VACCINATION: ICD-10-CM

## 2024-09-27 DIAGNOSIS — R73.03 PREDIABETES: ICD-10-CM

## 2024-09-27 DIAGNOSIS — Z00.00 ANNUAL PHYSICAL EXAM: Primary | ICD-10-CM

## 2024-09-27 PROCEDURE — 90480 ADMN SARSCOV2 VAC 1/ONLY CMP: CPT

## 2024-09-27 PROCEDURE — 91320 SARSCV2 VAC 30MCG TRS-SUC IM: CPT

## 2024-09-27 PROCEDURE — 99396 PREV VISIT EST AGE 40-64: CPT

## 2024-09-27 PROCEDURE — 90656 IIV3 VACC NO PRSV 0.5 ML IM: CPT

## 2024-09-27 PROCEDURE — 90471 IMMUNIZATION ADMIN: CPT

## 2024-09-27 NOTE — PATIENT INSTRUCTIONS
Patient Counseling:    Diet:    Eat vegetables, fruits, whole grain, low-fat dairy, poultry, fish, beans, nontropical vegetable oils, and nuts, but avoid red meat (i.e., Mediterranean-style diet, DASH [Dietary Approaches to Stop Hypertension] diet).  Limit sugary drinks and sweets.  Limit saturated and trans fat to 5% to 6% of calories.  Limit sodium intake to 2,400 mg daily (about one teaspoon table salt [kosher/sea salt have less sodium per teaspoon]).    Exercise:   Engage in moderate-to-vigorous aerobic activity for at least 40 minutes (on average) three to four times each week.    Weight loss / Calorie Counting Apps:    Lose It!   [employer] Pal   Works great when you try it with a partner/ friend    Wearables:   Activity tracker   Step tracker     Skin Care:  Practice Safe Sun  Use sun screen SPF >30 daily  Protect your eyes with sunglasses   Dermatologist for skin check regularly    Bone Health:   https://www.nof.org/patients/treatment/[email or web address]    Substance Abuse:   Discussed cessation/primary prevention of tobacco, alcohol, or other drug use; driving or other dangerous activities under the influence; availability of treatment for abuse.     Sexuality:   Discussed sexually transmitted diseases, partner selection, use of condoms, avoidance of unintended pregnancy, sexuality  and contraceptive alternatives.     Injury prevention:   Discussed safety belts, safety helmets, smoke detector, smoking near bedding or upholstery.     Dental health:   Discussed importance of regular tooth brushing, flossing, and dental visits.    Colon Cancer screen (discussed benefits of screening colonoscopy)  Should usually start at 45 yoa.     Immunizations reviewed  Recommend Shingles vaccine at age 50 (Shingrix which is 2 doses)     Living Will  https://ag.[place].gov/publications/AG%20Publications/livingwillpacket.pdf       Clinical References    Preventive Care 40-64 Years Old, Female  Preventive care refers to lifestyle choices  and visits with your health care provider that can promote health and wellness. Preventive care visits are also called wellness exams.  What can I expect for my preventive care visit?  Counseling  Your health care provider may ask you questions about your:  Medical history, including:  Past medical problems.  Family medical history.  Pregnancy history.  Current health, including:  Menstrual cycle.  Method of birth control.  Emotional well-being.  Home life and relationship well-being.  Sexual activity and sexual health.  Lifestyle, including:  Alcohol, nicotine or tobacco, and drug use.  Access to firearms.  Diet, exercise, and sleep habits.  Work and work environment.  Sunscreen use.  Safety issues such as seatbelt and bike helmet use.  Physical exam  Your health care provider will check your:  Height and weight. These may be used to calculate your BMI (body mass index). BMI is a measurement that tells if you are at a healthy weight.  Waist circumference. This measures the distance around your waistline. This measurement also tells if you are at a healthy weight and may help predict your risk of certain diseases, such as type 2 diabetes and high blood pressure.  Heart rate and blood pressure.  Body temperature.  Skin for abnormal spots.  What immunizations do I need?    Vaccines are usually given at various ages, according to a schedule. Your health care provider will recommend vaccines for you based on your age, medical history, and lifestyle or other factors, such as travel or where you work.  What tests do I need?  Screening  Your health care provider may recommend screening tests for certain conditions. This may include:  Lipid and cholesterol levels.  Diabetes screening. This is done by checking your blood sugar (glucose) after you have not eaten for a while (fasting).  Pelvic exam and Pap test.  Hepatitis B test.  Hepatitis C test.  HIV (human immunodeficiency virus) test.  STI (sexually transmitted  infection) testing, if you are at risk.  Lung cancer screening.  Colorectal cancer screening.  Mammogram. Talk with your health care provider about when you should start having regular mammograms. This may depend on whether you have a family history of breast cancer.  BRCA-related cancer screening. This may be done if you have a family history of breast, ovarian, tubal, or peritoneal cancers.  Bone density scan. This is done to screen for osteoporosis.  Talk with your health care provider about your test results, treatment options, and if necessary, the need for more tests.  Follow these instructions at home:  Eating and drinking    Eat a diet that includes fresh fruits and vegetables, whole grains, lean protein, and low-fat dairy products.  Take vitamin and mineral supplements as recommended by your health care provider.  Do not drink alcohol if:  Your health care provider tells you not to drink.  You are pregnant, may be pregnant, or are planning to become pregnant.  If you drink alcohol:  Limit how much you have to 0-1 drink a day.  Know how much alcohol is in your drink. In the [place] one drink equals one 12 oz bottle of beer (355 mL), one 5 oz glass of wine (148 mL), or one 1½ oz glass of hard liquor (44 mL).  Lifestyle  Brush your teeth every morning and night with fluoride toothpaste. Floss one time each day.  Exercise for at least 30 minutes 5 or more days each week.  Do not use any products that contain nicotine or tobacco. These products include cigarettes, chewing tobacco, and vaping devices, such as e-cigarettes. If you need help quitting, ask your health care provider.  Do not use drugs.  If you are sexually active, practice safe sex. Use a condom or other form of protection to prevent STIs.  If you do not wish to become pregnant, use a form of birth control. If you plan to become pregnant, see your health care provider for a prepregnancy visit.  Take aspirin only as told by your health care provider.  Make sure that you understand how much to take and what form to take. Work with your health care provider to find out whether it is safe and beneficial for you to take aspirin daily.  Find healthy ways to manage stress, such as:  Meditation, yoga, or listening to music.  Journaling.  Talking to a trusted person.  Spending time with friends and family.  Minimize exposure to UV radiation to reduce your risk of skin cancer.  Safety  Always wear your seat belt while driving or riding in a vehicle.  Do not drive:  If you have been drinking alcohol. Do not ride with someone who has been drinking.  When you are tired or distracted.  While texting.  If you have been using any mind-altering substances or drugs.  Wear a helmet and other protective equipment during sports activities.  If you have firearms in your house, make sure you follow all gun safety procedures.  Seek help if you have been physically or sexually abused.  What's next?  Visit your health care provider once a year for an annual wellness visit.  Ask your health care provider how often you should have your eyes and teeth checked.  Stay up to date on all vaccines.  This information is not intended to replace advice given to you by your health care provider. Make sure you discuss any questions you have with your health care provider.  Document Revised: 06/15/2022 Document Reviewed: 06/15/2022  [employer] Patient Education © 2024 [employer]     Healthy Eating, Adult  Healthy eating may help you get and keep a healthy body weight, reduce the risk of chronic disease, and live a long and productive life. It is important to follow a healthy eating pattern. Your nutritional and calorie needs should be met mainly by different nutrient-rich foods.  What are tips for following this plan?  Reading food labels  Read labels and choose the following:  Reduced or low sodium products.  Juices with 100% fruit juice.  Foods with low saturated fats (<3 g per serving) and high  "polyunsaturated and monounsaturated fats.  Foods with whole grains, such as whole wheat, cracked wheat, brown rice, and wild rice.  Whole grains that are fortified with folic acid. This is recommended for females who are pregnant or who want to become pregnant.  Read labels and do not eat or drink the following:  Foods or drinks with added sugars. These include foods that contain brown sugar, corn sweetener, corn syrup, dextrose, fructose, glucose, high-fructose corn syrup, honey, invert sugar, lactose, malt syrup, maltose, molasses, raw sugar, sucrose, trehalose, or turbinado sugar.  Limit your intake of added sugars to less than 10% of your total daily calories. Do not eat more than the following amounts of added sugar per day:  6 teaspoons (25 g) for females.  9 teaspoons (38 g) for males.  Foods that contain processed or refined starches and grains.  Refined grain products, such as white flour, degermed cornmeal, white bread, and white rice.  Shopping  Choose nutrient-rich snacks, such as vegetables, whole fruits, and nuts. Avoid high-calorie and high-sugar snacks, such as potato chips, fruit snacks, and candy.  Use oil-based dressings and spreads on foods instead of solid fats such as butter, margarine, sour cream, or cream cheese.  Limit pre-made sauces, mixes, and \"instant\" products such as flavored rice, instant noodles, and ready-made pasta.  Try more plant-protein sources, such as tofu, tempeh, black beans, edamame, lentils, nuts, and seeds.  Explore eating plans such as the Mediterranean diet or vegetarian diet.  Try heart-healthy dips made with beans and healthy fats like hummus and guacamole. Vegetables go great with these.  Cooking  Use oil to sauté or stir-swenson foods instead of solid fats such as butter, margarine, or lard.  Try baking, boiling, grilling, or broiling instead of frying.  Remove the fatty part of meats before cooking.  Steam vegetables in water or broth.  Meal planning    At meals, " imagine dividing your plate into fourths:  One-half of your plate is fruits and vegetables.  One-fourth of your plate is whole grains.  One-fourth of your plate is protein, especially lean meats, poultry, eggs, tofu, beans, or nuts.  Include low-fat dairy as part of your daily diet.  Lifestyle  Choose healthy options in all settings, including home, work, school, restaurants, or stores.  Prepare your food safely:  Wash your hands after handling raw meats.  Where you prepare food, keep surfaces clean by regularly washing with hot, soapy water.  Keep raw meats separate from ready-to-eat foods, such as fruits and vegetables.  , meat, poultry, and eggs to the recommended temperature. Get a food thermometer.  Store foods at safe temperatures. In general:  Keep cold foods at 40°F (4.4°C) or below.  Keep hot foods at 140°F (60°C) or above.  Keep your freezer at 0°F (-17.8°C) or below.  Foods are not safe to eat if they have been between the temperatures of °F (4.4-60°C) for more than 2 hours.  What foods should I eat?  Fruits  Aim to eat 1½-2½ cups of fresh, canned (in natural juice), or frozen fruits each day. One cup of fruit equals 1 small apple, 1 large banana, 8 large strawberries, 1 cup (237 g) canned fruit, ½ cup (82 g) dried fruit, or 1 cup (240 mL) 100% juice.  Vegetables  Aim to eat 2-4 cups of fresh and frozen vegetables each day, including different varieties and colors. One cup of vegetables equals 1 cup (91 g) broccoli or cauliflower florets, 2 medium carrots, 2 cups (150 g) raw, leafy greens, 1 large tomato, 1 large hooks pepper, 1 large sweet potato, or 1 medium white potato.  Grains  Aim to eat 5-10 ounce-equivalents of whole grains each day. Examples of 1 ounce-equivalent of grains include 1 slice of bread, 1 cup (40 g) ready-to-eat cereal, 3 cups (24 g) popcorn, or ½ cup (93 g) cooked rice.  Meats and other proteins  Try to eat 5-7 ounce-equivalents of protein each day. Examples of 1  ounce-equivalent of protein include 1 egg, ½ oz nuts (12 almonds, 24 pistachios, or 7 walnut halves), 1/4 cup (90 g) cooked beans, 6 tablespoons (90 g) hummus or 1 tablespoon (16 g) peanut butter. A cut of meat or fish that is the size of a deck of cards is about 3-4 ounce-equivalents (85 g).  Of the protein you eat each week, try to have at least 8 sounce (227 g) of seafood. This is about 2 servings per week. This includes salmon, trout, herring, sardines, and anchovies.  Dairy  Aim to eat 3 cup-equivalents of fat-free or low-fat dairy each day. Examples of 1 cup-equivalent of dairy include 1 cup (240 mL) milk, 8 ounces (250 g) yogurt, 1½ ounces (44 g) natural cheese, or 1 cup (240 mL) fortified soy milk.  Fats and oils  Aim for about 5 teaspoons (21 g) of fats and oils per day. Choose monounsaturated fats, such as canola and olive oils, mayonnaise made with olive oil or avocado oil, avocados, peanut butter, and most nuts, or polyunsaturated fats, such as sunflower, corn, and soybean oils, walnuts, pine nuts, sesame seeds, sunflower seeds, and flaxseed.  Beverages  Aim for 6 eight-ounce glasses of water per day. Limit coffee to 3-5 eight-ounce cups per day.  Limit caffeinated beverages that have added calories, such as soda and energy drinks.  If you drink alcohol:  Limit how much you have to:  0-1 drink a day if you are female.  0-2 drinks a day if you are male.  Know how much alcohol is in your drink. In the [place] one drink is one 12 oz bottle of beer (355 mL), one 5 oz glass of wine (148 mL), or one 1½ oz glass of hard liquor (44 mL).  Seasoning and other foods  Try not to add too much salt to your food. Try using herbs and spices instead of salt.  Try not to add sugar to food.  This information is based on [place] nutrition guidelines. To learn more, visit choosemyplate.gov. Exact amounts may vary. You may need different amounts.  This information is not intended to replace advice given to you by your health care  provider. Make sure you discuss any questions you have with your health care provider.  Document Revised: 09/18/2023 Document Reviewed: 09/18/2023  [employer] Patient Education © 2024 [employer]     Exercising to Stay Healthy  To become healthy and stay healthy, it is recommended that you do moderate-intensity and vigorous-intensity exercise. You can tell that you are exercising at a moderate intensity if your heart starts beating faster and you start breathing faster but can still hold a conversation. You can tell that you are exercising at a vigorous intensity if you are breathing much harder and faster and cannot hold a conversation while exercising.  How can exercise benefit me?  Exercising regularly is important. It has many health benefits, such as:  Improving overall fitness, flexibility, and endurance.  Increasing bone density.  Helping with weight control.  Decreasing body fat.  Increasing muscle strength and endurance.  Reducing stress and tension, anxiety, depression, or anger.  Improving overall health.  What guidelines should I follow while exercising?  Before you start a new exercise program, talk with your health care provider.  Do not exercise so much that you hurt yourself, feel dizzy, or get very short of breath.  Wear comfortable clothes and wear shoes with good support.  Drink plenty of water while you exercise to prevent dehydration or heat stroke.  Work out until your breathing and your heartbeat get faster (moderate intensity).  How often should I exercise?  Choose an activity that you enjoy, and set realistic goals. Your health care provider can help you make an activity plan that is individually designed and works best for you.  Exercise regularly as told by your health care provider. This may include:  Doing strength training two times a week, such as:  Lifting weights.  Using resistance bands.  Push-ups.  Sit-ups.  Yoga.  Doing a certain intensity of exercise for a given amount of time.  Choose from these options:  A total of 150 minutes of moderate-intensity exercise every week.  A total of 75 minutes of vigorous-intensity exercise every week.  A mix of moderate-intensity and vigorous-intensity exercise every week.  Children, pregnant women, people who have not exercised regularly, people who are overweight, and older adults may need to talk with a health care provider about what activities are safe to perform. If you have a medical condition, be sure to talk with your health care provider before you start a new exercise program.  What are some exercise ideas?    Moderate-intensity exercise ideas include:  Walking 1 mile (1.6 km) in about 15 minutes.  Biking.  Hiking.  Golfing.  Dancing.  Water aerobics.  Vigorous-intensity exercise ideas include:  Walking 4.5 miles (7.2 km) or more in about 1 hour.  Jogging or running 5 miles (8 km) in about 1 hour.  Biking 10 miles (16.1 km) or more in about 1 hour.  Lap swimming.  Roller-skating or in-line skating.  Cross-country skiing.  Vigorous competitive sports, such as football, basketball, and soccer.  Jumping rope.  Aerobic dancing.  What are some everyday activities that can help me get exercise?  Yard work, such as:  Pushing a .  Raking and bagging leaves.  Washing your car.  Pushing a stroller.  Shoveling snow.  Gardening.  Washing windows or floors.  How can I be more active in my day-to-day activities?  Use stairs instead of an elevator.  Take a walk during your lunch break.  If you drive, park your car farther away from your work or school.  If you take public transportation, get off one stop early and walk the rest of the way.  Stand up or walk around during all of your indoor phone calls.  Get up, stretch, and walk around every 30 minutes throughout the day.  Enjoy exercise with a friend. Support to continue exercising will help you keep a regular routine of activity.  Where to find more information  You can find more information about  exercising to stay healthy from:  [place] Department of Health and Human Services: www.hhs.gov  Centers for Disease Control and Prevention (CDC): www.cdc.gov  Summary  Exercising regularly is important. It will improve your overall fitness, flexibility, and endurance.  Regular exercise will also improve your overall health. It can help you control your weight, reduce stress, and improve your bone density.  Do not exercise so much that you hurt yourself, feel dizzy, or get very short of breath.  Before you start a new exercise program, talk with your health care provider.  This information is not intended to replace advice given to you by your health care provider. Make sure you discuss any questions you have with your health care provider.  Document Revised: 04/15/2022 Document Reviewed: 04/15/2022  [employer] Patient Education © 2024 [employer]

## 2024-09-27 NOTE — PROGRESS NOTES
Chief Complaint  Annual Exam and Shoulder Pain (Rt x1 month - would like referral to surgeon )    Subjective        John Monk presents to [facility]  History of Present Illness     John is here for coordination of medical care, to discuss health maintenance, disease prevention as well as to followup on medical problems. Ms Monk reports having swelling of bilateral posterior cervical lymph nodes since June. She states she had COVID at that time and her neck has been swollen since. She denies pain, difficulty swallowing, fever or chills. She has spoken to her family who are also doctors and they suggested that she get referral to general surgery for a consultation. She is taking Weygovy and had severe constipation. She has recently stopped Weygovy and will have to restart on smaller dose when she sees her endocrinologist. She recently stopped taking her Wellbutrin due to constipation. She states she has taken her sisters prozac and it worked well for her.     Past Medical History:   Diagnosis Date    Essential hypertension     Hyperlipidemia     Hypothyroidism     Nephrolithiasis     Osteoarthritis     Polycystic ovarian syndrome      Family History   Problem Relation Age of Onset    Heart disease Mother     Hypertension Mother     Thyroid disease Mother     Heart disease Father     Nephrolithiasis Father     COPD Father     Thyroid disease Sister     Thyroid disease Brother     Thyroid disease Daughter     Malig Hyperthermia Neg Hx      Social History     Tobacco Use   Smoking Status Never   Smokeless Tobacco Never     Social History     Substance and Sexual Activity   Alcohol Use No       Patient Care Team:  Mariella Gonsalez APRN as PCP - General (Family Medicine)  Laine Winn MD as Consulting Physician (Hematology and Oncology)  Mariella Gonsalez APRN as Referring Physician (Family Medicine)     Social:  Marital status: . She feels safe at home. Lives with  daughter  Employment: full time.  Patient rates her stress level as: moderate. Elderly parents are getting more ill and stressing her.   Dental health: good. Brushes teeth 2 times a day, flosses 1 time a day . Dental visits: 2 times a year .  Vision correction: glasses lasix 2016  Hearing: normal.  Activity level is minimal.   Exercises 0 per week.     Weight trend is     Health and Weight:   Weight trend is   Wt Readings from Last 4 Encounters:   10/09/24 114 kg (251 lb 14.4 oz)   09/27/24 113 kg (248 lb 9.6 oz)   05/20/24 111 kg (244 lb 9.6 oz)   04/16/24 115 kg (254 lb)          Mental Health Check:   Depression screen: PHQ-2 Total Score: 0     Health Maintenance Female:  GYN:   Last gynecology appointment: 02/26/21 - due for PAP  Patient's last mammogram was 10/24/23  Last Completed Mammogram            MAMMOGRAM (Every 2 Years) Next due on 10/24/2025      10/24/2023  Mammo Screening Digital Tomosynthesis Bilateral With CAD    04/08/2021  Mammo Screening Digital Tomosynthesis Bilateral With CAD    11/07/2019  Mammo Screening Bilateral With CAD    10/08/2018  Mammo Screening Bilateral With CAD    06/27/2017  Mammo Screening Bilateral With CAD    Only the first 5 history entries have been loaded, but more history exists.                   Advised routine self-breast exams monthly.    STI Screen:   [] HIV     [] Syphilis   [] Chlamydia/ Gonorrhea   [x] Declines STD screen    Colon cancer screen:     She has no change in bowel habits.   Patient's last colonoscopy was 01/18/22.   Last Completed Colonoscopy            COLORECTAL CANCER SCREENING (COLONOSCOPY - Every 10 Years) Next due on 1/18/2032 01/18/2022  COLONOSCOPY    01/18/2022  Surgical Procedure: COLONOSCOPY                   She was advised to repeat in 10 years.    Vaccines:   Vaccines Due:   [] Prevnar 20     [x] Flu  [x] Shingrix (shingles: series of 2)   [] TDap  [x] COVID (booster)     [] Declines vaccines     Advised regular sunscreen.      Her  "cardiovascular risks are:     The 10-year ASCVD risk score (Lele GARCIA, et al., 2019) is: 4.4%    Values used to calculate the score:      Age: 59 years      Sex: Female      Is Non- : No      Diabetic: No      Tobacco smoker: No      Systolic Blood Pressure: 143 mmHg      Is BP treated: Yes      HDL Cholesterol: 64 mg/dL      Total Cholesterol: 250 mg/dL    [] No Known risk factors    [x] Hypertension   [x] Hyperlipidemia  [] Diabetes    [x] Obesity  [] Family history   [] Current or hx tobacco use  [x] Sedentary lifestyle   [] Post-menopausal     Objective   Vital Signs:  /90 (BP Location: Left arm, Patient Position: Sitting, Cuff Size: Adult)   Pulse 110   Temp 97.1 °F (36.2 °C) (Temporal)   Ht 170.2 cm (67.01\")   Wt 113 kg (248 lb 9.6 oz)   SpO2 98%   BMI 38.93 kg/m²   Estimated body mass index is 38.93 kg/m² as calculated from the following:    Height as of this encounter: 170.2 cm (67.01\").    Weight as of this encounter: 113 kg (248 lb 9.6 oz).            Physical Exam  Vitals reviewed.   Constitutional:       General: She is awake. She is not in acute distress.     Appearance: Normal appearance. She is overweight. She is not ill-appearing, toxic-appearing or diaphoretic.   HENT:      Head: Normocephalic.      Jaw: No tenderness, swelling or pain on movement.      Right Ear: Hearing, tympanic membrane, ear canal and external ear normal.      Left Ear: Hearing, tympanic membrane, ear canal and external ear normal.      Nose: Nose normal. No congestion or rhinorrhea.      Mouth/Throat:      Lips: Pink.      Mouth: Mucous membranes are moist.   Eyes:      General: Lids are normal. Vision grossly intact. No visual field deficit.        Right eye: No discharge.         Left eye: No discharge.      Extraocular Movements: Extraocular movements intact.      Conjunctiva/sclera: Conjunctivae normal.      Pupils: Pupils are equal, round, and reactive to light.   Neck:      Thyroid: No " thyroid mass or thyroid tenderness.        Comments: Swelling bilateral visible, no warm, erythema, tenderness noted with palpation. No difficulty swallowing or airway obstruction.  Cardiovascular:      Rate and Rhythm: Normal rate and regular rhythm.      Pulses: Normal pulses.      Heart sounds: Normal heart sounds, S1 normal and S2 normal. No murmur heard.     No friction rub. No gallop.   Pulmonary:      Effort: Pulmonary effort is normal. No respiratory distress.      Breath sounds: Normal breath sounds. No stridor, decreased air movement or transmitted upper airway sounds. No decreased breath sounds, wheezing, rhonchi or rales.   Chest:      Chest wall: No tenderness.   Abdominal:      General: Abdomen is protuberant. Bowel sounds are normal. There is no distension or abdominal bruit. There are no signs of injury.      Palpations: Abdomen is soft. There is no mass.      Tenderness: There is no abdominal tenderness. There is no guarding or rebound.      Hernia: No hernia is present.   Musculoskeletal:         General: No swelling, tenderness, deformity or signs of injury. Normal range of motion.      Cervical back: Normal range of motion. Swelling and edema present. No deformity, erythema, signs of trauma, lacerations, rigidity, spasms, torticollis, tenderness, bony tenderness or crepitus. No pain with movement, spinous process tenderness or muscular tenderness. Normal range of motion.      Thoracic back: Normal.      Lumbar back: Normal.      Right lower leg: No edema.      Left lower leg: No edema.   Lymphadenopathy:      Head:      Right side of head: No submental, submandibular, tonsillar, preauricular, posterior auricular or occipital adenopathy.      Left side of head: No submental, submandibular, tonsillar, preauricular, posterior auricular or occipital adenopathy.      Cervical: No cervical adenopathy.      Right cervical: Superficial cervical adenopathy, deep cervical adenopathy and posterior cervical  adenopathy present.      Left cervical: Superficial cervical adenopathy, deep cervical adenopathy and posterior cervical adenopathy present.      Upper Body:      Right upper body: No supraclavicular, axillary, pectoral or epitrochlear adenopathy.      Left upper body: No supraclavicular, axillary, pectoral or epitrochlear adenopathy.      Lower Body: No right inguinal adenopathy. No left inguinal adenopathy.   Skin:     General: Skin is warm and dry.      Capillary Refill: Capillary refill takes less than 2 seconds.      Coloration: Skin is not jaundiced or pale.      Findings: No bruising, erythema, lesion or rash. Rash is not crusting, macular, nodular, papular, purpuric, pustular, scaling, urticarial or vesicular.      Nails: There is no clubbing.   Neurological:      General: No focal deficit present.      Mental Status: She is alert and oriented to person, place, and time. Mental status is at baseline.      Cranial Nerves: No cranial nerve deficit, dysarthria or facial asymmetry.      Sensory: Sensation is intact.      Motor: Motor function is intact. No weakness, tremor, atrophy, abnormal muscle tone, seizure activity or pronator drift.      Coordination: Coordination is intact.      Gait: Gait is intact. Gait normal.   Psychiatric:         Attention and Perception: Attention and perception normal.         Mood and Affect: Mood and affect normal.         Speech: Speech normal.         Behavior: Behavior normal. Behavior is cooperative.         Thought Content: Thought content normal.         Cognition and Memory: Cognition and memory normal.         Judgment: Judgment normal.        Result Review :  The following data was reviewed by: LUISITO Trinidad on 09/27/2024:  CMP          5/20/2024    09:33 9/27/2024    16:06   CMP   Glucose 91  82    BUN 16  15    Creatinine 0.83  0.94    EGFR 82.9     Sodium 140  137    Potassium 4.3  4.5    Chloride 104  100    Calcium 9.6  10.1    Total Protein  6.8    Total  Protein 6.8     Albumin 4.3  4.6    Globulin  2.2    Globulin 2.5     Total Bilirubin 0.3  0.6    Alkaline Phosphatase 90  93    AST (SGOT) 16  14    ALT (SGPT) 15  19    Albumin/Globulin Ratio 1.7     BUN/Creatinine Ratio 19.3  16.0    Anion Gap 12.8       CBC          5/20/2024    09:33 9/27/2024    16:06 10/9/2024    10:21   CBC   WBC 9.01  9.51  8.90    RBC 4.67  4.56  4.56    Hemoglobin 12.9  12.8  12.4    Hematocrit 38.9  37.9  39.3    MCV 83.3  83.1  86.2    MCH 27.6  28.1  27.2    MCHC 33.2  33.8  31.6    RDW 12.7  13.7  13.4    Platelets 377  373  371      CBC w/diff          5/20/2024    09:33 9/27/2024    16:06 10/9/2024    10:21   CBC w/Diff   WBC 9.01  9.51  8.90    RBC 4.67  4.56  4.56    Hemoglobin 12.9  12.8  12.4    Hematocrit 38.9  37.9  39.3    MCV 83.3  83.1  86.2    MCH 27.6  28.1  27.2    MCHC 33.2  33.8  31.6    RDW 12.7  13.7  13.4    Platelets 377  373  371    Neutrophil Rel %  59.7  61.5    Immature Granulocyte Rel %   0.4    Lymphocyte Rel %  32.6  30.8    Monocyte Rel %  6.1  5.8    Eosinophil Rel %  0.8  1.1    Basophil Rel %  0.4  0.4      Lipid Panel          11/16/2023    10:13 5/20/2024    09:33 9/27/2024    16:06   Lipid Panel   Total Cholesterol  195     Total Cholesterol 227   250    Triglycerides 171  210  140    HDL Cholesterol 56  51  64    VLDL Cholesterol 30  36  25    LDL Cholesterol  141  108  161      TSH          11/16/2023    10:14 9/27/2024    16:06   TSH   TSH 0.358  6.360                Assessment and Plan   Diagnoses and all orders for this visit:    1. Annual physical exam (Primary)    2. Routine health maintenance  -     Hemoglobin A1c  -     Ambulatory Referral to General Surgery  -     Ambulatory Referral to Gynecology    3. Hyperlipidemia, unspecified hyperlipidemia type  -     Hemoglobin A1c  -     Lipid Panel    4. Hypothyroidism, adult  -     TSH Rfx On Abnormal To Free T4    5. Essential hypertension  -     CBC & Differential  -     Comprehensive Metabolic  Panel  -     Hemoglobin A1c  -     Microalbumin / Creatinine Urine Ratio - Urine, Clean Catch    6. Vitamin B 12 deficiency  -     Vitamin B12    7. Vitamin D deficiency  -     Vitamin D 25 hydroxy    8. Need for vaccination  -     COVID-19 (Pfizer) 12yrs+ (COMIRNATY)    9. Need for influenza vaccination  -     Fluzone >6mos    10. Mass of neck  -     US Head Neck Soft Tissue; Future    11. Cervical lymphadenopathy  -     Ambulatory Referral to General Surgery    12. Prediabetes    Other orders  -     FLUoxetine (PROzac) 20 MG capsule; Take 1 capsule by mouth Daily.  Dispense: 90 capsule; Refill: 0  -     T4F  -     vitamin D (ERGOCALCIFEROL) 1.25 MG (92988 UT) capsule capsule; Take 1 capsule by mouth 1 (One) Time Per Week.  Dispense: 12 capsule; Refill: 0  -     Cyanocobalamin 1000 MCG/ML kit; Inject 1 mL as directed Every 30 (Thirty) Days. Takes once monthly  Dispense: 1 kit; Refill: 0      Patient Counseling:    Diet:    Eat vegetables, fruits, whole grain, low-fat dairy, poultry, fish, beans, nontropical vegetable oils, and nuts, but avoid red meat (i.e., Mediterranean-style diet, DASH [Dietary Approaches to Stop Hypertension] diet).  Limit sugary drinks and sweets.  Limit saturated and trans fat to 5% to 6% of calories.  Limit sodium intake to 2,400 mg daily (about one teaspoon table salt [kosher/sea salt have less sodium per teaspoon]).    Exercise:   Engage in moderate-to-vigorous aerobic activity for at least 40 minutes (on average) three to four times each week.    Weight loss / Calorie Counting Apps:    Lose It!   MyFitness Pal   Works great when you try it with a partner/ friend    Wearables:   Activity tracker   Step tracker     Skin Care:  Practice Safe Sun  Use sun screen SPF >30 daily  Protect your eyes with sunglasses   Dermatologist for skin check regularly    Bone Health:   https://www.nof.org/patients/treatment/nutrition/    Substance Abuse:   Discussed cessation/primary prevention of tobacco,  alcohol, or other drug use; driving or other dangerous activities under the influence; availability of treatment for abuse.     Sexuality:   Discussed sexually transmitted diseases, partner selection, use of condoms, avoidance of unintended pregnancy, sexuality  and contraceptive alternatives.     Injury prevention:   Discussed safety belts, safety helmets, smoke detector, smoking near bedding or upholstery.     Dental health:   Discussed importance of regular tooth brushing, flossing, and dental visits.    Colon Cancer screen (discussed benefits of screening colonoscopy)  Should usually start at 45 yoa.     Immunizations reviewed  Recommend Shingles vaccine at age 50 (Shingrix which is 2 doses)     Living Will  https://ag.[place].gov/publications/AG%20Publications/livingwillpacket.pdf    Per CDC recommendations each week the average adult needs 150 minutes (30 min workouts 3-5 times a week) of moderate - intensity physical activity and 2 days of muscle strengthening.     Please complete your lab work today. Following review of results our office will be in contact with you for follow up care, medication changes or further instructions if needed. At that time if we need to schedule a follow up appointment one will be made at the time of the call.    Thank you for allowing us to care for you,  LUISITO Trinidad           Follow Up   Return in about 6 months (around 3/27/2025) for Recheck routine health maintenece.  Patient was given instructions and counseling regarding her condition or for health maintenance advice. Please see specific information pulled into the AVS if appropriate.

## 2024-09-28 LAB
25(OH)D3+25(OH)D2 SERPL-MCNC: 27.2 NG/ML (ref 30–100)
ALBUMIN SERPL-MCNC: 4.6 G/DL (ref 3.5–5.2)
ALBUMIN/CREAT UR: 4 MG/G CREAT (ref 0–29)
ALBUMIN/GLOB SERPL: 2.1 G/DL
ALP SERPL-CCNC: 93 U/L (ref 39–117)
ALT SERPL-CCNC: 19 U/L (ref 1–33)
AST SERPL-CCNC: 14 U/L (ref 1–32)
BASOPHILS # BLD AUTO: 0.04 10*3/MM3 (ref 0–0.2)
BASOPHILS NFR BLD AUTO: 0.4 % (ref 0–1.5)
BILIRUB SERPL-MCNC: 0.6 MG/DL (ref 0–1.2)
BUN SERPL-MCNC: 15 MG/DL (ref 6–20)
BUN/CREAT SERPL: 16 (ref 7–25)
CALCIUM SERPL-MCNC: 10.1 MG/DL (ref 8.6–10.5)
CHLORIDE SERPL-SCNC: 100 MMOL/L (ref 98–107)
CHOLEST SERPL-MCNC: 250 MG/DL (ref 0–200)
CO2 SERPL-SCNC: 27 MMOL/L (ref 22–29)
CREAT SERPL-MCNC: 0.94 MG/DL (ref 0.57–1)
CREAT UR-MCNC: 314.5 MG/DL
EGFRCR SERPLBLD CKD-EPI 2021: 70.9 ML/MIN/1.73
EOSINOPHIL # BLD AUTO: 0.08 10*3/MM3 (ref 0–0.4)
EOSINOPHIL NFR BLD AUTO: 0.8 % (ref 0.3–6.2)
ERYTHROCYTE [DISTWIDTH] IN BLOOD BY AUTOMATED COUNT: 13.7 % (ref 12.3–15.4)
GLOBULIN SER CALC-MCNC: 2.2 GM/DL
GLUCOSE SERPL-MCNC: 82 MG/DL (ref 65–99)
HBA1C MFR BLD: 5.6 % (ref 4.8–5.6)
HCT VFR BLD AUTO: 37.9 % (ref 34–46.6)
HDLC SERPL-MCNC: 64 MG/DL (ref 40–60)
HGB BLD-MCNC: 12.8 G/DL (ref 12–15.9)
IMM GRANULOCYTES # BLD AUTO: 0.04 10*3/MM3 (ref 0–0.05)
IMM GRANULOCYTES NFR BLD AUTO: 0.4 % (ref 0–0.5)
LDLC SERPL CALC-MCNC: 161 MG/DL (ref 0–100)
LYMPHOCYTES # BLD AUTO: 3.1 10*3/MM3 (ref 0.7–3.1)
LYMPHOCYTES NFR BLD AUTO: 32.6 % (ref 19.6–45.3)
MCH RBC QN AUTO: 28.1 PG (ref 26.6–33)
MCHC RBC AUTO-ENTMCNC: 33.8 G/DL (ref 31.5–35.7)
MCV RBC AUTO: 83.1 FL (ref 79–97)
MICROALBUMIN UR-MCNC: 13.9 UG/ML
MONOCYTES # BLD AUTO: 0.58 10*3/MM3 (ref 0.1–0.9)
MONOCYTES NFR BLD AUTO: 6.1 % (ref 5–12)
NEUTROPHILS # BLD AUTO: 5.67 10*3/MM3 (ref 1.7–7)
NEUTROPHILS NFR BLD AUTO: 59.7 % (ref 42.7–76)
NRBC BLD AUTO-RTO: 0 /100 WBC (ref 0–0.2)
PLATELET # BLD AUTO: 373 10*3/MM3 (ref 140–450)
POTASSIUM SERPL-SCNC: 4.5 MMOL/L (ref 3.5–5.2)
PROT SERPL-MCNC: 6.8 G/DL (ref 6–8.5)
RBC # BLD AUTO: 4.56 10*6/MM3 (ref 3.77–5.28)
SODIUM SERPL-SCNC: 137 MMOL/L (ref 136–145)
T4 FREE SERPL-MCNC: 1.44 NG/DL (ref 0.93–1.7)
TRIGL SERPL-MCNC: 140 MG/DL (ref 0–150)
TSH SERPL DL<=0.005 MIU/L-ACNC: 6.36 UIU/ML (ref 0.27–4.2)
VIT B12 SERPL-MCNC: 325 PG/ML (ref 211–946)
VLDLC SERPL CALC-MCNC: 25 MG/DL (ref 5–40)
WBC # BLD AUTO: 9.51 10*3/MM3 (ref 3.4–10.8)

## 2024-10-01 RX ORDER — ERGOCALCIFEROL 1.25 MG/1
50000 CAPSULE, LIQUID FILLED ORAL WEEKLY
Qty: 12 CAPSULE | Refills: 0 | Status: SHIPPED | OUTPATIENT
Start: 2024-10-01

## 2024-10-01 NOTE — PROGRESS NOTES
Ms Monk,     I have reviewed your lab work. Your CBC is normal; no evidence of anemia or infection. Your CMP shows normal liver and kidney function. Your diabetic A1C screening shows no evidence of diabetes at this time. Your cholesterol is elevated. LDL goal is under 100. Triglyceride goal is under 150. I recommend following a lower saturated fat and lower refined carbohydrate diet.  Please work on dietary changes such as reducing added sugars, decreasing saturated fats, and increase foods with omega-3 fatty acids. Also, engaging in 30 minutes of exercise 5 times per week can improve your cholesterol.     Your thyroid numbers are elevated. Please follow up with Dr Hall office to verify if he would like to make medication changes and/or repeat lab work at your next visit on 11/18/24.       **You should contact [phone] to schedule your US if you have not heard from them in a week. Please let us know if you have any further questions or concerns.    Thank you for allowing us to care for you,  LUISITO Trinidad

## 2024-10-02 RX ORDER — CYANOCOBALAMIN 1000 UG/ML
INJECTION, SOLUTION INTRAMUSCULAR; SUBCUTANEOUS
Qty: 3 ML | Refills: 0 | OUTPATIENT
Start: 2024-10-02

## 2024-10-04 ENCOUNTER — HOSPITAL ENCOUNTER (OUTPATIENT)
Dept: ULTRASOUND IMAGING | Facility: HOSPITAL | Age: 59
Discharge: HOME | End: 2024-10-04
Payer: COMMERCIAL

## 2024-10-04 DIAGNOSIS — R22.1 MASS OF NECK: ICD-10-CM

## 2024-10-04 PROCEDURE — 76536 US EXAM OF HEAD AND NECK: CPT

## 2024-10-07 ENCOUNTER — DOCUMENTATION (OUTPATIENT)
Dept: INTERNAL MEDICINE | Facility: CLINIC | Age: 59
End: 2024-10-07
Payer: COMMERCIAL

## 2024-10-07 ENCOUNTER — TELEPHONE (OUTPATIENT)
Dept: INTERNAL MEDICINE | Facility: CLINIC | Age: 59
End: 2024-10-07
Payer: COMMERCIAL

## 2024-10-07 NOTE — PROGRESS NOTES
I have called and spoken with patient via telephone regarding ultrasound results.  She states her cousin is a internist and wants an MRI of the soft tissue.  I have explained to her in detail there is no concerns due to normal white count due to ultrasound imaging and no signs or symptoms of infection or anemias.  At this time we will continue to monitor.  I have informed her if her swelling increases, or changes in nature please call the office and at that time we will order an MRI.  Patient is agreeable to plan of care. I have explained in great detail post COVID can cause lymphedema.  We will continue to monitor.  Thank you for allowing us to care for you,  LUISITO Trinidad

## 2024-10-07 NOTE — TELEPHONE ENCOUNTER
----- Message from Mariella Gonsalez sent at 10/1/2024  4:48 PM EDT -----  Ms Monk,     I have reviewed your lab work. Your CBC is normal; no evidence of anemia or infection. Your CMP shows normal liver and kidney function. Your diabetic A1C screening shows no evidence of diabetes at this time. Your cholesterol is elevated. LDL goal is under 100. Triglyceride goal is under 150. I recommend following a lower saturated fat and lower refined carbohydrate diet.  Please work on dietary changes such as reducing added sugars, decreasing saturated fats, and increase foods with omega-3 fatty acids. Also, engaging in 30 minutes of exercise 5 times per week can improve your cholesterol.     Your thyroid numbers are elevated. Please follow up with Dr Hall office to verify if he would like to make medication changes and/or repeat lab work at your next visit on 11/18/24.       ##You should contact [phone] to schedule your US if you have not heard from them in a week. Please let us know if you have any further questions or concerns.    Thank you for allowing us to care for you,  LUISITO Trinidad

## 2024-10-07 NOTE — TELEPHONE ENCOUNTER
Called pt to see if pt has any questions or concerns as pt viewed [employer] labs and message from PCP - no questions at this time

## 2024-10-08 ENCOUNTER — TELEPHONE (OUTPATIENT)
Dept: INTERNAL MEDICINE | Facility: CLINIC | Age: 59
End: 2024-10-08
Payer: COMMERCIAL

## 2024-10-08 DIAGNOSIS — R59.9 SWOLLEN LYMPH NODES: Primary | ICD-10-CM

## 2024-10-08 NOTE — TELEPHONE ENCOUNTER
Patient called back and stated that an oncologist will accept her and is asking for a referral.     Mariella put in a referral. Referral entered and referral coordinator notified.     Micki please notify patient once sent

## 2024-10-08 NOTE — TELEPHONE ENCOUNTER
Caller: John Monk    Relationship: Self    Best call back number: [phone]     What is the best time to reach you: ANYTIME    Who are you requesting to speak with (clinical staff, provider,  specific staff member): CLINICAL    What was the call regarding: PATIENT IS WANTING AN UPDATE ON THE MESSAGE SHE SENT IN THIS AFTERNOON 10/08/24.     PLEASE CALL TO ADVISE.     Is it okay if the provider responds through [employer]: YES

## 2024-10-08 NOTE — TELEPHONE ENCOUNTER
Caller: John Monk    Relationship: Self    Best call back number:[phone]    Who are you requesting to speak with (clinical staff, provider,  specific staff member): CLINICAL    What was the call regarding: CHECKING STATUS OF MESSAGES SENT TO PROVIDER CONCERNING REFERRAL.     PLEASE ADVISE

## 2024-10-08 NOTE — TELEPHONE ENCOUNTER
Called pt and left voice mail informing her that referral was place and sent. It is marked as urgent and the office of Dr. Nunez will call pt ASAP.    Pt scheduled for 10/9/24.

## 2024-10-09 ENCOUNTER — CONSULT (OUTPATIENT)
Dept: ONCOLOGY | Facility: CLINIC | Age: 59
End: 2024-10-09
Payer: COMMERCIAL

## 2024-10-09 ENCOUNTER — LAB (OUTPATIENT)
Dept: OTHER | Facility: HOSPITAL | Age: 59
End: 2024-10-09
Payer: COMMERCIAL

## 2024-10-09 VITALS
BODY MASS INDEX: 39.54 KG/M2 | SYSTOLIC BLOOD PRESSURE: 143 MMHG | OXYGEN SATURATION: 96 % | HEART RATE: 87 BPM | DIASTOLIC BLOOD PRESSURE: 82 MMHG | WEIGHT: 251.9 LBS | HEIGHT: 67 IN | RESPIRATION RATE: 16 BRPM | TEMPERATURE: 98.2 F

## 2024-10-09 DIAGNOSIS — R22.1 NECK MASS: Primary | ICD-10-CM

## 2024-10-09 DIAGNOSIS — R61 NIGHT SWEATS: ICD-10-CM

## 2024-10-09 PROBLEM — F98.8 ADULT ATTENTION DEFICIT DISORDER: Status: RESOLVED | Noted: 2024-04-16 | Resolved: 2024-10-09

## 2024-10-09 LAB
B2 MICROGLOB SERPL-MCNC: 2.2 MG/L (ref 0.8–2.2)
BASOPHILS # BLD AUTO: 0.04 10*3/MM3 (ref 0–0.2)
BASOPHILS NFR BLD AUTO: 0.4 % (ref 0–1.5)
DEPRECATED RDW RBC AUTO: 42.5 FL (ref 37–54)
EOSINOPHIL # BLD AUTO: 0.1 10*3/MM3 (ref 0–0.4)
EOSINOPHIL NFR BLD AUTO: 1.1 % (ref 0.3–6.2)
ERYTHROCYTE [DISTWIDTH] IN BLOOD BY AUTOMATED COUNT: 13.4 % (ref 12.3–15.4)
HCT VFR BLD AUTO: 39.3 % (ref 34–46.6)
HGB BLD-MCNC: 12.4 G/DL (ref 12–15.9)
IMM GRANULOCYTES # BLD AUTO: 0.04 10*3/MM3 (ref 0–0.05)
IMM GRANULOCYTES NFR BLD AUTO: 0.4 % (ref 0–0.5)
LDH SERPL-CCNC: 197 U/L (ref 135–214)
LYMPHOCYTES # BLD AUTO: 2.74 10*3/MM3 (ref 0.7–3.1)
LYMPHOCYTES NFR BLD AUTO: 30.8 % (ref 19.6–45.3)
MCH RBC QN AUTO: 27.2 PG (ref 26.6–33)
MCHC RBC AUTO-ENTMCNC: 31.6 G/DL (ref 31.5–35.7)
MCV RBC AUTO: 86.2 FL (ref 79–97)
MONOCYTES # BLD AUTO: 0.52 10*3/MM3 (ref 0.1–0.9)
MONOCYTES NFR BLD AUTO: 5.8 % (ref 5–12)
NEUTROPHILS NFR BLD AUTO: 5.46 10*3/MM3 (ref 1.7–7)
NEUTROPHILS NFR BLD AUTO: 61.5 % (ref 42.7–76)
NRBC BLD AUTO-RTO: 0 /100 WBC (ref 0–0.2)
PLATELET # BLD AUTO: 371 10*3/MM3 (ref 140–450)
PMV BLD AUTO: 9.9 FL (ref 6–12)
RBC # BLD AUTO: 4.56 10*6/MM3 (ref 3.77–5.28)
WBC NRBC COR # BLD AUTO: 8.9 10*3/MM3 (ref 3.4–10.8)

## 2024-10-09 PROCEDURE — 86334 IMMUNOFIX E-PHORESIS SERUM: CPT | Performed by: INTERNAL MEDICINE

## 2024-10-09 PROCEDURE — 82784 ASSAY IGA/IGD/IGG/IGM EACH: CPT | Performed by: INTERNAL MEDICINE

## 2024-10-09 PROCEDURE — 84155 ASSAY OF PROTEIN SERUM: CPT | Performed by: INTERNAL MEDICINE

## 2024-10-09 PROCEDURE — 83521 IG LIGHT CHAINS FREE EACH: CPT | Performed by: INTERNAL MEDICINE

## 2024-10-09 PROCEDURE — 85025 COMPLETE CBC W/AUTO DIFF WBC: CPT | Performed by: INTERNAL MEDICINE

## 2024-10-09 PROCEDURE — 83615 LACTATE (LD) (LDH) ENZYME: CPT | Performed by: INTERNAL MEDICINE

## 2024-10-09 PROCEDURE — 84165 PROTEIN E-PHORESIS SERUM: CPT | Performed by: INTERNAL MEDICINE

## 2024-10-09 PROCEDURE — 36415 COLL VENOUS BLD VENIPUNCTURE: CPT

## 2024-10-09 PROCEDURE — 82232 ASSAY OF BETA-2 PROTEIN: CPT | Performed by: INTERNAL MEDICINE

## 2024-10-09 NOTE — PROGRESS NOTES
Subjective     REASON FOR CONSULTATION:  Provide an opinion on any further workup or treatment on:    Neck mass                       REQUESTING PHYSICIAN: Mariella Gonsalez APRN    RECORDS OBTAINED: Records of the patients history including those obtained from the referring provider were reviewed and summarized in detail.    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  She reports that she developed COVID in late 2024.  She had fatigue and sore throat.  She did not have high fever or shortness of breath at that time.  In mid 2024, she started noticing swelling of the neck, right more than left.  She did not feel a lump.  The swelling persisted and she became concerned about an underlying mass.  In addition, she started having night sweats.  Some of the episodes of night sweats were drenching.    She has hypothyroidism.  Most recent labs showed TSH to be elevated likely indicating that she is not adequately replaced.  She lost about 20 pounds on Wegovy.  However, she gained about 10 pounds back due to interruption in the treatment.    Due to persistence of the symptoms, she had an ultrasound done that revealed supraclavicular lymph nodes.  She was referred to our office for further evaluation.     REVIEW OF SYSTEMS:  Pertinent ROS as in the HPI.     Past Medical History:   Diagnosis Date    Essential hypertension     Hyperlipidemia     Hypothyroidism     Nephrolithiasis     Osteoarthritis     Polycystic ovarian syndrome      Past Surgical History:   Procedure Laterality Date    BREAST BIOPSY      OPEN    BREAST BIOPSY      RIGHT BREAST BIOPSY DURING BREAST SURGERY     SECTION      COLONOSCOPY N/A 2022    Procedure: COLONOSCOPY to cecum with cold bx polypectomy;  Surgeon: Dana Nathan MD;  Location: [facility] ENDOSCOPY;  Service: Gastroenterology;  Laterality: N/A;  pre: screening  post: hemorrhoids, polyps    GALLBLADDER  SURGERY      KNEE ARTHROSCOPY Bilateral 2016     Social History     Socioeconomic History    Marital status:    Tobacco Use    Smoking status: Never    Smokeless tobacco: Never   Vaping Use    Vaping status: Never Used   Substance and Sexual Activity    Alcohol use: No    Drug use: No    Sexual activity: Defer     Family History   Problem Relation Age of Onset    Heart disease Mother     Hypertension Mother     Thyroid disease Mother     Heart disease Father     Nephrolithiasis Father     COPD Father     Thyroid disease Sister     Thyroid disease Brother     Thyroid disease Daughter     Malig Hyperthermia Neg Hx       MEDICATIONS:    Current Outpatient Medications:     Cyanocobalamin 1000 MCG/ML kit, Inject 1 mL as directed Every 30 (Thirty) Days. Takes once monthly, Disp: 1 kit, Rfl: 0    FLUoxetine (PROzac) 20 MG capsule, Take 1 capsule by mouth Daily., Disp: 90 capsule, Rfl: 0    Gel-One 30 MG/3ML prefilled syringe injection, Inject 3 mL into the appropriate joint as directed by provider 1 (One) Time. yearly, Disp: , Rfl:     levothyroxine (SYNTHROID, LEVOTHROID) 137 MCG tablet, Take 1 tablet by mouth Every Morning., Disp: 90 tablet, Rfl: 3    olmesartan-hydrochlorothiazide (BENICAR HCT) 20-12.5 MG per tablet, Take 1 tablet by mouth Daily., Disp: 90 tablet, Rfl: 1    omeprazole (priLOSEC) 40 MG capsule, TAKE ONE CAPSULE BY MOUTH EVERY DAY AS NEEDED FOR ACID REFLUX, Disp: 30 capsule, Rfl: 0    vitamin D (ERGOCALCIFEROL) 1.25 MG (30519 UT) capsule capsule, Take 1 capsule by mouth 1 (One) Time Per Week., Disp: 12 capsule, Rfl: 0    Semaglutide-Weight Management (Wegovy) 1 MG/0.5ML solution auto-injector, Inject 0.5 mL under the skin into the appropriate area as directed 1 (One) Time Per Week. (Patient not taking: Reported on 9/27/2024), Disp: 2 mL, Rfl: 5     ALLERGIES:  No Known Allergies     Objective   VITAL SIGNS:  Vitals:    10/09/24 1033   BP: 143/82   Pulse: 87   Resp: 16   Temp: 98.2 °F (36.8 °C)  "  TempSrc: Oral   SpO2: 96%   Weight: 114 kg (251 lb 14.4 oz)   Height: 170.2 cm (67.01\")   PainSc: 0-No pain     Wt Readings from Last 3 Encounters:   10/09/24 114 kg (251 lb 14.4 oz)   09/27/24 113 kg (248 lb 9.6 oz)   05/20/24 111 kg (244 lb 9.6 oz)     PHYSICAL EXAMINATION  GENERAL:  The patient appears in good general condition, not in acute distress.  SKIN: No skin rashes. No ecchymosis.  HEAD:  Normocephalic.  EYES:  No Jaundice. No Pallor. Pupils equal. EOMI.  NECK:  Supple with Good ROM. No Thyromegaly. No Masses.  LYMPHATICS: Right supraclavicular lymph node measured about 1 cm, slightly tender. No definite lymphadenopathy in the left supraclavicular or in the axillary areas.  CHEST: Normal respiratory effort. Lungs clear to auscultation.   CARDIAC: No edema.  ABDOMEN:  Soft. No tenderness. No Hepatomegaly. No Splenomegaly. No masses.  EXTREMITIES:  No arthritic changes.   NEUROLOGICAL:  No Focal neurological deficits.     RESULT REVIEW:   Results from last 7 days   Lab Units 10/09/24  1021   WBC 10*3/mm3 8.90   NEUTROS ABS 10*3/mm3 5.46   HEMOGLOBIN g/dL 12.4   HEMATOCRIT % 39.3   PLATELETS 10*3/mm3 371     Component      Latest Ref Rng 10/9/2024   LDH      135 - 214 U/L 197      Component      Latest Ref Rng 10/9/2024   Beta-2 Microglobulin      0.8 - 2.2 mg/L 2.2      Ultrasound soft tissue neck on 10/4/2024:  The right supraclavicular region was imaged at the site of the area of  palpable concern. There is a tiny 5 mm to 6 mm benign-appearing lymph  node in this region but no other masses or abnormal fluid collections  are seen.     The left supraclavicular region was imaged. There is an approximately 5  mm probable lymph node as well. No other masses or abnormal fluid  collections are seen.     IMPRESSION:  1. Tiny probable 5 mm to 6 mm lymph node in the right and left  supraclavicular region as described.  2. No other masses or abnormal fluid collections are seen bilaterally.    Assessment & Plan "   *Neck mass/swelling.  *Night sweats.  Symptoms started in August 2024 when she noticed swelling in the supraclavicular areas, right greater than sign left.  He also started having drenching night sweats.  The symptoms are different from previous hot flashes.  Of note is that the patient had COVID 19 infection in late July 2024 but she did not have severe symptoms.    Exam today revealed a 1 cm lymph node in the right supraclavicular area.  No other definite lymphadenopathy.  Spleen was not palpable.  Ultrasound soft tissue neck on 10/4/2024 revealed lymph nodes in the bilateral supraclavicular areas, measuring 5-6 mm.    PLAN:    1.  Due to the persistence of the swelling in the neck and with the patient having drenching night sweats, I recommended obtaining soft tissue CT scan of the neck for further evaluation for lymphadenopathy or the presence of a mass.  This will be obtained within 1 week.  2.  Obtain SPEP MERRY [facility] to evaluate for the presence of a monoclonal protein.  3.  We will contact the patient with the result and arrange for follow-up accordingly.        Laine Winn MD  10/09/24

## 2024-10-10 ENCOUNTER — PATIENT ROUNDING (BHMG ONLY) (OUTPATIENT)
Dept: ONCOLOGY | Facility: CLINIC | Age: 59
End: 2024-10-10
Payer: COMMERCIAL

## 2024-10-10 LAB
ALBUMIN SERPL ELPH-MCNC: 3.7 G/DL (ref 2.9–4.4)
ALBUMIN/GLOB SERPL: 1.3 {RATIO} (ref 0.7–1.7)
ALPHA1 GLOB SERPL ELPH-MCNC: 0.2 G/DL (ref 0–0.4)
ALPHA2 GLOB SERPL ELPH-MCNC: 0.9 G/DL (ref 0.4–1)
B-GLOBULIN SERPL ELPH-MCNC: 1.2 G/DL (ref 0.7–1.3)
GAMMA GLOB SERPL ELPH-MCNC: 0.8 G/DL (ref 0.4–1.8)
GLOBULIN SER-MCNC: 3 G/DL (ref 2.2–3.9)
IGA SERPL-MCNC: 115 MG/DL (ref 87–352)
IGG SERPL-MCNC: 875 MG/DL (ref 586–1602)
IGM SERPL-MCNC: 40 MG/DL (ref 26–217)
INTERPRETATION SERPL IEP-IMP: NORMAL
KAPPA LC FREE SER-MCNC: 13.6 MG/L (ref 3.3–19.4)
KAPPA LC FREE/LAMBDA FREE SER: 1.37 {RATIO} (ref 0.26–1.65)
LABORATORY COMMENT REPORT: NORMAL
LAMBDA LC FREE SERPL-MCNC: 9.9 MG/L (ref 5.7–26.3)
M PROTEIN SERPL ELPH-MCNC: NORMAL G/DL
PROT SERPL-MCNC: 6.7 G/DL (ref 6–8.5)

## 2024-10-10 NOTE — PROGRESS NOTES
A My-Chart message has been sent to the patient for [place] [facility] [place]

## 2024-10-14 ENCOUNTER — HOSPITAL ENCOUNTER (OUTPATIENT)
Dept: CT IMAGING | Facility: HOSPITAL | Age: 59
Discharge: HOME | End: 2024-10-14
Admitting: INTERNAL MEDICINE
Payer: COMMERCIAL

## 2024-10-14 DIAGNOSIS — R22.1 NECK MASS: ICD-10-CM

## 2024-10-14 PROCEDURE — 70491 CT SOFT TISSUE NECK W/DYE: CPT

## 2024-10-14 PROCEDURE — 25510000001 IOPAMIDOL 61 % SOLUTION: Performed by: INTERNAL MEDICINE

## 2024-10-14 RX ORDER — IOPAMIDOL 612 MG/ML
100 INJECTION, SOLUTION INTRAVASCULAR
Status: COMPLETED | OUTPATIENT
Start: 2024-10-14 | End: 2024-10-14

## 2024-10-14 RX ADMIN — IOPAMIDOL 85 ML: 612 INJECTION, SOLUTION INTRAVENOUS at 13:37

## 2024-10-25 ENCOUNTER — PRIOR AUTHORIZATION (OUTPATIENT)
Dept: ENDOCRINOLOGY | Age: 59
End: 2024-10-25
Payer: COMMERCIAL

## 2024-10-25 NOTE — TELEPHONE ENCOUNTER
Prior Authorization submitted for      Mounjaro 2.5MG/0.5ML auto-injectors    Key: [id number]  PA Case ID #: [id number]  Rx #: [id number]

## 2024-11-04 ENCOUNTER — PRIOR AUTHORIZATION (OUTPATIENT)
Dept: ENDOCRINOLOGY | Age: 59
End: 2024-11-04
Payer: COMMERCIAL

## 2024-11-04 NOTE — TELEPHONE ENCOUNTER
[facility]  Your PA request has been approved.     Additional information will be provided in the approval communication. (Message 1147)    Authorization Expiration Date: 5/3/2025

## 2024-11-04 NOTE — TELEPHONE ENCOUNTER
A PA for Zepbound 2.5MG/0.5ML pen-injectors has been started.    (Pedroza: [id number])  PA Case ID #: [id number]

## 2024-12-13 ENCOUNTER — TELEPHONE (OUTPATIENT)
Dept: ENDOCRINOLOGY | Age: 59
End: 2024-12-13
Payer: COMMERCIAL

## 2024-12-16 ENCOUNTER — OFFICE VISIT (OUTPATIENT)
Dept: ENDOCRINOLOGY | Age: 59
End: 2024-12-16
Payer: COMMERCIAL

## 2024-12-16 VITALS
SYSTOLIC BLOOD PRESSURE: 128 MMHG | BODY MASS INDEX: 38.52 KG/M2 | OXYGEN SATURATION: 97 % | HEART RATE: 97 BPM | HEIGHT: 67 IN | WEIGHT: 245.4 LBS | DIASTOLIC BLOOD PRESSURE: 84 MMHG

## 2024-12-16 DIAGNOSIS — E66.01 CLASS 3 SEVERE OBESITY DUE TO EXCESS CALORIES WITH SERIOUS COMORBIDITY AND BODY MASS INDEX (BMI) OF 40.0 TO 44.9 IN ADULT: ICD-10-CM

## 2024-12-16 DIAGNOSIS — E66.813 CLASS 3 SEVERE OBESITY DUE TO EXCESS CALORIES WITH SERIOUS COMORBIDITY AND BODY MASS INDEX (BMI) OF 40.0 TO 44.9 IN ADULT: ICD-10-CM

## 2024-12-16 DIAGNOSIS — E03.9 HYPOTHYROIDISM, ADULT: Primary | ICD-10-CM

## 2024-12-16 PROCEDURE — 99214 OFFICE O/P EST MOD 30 MIN: CPT | Performed by: INTERNAL MEDICINE

## 2024-12-16 NOTE — PROGRESS NOTES
Chief complaint/Reason for consult: obesity and hypothyroidism    HPI:   - 59 year old female here for obesity and hypothyroidism  - She had GI side effects with Wegovy and Saxenda  - She tried phentermine but had to stop it due to elevated BP, she tried Contrave but was not able to tolerate it either  - She is currently on Zepbound 5 mg weekly  - No complaints today  - She has been on levothyroxine over 30 years  - Is currently on levothyroxine 137 mcg daily  - Denies missing any does of her levothyroxine  - Her goal weight is 180    The following portions of the patient's history were reviewed and updated as appropriate: allergies, current medications, past family history, past medical history, past social history, past surgical history, and problem list.      Objective     Vitals:    12/16/24 1506   BP: 128/84   Pulse: 97   SpO2: 97%        Physical Exam  Vitals reviewed.   Constitutional:       Appearance: Normal appearance. She is obese.   HENT:      Head: Normocephalic and atraumatic.   Eyes:      General: No scleral icterus.  Pulmonary:      Effort: Pulmonary effort is normal. No respiratory distress.   Neurological:      Mental Status: She is alert.      Gait: Gait normal.   Psychiatric:         Mood and Affect: Mood normal.         Behavior: Behavior normal.         Thought Content: Thought content normal.         Judgment: Judgment normal.         Assessment & Plan   Hypothyroidism  - Is on levothyroxine 137 mcg daily  - Will check TSH today     2.  Obesity (BMI of 38.43)  - She is on Zepbound 5 mg weekly     - Return to clinic in 6 months

## 2024-12-17 DIAGNOSIS — E03.9 HYPOTHYROIDISM, ADULT: Primary | ICD-10-CM

## 2024-12-17 LAB
ALBUMIN SERPL-MCNC: 4.2 G/DL (ref 3.5–5.2)
ALBUMIN/GLOB SERPL: 1.6 G/DL
ALP SERPL-CCNC: 87 U/L (ref 39–117)
ALT SERPL-CCNC: 17 U/L (ref 1–33)
AST SERPL-CCNC: 15 U/L (ref 1–32)
BILIRUB SERPL-MCNC: 0.3 MG/DL (ref 0–1.2)
BUN SERPL-MCNC: 13 MG/DL (ref 6–20)
BUN/CREAT SERPL: 14.8 (ref 7–25)
CALCIUM SERPL-MCNC: 10.1 MG/DL (ref 8.6–10.5)
CHLORIDE SERPL-SCNC: 103 MMOL/L (ref 98–107)
CHOLEST SERPL-MCNC: 220 MG/DL (ref 0–200)
CO2 SERPL-SCNC: 27.3 MMOL/L (ref 22–29)
CREAT SERPL-MCNC: 0.88 MG/DL (ref 0.57–1)
EGFRCR SERPLBLD CKD-EPI 2021: 76.8 ML/MIN/1.73
GLOBULIN SER CALC-MCNC: 2.7 GM/DL
GLUCOSE SERPL-MCNC: 85 MG/DL (ref 65–99)
HBA1C MFR BLD: 5.2 % (ref 4.8–5.6)
HDLC SERPL-MCNC: 55 MG/DL (ref 40–60)
IMP & REVIEW OF LAB RESULTS: NORMAL
LDLC SERPL CALC-MCNC: 142 MG/DL (ref 0–100)
POTASSIUM SERPL-SCNC: 5.1 MMOL/L (ref 3.5–5.2)
PROT SERPL-MCNC: 6.9 G/DL (ref 6–8.5)
SODIUM SERPL-SCNC: 138 MMOL/L (ref 136–145)
T4 FREE SERPL-MCNC: 2.25 NG/DL (ref 0.92–1.68)
TRIGL SERPL-MCNC: 131 MG/DL (ref 0–150)
TSH SERPL DL<=0.005 MIU/L-ACNC: 0.01 UIU/ML (ref 0.27–4.2)
VLDLC SERPL CALC-MCNC: 23 MG/DL (ref 5–40)

## 2024-12-17 RX ORDER — OLMESARTAN MEDOXOMIL AND HYDROCHLOROTHIAZIDE 20/12.5 20; 12.5 MG/1; MG/1
1 TABLET ORAL DAILY
Qty: 90 TABLET | Refills: 1 | Status: SHIPPED | OUTPATIENT
Start: 2024-12-17

## 2024-12-17 RX ORDER — LEVOTHYROXINE SODIUM 125 UG/1
125 TABLET ORAL
Qty: 90 TABLET | Refills: 1 | Status: SHIPPED | OUTPATIENT
Start: 2024-12-17

## 2024-12-18 ENCOUNTER — TELEPHONE (OUTPATIENT)
Dept: ENDOCRINOLOGY | Age: 59
End: 2024-12-18

## 2024-12-18 NOTE — TELEPHONE ENCOUNTER
----- Message from Sancho Hall sent at 12/17/2024  1:24 PM EST -----  Can she have repeat lab work in 2 months

## 2024-12-26 ENCOUNTER — OFFICE VISIT (OUTPATIENT)
Dept: OBSTETRICS AND GYNECOLOGY | Age: 59
End: 2024-12-26
Payer: COMMERCIAL

## 2024-12-26 VITALS
SYSTOLIC BLOOD PRESSURE: 120 MMHG | BODY MASS INDEX: 38.45 KG/M2 | DIASTOLIC BLOOD PRESSURE: 78 MMHG | WEIGHT: 245 LBS | HEIGHT: 67 IN

## 2024-12-26 DIAGNOSIS — L29.2 VULVAR PRURITUS: ICD-10-CM

## 2024-12-26 DIAGNOSIS — Z12.31 ENCOUNTER FOR SCREENING MAMMOGRAM FOR MALIGNANT NEOPLASM OF BREAST: ICD-10-CM

## 2024-12-26 DIAGNOSIS — N89.8 VAGINAL DRYNESS: ICD-10-CM

## 2024-12-26 DIAGNOSIS — Z01.419 ENCOUNTER FOR ANNUAL ROUTINE GYNECOLOGICAL EXAMINATION: Primary | ICD-10-CM

## 2024-12-26 RX ORDER — ESTRADIOL 4 UG/1
1 INSERT VAGINAL NIGHTLY
Qty: 8 EACH | Refills: 11 | Status: SHIPPED | OUTPATIENT
Start: 2024-12-26

## 2024-12-26 RX ORDER — TRIAMCINOLONE ACETONIDE 5 MG/G
1 CREAM TOPICAL NIGHTLY
Qty: 15 G | Refills: 3 | Status: SHIPPED | OUTPATIENT
Start: 2024-12-26

## 2024-12-26 NOTE — PROGRESS NOTES
[facility]   Obstetrics and Gynecology     2024    Patient: John Monk          MR#:[id number]    History of Present Illness    Chief Complaint   Patient presents with    Gynecologic Exam     CC: new gyn. Last pap 21 neg hpv neg. Last mg 10/24/23. Last   colonoscopy 22. Pt c/o vaginal dryness.       59 y.o. female  who presents for annual exam. She is having some vaginal dryness, mostly external.     Relevant data reviewed:    No LMP recorded. Patient is postmenopausal.  Obstetric History:  OB History          1    Para   1    Term   1            AB        Living   1         SAB        IAB        Ectopic        Molar        Multiple        Live Births   1               Menstrual History:     No LMP recorded. Patient is postmenopausal.       Social History     Substance and Sexual Activity   Sexual Activity Not Currently    Birth control/protection: None     ______________________________________  Patient Active Problem List   Diagnosis    Attention deficit hyperactivity disorder (ADHD)    Vitamin D deficiency    Vitamin B 12 deficiency    Hypothyroidism, adult    Essential hypertension    Polycystic ovarian syndrome    Class 3 severe obesity due to excess calories with serious comorbidity and body mass index (BMI) of 40.0 to 44.9 in adult    Foreign body in foot    Knee pain    Other specified postprocedural states    Generalized abdominal pain    Encounter for screening for malignant neoplasm of colon    Prediabetes    Hyperlipidemia    PCOS (polycystic ovarian syndrome)    Allergic rhinitis    Arthritis    Calculus of kidney    Osteoarthritis of left knee     Past Medical History:   Diagnosis Date    Anxiety     Essential hypertension     Hyperlipidemia     Hypothyroidism     Kidney stones     Nephrolithiasis     Osteoarthritis     Polycystic ovarian syndrome     Polycystic ovary syndrome      Past Surgical History:   Procedure Laterality Date    BREAST BIOPSY       OPEN    BREAST BIOPSY      RIGHT BREAST BIOPSY DURING BREAST SURGERY     SECTION      COLONOSCOPY N/A 2022    Procedure: COLONOSCOPY to cecum with cold bx polypectomy;  Surgeon: Dana Nathan MD;  Location: [facility] ENDOSCOPY;  Service: Gastroenterology;  Laterality: N/A;  pre: screening  post: hemorrhoids, polyps    GALLBLADDER SURGERY      KNEE ARTHROSCOPY Bilateral      Social History     Tobacco Use   Smoking Status Never   Smokeless Tobacco Never     Family History   Problem Relation Age of Onset    Diabetes Mother     Heart disease Mother     Hypertension Mother     Thyroid disease Mother     Anemia Mother     Deep vein thrombosis Mother     Coronary artery disease Mother     Osteoporosis Mother     Diabetes Father     Hypertension Father     Heart disease Father     Nephrolithiasis Father     COPD Father     Thyroid disease Sister     Thyroid disease Brother     Thyroid disease Daughter     Malig Hyperthermia Neg Hx      Prior to Admission medications    Medication Sig Start Date End Date Taking? Authorizing Provider   Cyanocobalamin 1000 MCG/ML kit Inject 1 mL as directed Every 30 (Thirty) Days. Takes once monthly 10/1/24   Mariella Gonsalez APRN   FLUoxetine (PROzac) 20 MG capsule Take 1 capsule by mouth Daily. 24   Mariella Gonsalez APRN   Gel-One 30 MG/3ML prefilled syringe injection Inject 3 mL into the appropriate joint as directed by provider 1 (One) Time. yearly 23   Provider, MD Mamadou   levothyroxine (SYNTHROID, LEVOTHROID) 125 MCG tablet Take 1 tablet by mouth Every Morning. 24   Sancho Hall,    olmesartan-hydrochlorothiazide (BENICAR HCT) 20-12.5 MG per tablet TAKE 1 TABLET BY MOUTH DAILY 24   Mariella Gonsalez APRN   omeprazole (priLOSEC) 40 MG capsule TAKE ONE CAPSULE BY MOUTH EVERY DAY AS NEEDED FOR ACID REFLUX 8/15/24   Mariella Gonsalez APRN   Tirzepatide-Weight Management (ZEPBOUND) 5 MG/0.5ML solution auto-injector Inject 0.5 mL under  "the skin into the appropriate area as directed 1 (One) Time Per Week. 12/3/24   Sancho Hall, DO   vitamin D (ERGOCALCIFEROL) 1.25 MG (33298 UT) capsule capsule Take 1 capsule by mouth 1 (One) Time Per Week. 10/1/24   Merajluistian Mariella APRHOLLI     _______________________________________    Current contraception: post menopausal status  History of abnormal Pap smear: no  Family history of uterine or ovarian cancer: no  Family History of colon cancer/colon polyps: no  History of abnormal mammogram: yes - had breast biopsy and it was benign   History of abnormal lipids: yes - managed by PCP     The following portions of the patient's history were reviewed and updated as appropriate: allergies, current medications, past family history, past medical history, past social history, past surgical history, and problem list.        Pertinent items are noted in HPI.       Objective   Physical Exam  Vitals and nursing note reviewed. Exam conducted with a chaperone present.   Constitutional:       Appearance: Normal appearance.   HENT:      Head: Normocephalic and atraumatic.   Pulmonary:      Effort: Pulmonary effort is normal.   Abdominal:      General: Abdomen is flat.      Palpations: Abdomen is soft.   Genitourinary:     Exam position: Lithotomy position.      Labia:         Right: No rash or lesion.         Left: No rash or lesion.       Vagina: Normal. No vaginal discharge or lesions.      Cervix: Normal. No lesion.      Uterus: Normal. Not tender.       Adnexa: Right adnexa normal and left adnexa normal.        Right: No mass or tenderness.          Left: No mass or tenderness.            Comments: Some dry patches appear excematous    Skin:     General: Skin is warm and dry.   Neurological:      Mental Status: She is alert and oriented to person, place, and time.   Psychiatric:         Mood and Affect: Mood normal.         /78   Ht 170 cm (66.93\")   Wt 111 kg (245 lb)   BMI 38.45 kg/m²    BP Readings from Last 3 " "Encounters:   24 120/78   24 128/84   10/09/24 143/82      Wt Readings from Last 3 Encounters:   24 111 kg (245 lb)   24 111 kg (245 lb 6.4 oz)   10/09/24 114 kg (251 lb 14.4 oz)        BMI: Estimated body mass index is 38.45 kg/m² as calculated from the following:    Height as of this encounter: 170 cm (66.93\").    Weight as of this encounter: 111 kg (245 lb).    As part of wellness and prevention, the following topics were discussed with the patient:  Encouraged self breast exam  Sexual transmitted disease prevention   Dental health discussed  Vaccinations/immunizations addressed.           Problem List   Meds  History  Prep for Surg   Imagin}    Assessment:  59 y.o. female  who presents for annual exam.  Diagnoses and all orders for this visit:    1. Encounter for annual routine gynecological examination (Primary)    2. Encounter for screening mammogram for malignant neoplasm of breast  -     Mammo screening digital tomosynthesis bilateral w CAD; Future    3. Vaginal dryness  Comments:  - Dry excematous appearing skin on labia majora > will treat with steroid cream  - following steroid tx will try vaginal estrogen for internal atrophy  Orders:  -     Estradiol Starter Pack (Imvexxy Starter Pack) 4 MCG insert; Insert 1 Insert into the vagina Every Night. Nightly for two weeks followed by twice weekly  Dispense: 8 each; Refill: 11    4. Vulvar pruritus  -     triamcinolone (KENALOG) 0.5 % cream; Apply 1 Application topically to the appropriate area as directed Every Night.  Dispense: 15 g; Refill: 3    - Pap up to date  - Mammo/Colonoscopy colonoscopy up to date, mammo ordered  - Vaccine recs reviewed  - STI screening declined       Plan:  Return in about 6 weeks (around 2025).    Tiffany Donahue MD  2024 14:51 EST  "

## 2025-01-07 RX ORDER — TIRZEPATIDE 7.5 MG/.5ML
7.5 INJECTION, SOLUTION SUBCUTANEOUS WEEKLY
Qty: 2 ML | Refills: 5 | Status: SHIPPED | OUTPATIENT
Start: 2025-01-07

## 2025-02-17 RX ORDER — ERGOCALCIFEROL 1.25 MG/1
50000 CAPSULE, LIQUID FILLED ORAL WEEKLY
Qty: 13 CAPSULE | Refills: 0 | Status: SHIPPED | OUTPATIENT
Start: 2025-02-17

## 2025-02-21 ENCOUNTER — HOSPITAL ENCOUNTER (OUTPATIENT)
Facility: HOSPITAL | Age: 60
Discharge: HOME | End: 2025-02-21
Payer: COMMERCIAL

## 2025-02-21 ENCOUNTER — LAB (OUTPATIENT)
Facility: HOSPITAL | Age: 60
End: 2025-02-21
Payer: COMMERCIAL

## 2025-02-21 DIAGNOSIS — E55.9 VITAMIN D DEFICIENCY: Primary | ICD-10-CM

## 2025-02-21 DIAGNOSIS — Z12.31 ENCOUNTER FOR SCREENING MAMMOGRAM FOR MALIGNANT NEOPLASM OF BREAST: ICD-10-CM

## 2025-02-21 DIAGNOSIS — E03.9 HYPOTHYROIDISM, ADULT: ICD-10-CM

## 2025-02-21 DIAGNOSIS — E53.8 VITAMIN B 12 DEFICIENCY: ICD-10-CM

## 2025-02-21 LAB
25(OH)D3 SERPL-MCNC: 22.9 NG/ML (ref 30–100)
FOLATE SERPL-MCNC: 7.52 NG/ML (ref 4.78–24.2)
TSH SERPL DL<=0.05 MIU/L-ACNC: 1.03 UIU/ML (ref 0.27–4.2)
VIT B12 BLD-MCNC: 487 PG/ML (ref 211–946)

## 2025-02-21 PROCEDURE — 82607 VITAMIN B-12: CPT | Performed by: INTERNAL MEDICINE

## 2025-02-21 PROCEDURE — 82746 ASSAY OF FOLIC ACID SERUM: CPT | Performed by: INTERNAL MEDICINE

## 2025-02-21 PROCEDURE — 82306 VITAMIN D 25 HYDROXY: CPT | Performed by: INTERNAL MEDICINE

## 2025-02-21 PROCEDURE — 84443 ASSAY THYROID STIM HORMONE: CPT | Performed by: INTERNAL MEDICINE

## 2025-02-21 PROCEDURE — 77067 SCR MAMMO BI INCL CAD: CPT

## 2025-02-21 PROCEDURE — 84439 ASSAY OF FREE THYROXINE: CPT | Performed by: INTERNAL MEDICINE

## 2025-02-21 PROCEDURE — 77063 BREAST TOMOSYNTHESIS BI: CPT

## 2025-03-10 ENCOUNTER — PATIENT MESSAGE (OUTPATIENT)
Dept: ENDOCRINOLOGY | Age: 60
End: 2025-03-10
Payer: COMMERCIAL

## 2025-04-22 ENCOUNTER — PRIOR AUTHORIZATION (OUTPATIENT)
Dept: ENDOCRINOLOGY | Age: 60
End: 2025-04-22
Payer: COMMERCIAL

## 2025-04-23 NOTE — TELEPHONE ENCOUNTER
Your PA request has been approved.     Additional information will be provided in the approval communication. (Message 7652).     Authorization Expiration Date: April 22, 2026.

## 2025-05-05 ENCOUNTER — PATIENT MESSAGE (OUTPATIENT)
Dept: ENDOCRINOLOGY | Age: 60
End: 2025-05-05
Payer: COMMERCIAL

## 2025-05-06 RX ORDER — SEMAGLUTIDE 1.7 MG/.75ML
1.7 INJECTION, SOLUTION SUBCUTANEOUS WEEKLY
Qty: 9 ML | Refills: 1 | Status: SHIPPED | OUTPATIENT
Start: 2025-05-06

## 2025-05-20 NOTE — TELEPHONE ENCOUNTER
Rx Refill Note  Requested Prescriptions     Pending Prescriptions Disp Refills    FLUoxetine (PROzac) 20 MG capsule 90 capsule 0     Sig: Take 1 capsule by mouth Daily.      Last office visit with prescribing clinician: 9/27/2024   Last telemedicine visit with prescribing clinician: Visit date not found   Next office visit with prescribing clinician:    No appt, pt ask to RTC 3/2025

## 2025-06-16 ENCOUNTER — OFFICE VISIT (OUTPATIENT)
Dept: ENDOCRINOLOGY | Age: 60
End: 2025-06-16
Payer: COMMERCIAL

## 2025-06-16 VITALS
HEIGHT: 67 IN | BODY MASS INDEX: 36.51 KG/M2 | OXYGEN SATURATION: 99 % | HEART RATE: 75 BPM | SYSTOLIC BLOOD PRESSURE: 126 MMHG | DIASTOLIC BLOOD PRESSURE: 84 MMHG | WEIGHT: 232.6 LBS

## 2025-06-16 DIAGNOSIS — E03.9 HYPOTHYROIDISM, ADULT: ICD-10-CM

## 2025-06-16 PROCEDURE — 99214 OFFICE O/P EST MOD 30 MIN: CPT | Performed by: INTERNAL MEDICINE

## 2025-06-16 RX ORDER — ERGOCALCIFEROL 1.25 MG/1
50000 CAPSULE, LIQUID FILLED ORAL WEEKLY
Qty: 12 CAPSULE | Refills: 1 | Status: SHIPPED | OUTPATIENT
Start: 2025-06-16

## 2025-06-16 RX ORDER — LEVOTHYROXINE SODIUM 125 UG/1
125 TABLET ORAL
Qty: 90 TABLET | Refills: 1 | Status: SHIPPED | OUTPATIENT
Start: 2025-06-16

## 2025-06-17 NOTE — PROGRESS NOTES
Chief complaint/Reason for consult: hypothyroidism    HPI:   - 59 year old female here for obesity and hypothyroidism  - Last seen in 12/2024  - She had GI side effects with Wegovy and Saxenda  - She tried phentermine but had to stop it due to elevated BP, she tried Contrave but was not able to tolerate it either  - She is currently on Wegovy 1.7 mcg daily  - No complaints today  - She has been on levothyroxine over 30 years  - Is currently on levothyroxine 125 mcg daily  - Denies missing any does of her levothyroxine  - Her goal weight is 180    The following portions of the patient's history were reviewed and updated as appropriate: allergies, current medications, past family history, past medical history, past social history, past surgical history, and problem list.      Objective     Vitals:    06/16/25 1552   BP: 126/84   Pulse: 75   SpO2: 99%        Physical Exam  Vitals reviewed.   Constitutional:       Appearance: Normal appearance.   HENT:      Head: Normocephalic and atraumatic.   Eyes:      General: No scleral icterus.  Pulmonary:      Effort: Pulmonary effort is normal. No respiratory distress.   Neurological:      Mental Status: She is alert.      Gait: Gait normal.   Psychiatric:         Mood and Affect: Mood normal.         Behavior: Behavior normal.         Thought Content: Thought content normal.         Judgment: Judgment normal.         Assessment & Plan   Hypothyroidism  - Is on levothyroxine 125 mcg daily  - Last TSH was normal in 2/2024     2.  Obesity (BMI of 36.51)  - Sent in compounded Zepbound 15 mg weekly     - Return to clinic in 6 months

## 2025-06-20 RX ORDER — OLMESARTAN MEDOXOMIL AND HYDROCHLOROTHIAZIDE 20/12.5 20; 12.5 MG/1; MG/1
1 TABLET ORAL DAILY
Qty: 30 TABLET | Refills: 0 | Status: SHIPPED | OUTPATIENT
Start: 2025-06-20

## 2025-07-07 ENCOUNTER — OFFICE VISIT (OUTPATIENT)
Dept: INTERNAL MEDICINE | Facility: CLINIC | Age: 60
End: 2025-07-07
Payer: COMMERCIAL

## 2025-07-07 VITALS
HEIGHT: 67 IN | DIASTOLIC BLOOD PRESSURE: 70 MMHG | SYSTOLIC BLOOD PRESSURE: 140 MMHG | HEART RATE: 62 BPM | WEIGHT: 224 LBS | OXYGEN SATURATION: 98 % | BODY MASS INDEX: 35.16 KG/M2

## 2025-07-07 DIAGNOSIS — E03.9 HYPOTHYROIDISM, ADULT: Chronic | ICD-10-CM

## 2025-07-07 DIAGNOSIS — I10 ESSENTIAL HYPERTENSION: Chronic | ICD-10-CM

## 2025-07-07 DIAGNOSIS — Z00.00 ROUTINE HEALTH MAINTENANCE: Primary | ICD-10-CM

## 2025-07-07 DIAGNOSIS — E55.9 VITAMIN D DEFICIENCY: Chronic | ICD-10-CM

## 2025-07-07 DIAGNOSIS — R22.1 MASS IN NECK: ICD-10-CM

## 2025-07-07 DIAGNOSIS — E53.8 VITAMIN B 12 DEFICIENCY: Chronic | ICD-10-CM

## 2025-07-07 PROCEDURE — 99214 OFFICE O/P EST MOD 30 MIN: CPT

## 2025-07-07 RX ORDER — OLMESARTAN MEDOXOMIL AND HYDROCHLOROTHIAZIDE 20/12.5 20; 12.5 MG/1; MG/1
1 TABLET ORAL DAILY
Qty: 90 TABLET | Refills: 0 | Status: SHIPPED | OUTPATIENT
Start: 2025-07-07

## 2025-07-07 RX ORDER — ERGOCALCIFEROL 1.25 MG/1
50000 CAPSULE, LIQUID FILLED ORAL WEEKLY
Qty: 12 CAPSULE | Refills: 1 | Status: SHIPPED | OUTPATIENT
Start: 2025-07-07

## 2025-07-07 NOTE — PROGRESS NOTES
Chief Complaint  Hypertension     Subjective:      History of Present Illness {CC  Problem List  Visit  Diagnosis   Encounters  Notes  Medications  Labs  Result Review Imaging  Media :23}     John Monk presents to [facility] for:    Patient or patient representative verbalized consent for the use of Ambient Listening during the visit with  LUISITO Trinidad for chart documentation. 7/7/2025  15:46 EDT     History of Present Illness      The patient presents for a routine checkup.    She reports no new health issues. She has been monitoring her blood pressure at home, which was recorded as 124 yesterday. She is seeking refills for her olmesartan medication. She forgot to take her blood pressure pill today.    She is also requesting refills for her vitamin D and B12 supplements. She does not require a refill of Prilosec as she has an adequate supply. She is under the care of Dr. Hall for her thyroid condition. She is not experiencing any constipation or respiratory symptoms such as shortness of breath or wheezing.    She has lost weight, dropping from 245 pounds to 224 pounds. She is currently on Zepbound, prescribed by Dr. Hall. However, due to a significant increase in the cost of this medication, she attempted to switch back to Zipboard, which did not yield satisfactory results. During her last visit with Dr. Hall, he suggested a compounded version of the medication, but this too proved costly.    She has a bulge in her throat, which has decreased in size with weight loss, but remains visible. She had a consultation with an oncologist who conducted some tumor marker tests and advised her to follow up with her primary care physician. The oncologist reassured her that the results were normal.       I have reviewed patient's medical history, any new submitted information provided by patient or medical assistant and updated medical record.      Objective:       Physical Exam  Vitals reviewed.   Constitutional:       General: She is awake. She is not in acute distress.     Appearance: Normal appearance. She is well-groomed. She is not ill-appearing, toxic-appearing or diaphoretic.   HENT:      Head: Normocephalic.      Right Ear: Hearing normal.      Left Ear: Hearing normal.      Nose: Nose normal.      Mouth/Throat:      Lips: Pink.      Mouth: Mucous membranes are moist.   Eyes:      General: Lids are normal. Vision grossly intact.      Conjunctiva/sclera: Conjunctivae normal.   Neck:      Thyroid: No thyroid mass, thyromegaly or thyroid tenderness.      Vascular: Normal carotid pulses. No carotid bruit, hepatojugular reflux or JVD.      Trachea: Trachea and phonation normal.        Comments: Lymph swelling, CT scan and tumor markers - per Hem/Onc  Cardiovascular:      Rate and Rhythm: Normal rate and regular rhythm.      Heart sounds: Normal heart sounds.   Pulmonary:      Effort: Pulmonary effort is normal.      Breath sounds: Normal breath sounds.   Abdominal:      General: Bowel sounds are normal.   Musculoskeletal:      Cervical back: Full passive range of motion without pain and neck supple.   Lymphadenopathy:      Cervical: Cervical adenopathy present.      Right cervical: Posterior cervical adenopathy present. No superficial or deep cervical adenopathy.     Left cervical: Posterior cervical adenopathy present. No superficial or deep cervical adenopathy.   Skin:     General: Skin is warm and dry.      Capillary Refill: Capillary refill takes less than 2 seconds.   Neurological:      General: No focal deficit present.      Mental Status: She is alert and oriented to person, place, and time. Mental status is at baseline.   Psychiatric:         Attention and Perception: Attention and perception normal.         Mood and Affect: Mood and affect normal.         Speech: Speech normal.         Behavior: Behavior normal. Behavior is cooperative.         Cognition and  Memory: Cognition and memory normal.         Judgment: Judgment normal.        Result Review  Data Reviewed:{ Labs  Result Review  Imaging  Med Tab  Media :23}     Results         The following data was reviewed by: LUISITO Trinidad on 07/07/2025  CMP          9/27/2024    16:06 10/9/2024    10:21 12/16/2024    15:25   CMP   Glucose 82   85    BUN 15   13    Creatinine 0.94   0.88    EGFR 70.9   76.8    Sodium 137   138    Potassium 4.5   5.1    Chloride 100   103    Calcium 10.1   10.1    Total Protein 6.8  6.7  6.9    Albumin 4.6  3.7  4.2    Globulin 2.2  3.0  2.7    Total Bilirubin 0.6   0.3    Alkaline Phosphatase 93   87    AST (SGOT) 14   15    ALT (SGPT) 19   17    Albumin/Globulin Ratio 2.1  1.3  1.6    BUN/Creatinine Ratio 16.0   14.8      CBC          9/27/2024    16:06 10/9/2024    10:21   CBC   WBC 9.51  8.90    RBC 4.56  4.56    Hemoglobin 12.8  12.4    Hematocrit 37.9  39.3    MCV 83.1  86.2    MCH 28.1  27.2    MCHC 33.8  31.6    RDW 13.7  13.4    Platelets 373  371      CBC w/diff          9/27/2024    16:06 10/9/2024    10:21   CBC w/Diff   WBC 9.51  8.90    RBC 4.56  4.56    Hemoglobin 12.8  12.4    Hematocrit 37.9  39.3    MCV 83.1  86.2    MCH 28.1  27.2    MCHC 33.8  31.6    RDW 13.7  13.4    Platelets 373  371    Neutrophil Rel % 59.7  61.5    Immature Granulocyte Rel %  0.4    Lymphocyte Rel % 32.6  30.8    Monocyte Rel % 6.1  5.8    Eosinophil Rel % 0.8  1.1    Basophil Rel % 0.4  0.4      Lipid Panel          9/27/2024    16:06 12/16/2024    15:25   Lipid Panel   Total Cholesterol 250  220    Triglycerides 140  131    HDL Cholesterol 64  55    VLDL Cholesterol 25  23    LDL Cholesterol  161  142      TSH          9/27/2024    16:06 12/16/2024    15:25 2/21/2025    14:47   TSH   TSH 6.360  0.013  1.030      Electrolytes          9/27/2024    16:06 12/16/2024    15:25   Electrolytes   Sodium 137  138    Potassium 4.5  5.1    Chloride 100  103    Calcium 10.1  10.1      A1C Last 3  "Results          9/27/2024    16:06 12/16/2024    15:25   HGBA1C Last 3 Results   Hemoglobin A1C 5.60  5.20      Microalbumin          9/27/2024    16:06   Microalbumin   Microalbumin, Urine 13.9        Consult with Laine Winn MD (10/09/2024)            Vital Signs:   /70 (BP Location: Left arm, Patient Position: Sitting, Cuff Size: Adult)   Pulse 62   Ht 170 cm (66.93\")   Wt 102 kg (224 lb)   SpO2 98%   BMI 35.16 kg/m²                   Requested Prescriptions     Signed Prescriptions Disp Refills    olmesartan-hydrochlorothiazide (BENICAR HCT) 20-12.5 MG per tablet 90 tablet 0     Sig: Take 1 tablet by mouth Daily.    vitamin D (ERGOCALCIFEROL) 1.25 MG (74164 UT) capsule capsule 12 capsule 1     Sig: Take 1 capsule by mouth 1 (One) Time Per Week.    Cyanocobalamin 1000 MCG/ML kit 1 kit 6     Sig: Inject 1 mL as directed Every 30 (Thirty) Days. Takes once monthly       Routine medications provided by this office will also be refilled via pharmacy request.       Current Outpatient Medications:     Cyanocobalamin 1000 MCG/ML kit, Inject 1 mL as directed Every 30 (Thirty) Days. Takes once monthly, Disp: 1 kit, Rfl: 6    FLUoxetine (PROzac) 20 MG capsule, Take 1 capsule by mouth Daily., Disp: 90 capsule, Rfl: 1    Gel-One 30 MG/3ML prefilled syringe injection, Inject 3 mL into the appropriate joint as directed by provider 1 (One) Time. yearly, Disp: , Rfl:     levothyroxine (SYNTHROID, LEVOTHROID) 125 MCG tablet, Take 1 tablet by mouth Every Morning., Disp: 90 tablet, Rfl: 1    olmesartan-hydrochlorothiazide (BENICAR HCT) 20-12.5 MG per tablet, Take 1 tablet by mouth Daily., Disp: 90 tablet, Rfl: 0    omeprazole (priLOSEC) 40 MG capsule, TAKE ONE CAPSULE BY MOUTH EVERY DAY AS NEEDED FOR ACID REFLUX, Disp: 30 capsule, Rfl: 0    Tirzepatide-Weight Management (ZEPBOUND) 15 MG/0.5ML solution auto-injector, Inject 0.5 mL under the skin into the appropriate area as directed 1 (One) Time Per Week. Ok to " compound with B12, R53, Disp: 2 mL, Rfl: 5    vitamin D (ERGOCALCIFEROL) 1.25 MG (31221 UT) capsule capsule, Take 1 capsule by mouth 1 (One) Time Per Week., Disp: 12 capsule, Rfl: 1     Assessment and Plan:      Assessment and Plan {CC Problem List  Visit Diagnosis  ROS  Review (Popup)  Health Maintenance  Quality  BestPractice  Medications  SmartSets  SnapShot Encounters  Media :23}     Problem List Items Addressed This Visit       Vitamin D deficiency (Chronic)    Relevant Medications    vitamin D (ERGOCALCIFEROL) 1.25 MG (86872 UT) capsule capsule    Other Relevant Orders    Vitamin D 25 hydroxy    Vitamin B 12 deficiency (Chronic)    Relevant Medications    Cyanocobalamin 1000 MCG/ML kit    Other Relevant Orders    Lipid panel    Vitamin B12    Hypothyroidism, adult (Chronic)    Relevant Medications    vitamin D (ERGOCALCIFEROL) 1.25 MG (05830 UT) capsule capsule    Other Relevant Orders    TSH    T3, free    T4, free    Essential hypertension (Chronic)    Relevant Medications    olmesartan-hydrochlorothiazide (BENICAR HCT) 20-12.5 MG per tablet    Other Relevant Orders    CBC w AUTO Differential    Comprehensive metabolic panel    Mass in neck    Overview   Bilateral neck mass s/p Covid infection  CT completed  Consulted Hem/Onc - tumor markers negative  Continue to monitor for changes          Other Visit Diagnoses         Routine health maintenance    -  Primary                 1. Hypertension.  - Blood pressure typically measures in the 120s.  - Forgot to take blood pressure medication today.  - Prescription for olmesartan sent to pharmacy.  - Advised to monitor blood pressure regularly and report any persistent elevation.    2. Weight management.  - Weight decreased from 245 pounds to 224 pounds.  - Currently on Zepbound, obtained from Dr. Hall.  - Medication cost has increased significantly, but will continue despite higher cost.  - No constipation issues reported.    3. Thyroid management.  -  Thyroid levels last checked in 02/2025.  - Dr. Hall managing thyroid condition.  - Thyroid panel included in today's lab work.  - Throat bulge has decreased in size with weight loss but remains visible.    4. Health maintenance.  - Advised to continue vitamin D and B12 supplements.  - Routine lab work including CBC, CMP, cholesterol, B12, vitamin D, and thyroid panel ordered today.  - Results will be communicated upon receipt.  - No shortness of breath or wheezing reported.    Follow-up  The patient will follow up on 09/27/2025 for her annual physical examination.     Please complete your lab work today. Following review of results our office will be in contact with you for follow up care, medication changes or further instructions if needed. At that time if we need to schedule a follow up appointment one will be made at the time of the call.    Thank you for allowing us to care for you,  LUISITO Trinidad    Follow Up {Instructions Charge Capture  Follow-up Communications :23}     Return in about 12 weeks (around 9/29/2025) for Annual physical.      Patient was given instructions and counseling regarding her condition or for health maintenance advice. Please see specific information pulled into the AVS if appropriate.        Dragon disclaimer:   Much of this encounter note is an electronic transcription/translation of spoken language to printed text. The electronic translation of spoken language may permit erroneous, or at times, nonsensical words or phrases to be inadvertently transcribed; Although I have reviewed the note for such errors, some may still exist.     Additional Patient Counseling:       There are no Patient Instructions on file for this visit.

## 2025-07-08 LAB
25(OH)D3+25(OH)D2 SERPL-MCNC: 27.6 NG/ML (ref 30–100)
ALBUMIN SERPL-MCNC: 4.5 G/DL (ref 3.5–5.2)
ALBUMIN/GLOB SERPL: 1.7 G/DL
ALP SERPL-CCNC: 87 U/L (ref 39–117)
ALT SERPL-CCNC: 17 U/L (ref 1–33)
AST SERPL-CCNC: 14 U/L (ref 1–32)
BASOPHILS # BLD AUTO: 0.03 10*3/MM3 (ref 0–0.2)
BASOPHILS NFR BLD AUTO: 0.3 % (ref 0–1.5)
BILIRUB SERPL-MCNC: 0.5 MG/DL (ref 0–1.2)
BUN SERPL-MCNC: 19 MG/DL (ref 6–20)
BUN/CREAT SERPL: 17.8 (ref 7–25)
CALCIUM SERPL-MCNC: 9.9 MG/DL (ref 8.6–10.5)
CHLORIDE SERPL-SCNC: 102 MMOL/L (ref 98–107)
CHOLEST SERPL-MCNC: 245 MG/DL (ref 0–200)
CO2 SERPL-SCNC: 23.6 MMOL/L (ref 22–29)
CREAT SERPL-MCNC: 1.07 MG/DL (ref 0.57–1)
EGFRCR SERPLBLD CKD-EPI 2021: 60.7 ML/MIN/1.73
EOSINOPHIL # BLD AUTO: 0.09 10*3/MM3 (ref 0–0.4)
EOSINOPHIL NFR BLD AUTO: 0.8 % (ref 0.3–6.2)
ERYTHROCYTE [DISTWIDTH] IN BLOOD BY AUTOMATED COUNT: 12.9 % (ref 12.3–15.4)
GLOBULIN SER CALC-MCNC: 2.6 GM/DL
GLUCOSE SERPL-MCNC: 82 MG/DL (ref 65–99)
HCT VFR BLD AUTO: 40.4 % (ref 34–46.6)
HDLC SERPL-MCNC: 82 MG/DL (ref 40–60)
HGB BLD-MCNC: 13.6 G/DL (ref 12–15.9)
IMM GRANULOCYTES # BLD AUTO: 0.04 10*3/MM3 (ref 0–0.05)
IMM GRANULOCYTES NFR BLD AUTO: 0.3 % (ref 0–0.5)
LDLC SERPL CALC-MCNC: 141 MG/DL (ref 0–100)
LYMPHOCYTES # BLD AUTO: 3.21 10*3/MM3 (ref 0.7–3.1)
LYMPHOCYTES NFR BLD AUTO: 28 % (ref 19.6–45.3)
MCH RBC QN AUTO: 28.5 PG (ref 26.6–33)
MCHC RBC AUTO-ENTMCNC: 33.7 G/DL (ref 31.5–35.7)
MCV RBC AUTO: 84.5 FL (ref 79–97)
MONOCYTES # BLD AUTO: 0.64 10*3/MM3 (ref 0.1–0.9)
MONOCYTES NFR BLD AUTO: 5.6 % (ref 5–12)
NEUTROPHILS # BLD AUTO: 7.46 10*3/MM3 (ref 1.7–7)
NEUTROPHILS NFR BLD AUTO: 65 % (ref 42.7–76)
NRBC BLD AUTO-RTO: 0 /100 WBC (ref 0–0.2)
PLATELET # BLD AUTO: 362 10*3/MM3 (ref 140–450)
POTASSIUM SERPL-SCNC: 5 MMOL/L (ref 3.5–5.2)
PROT SERPL-MCNC: 7.1 G/DL (ref 6–8.5)
RBC # BLD AUTO: 4.78 10*6/MM3 (ref 3.77–5.28)
SODIUM SERPL-SCNC: 137 MMOL/L (ref 136–145)
T3FREE SERPL-MCNC: 1.7 PG/ML (ref 2–4.4)
T4 FREE SERPL-MCNC: 1 NG/DL (ref 0.92–1.68)
TRIGL SERPL-MCNC: 125 MG/DL (ref 0–150)
TSH SERPL DL<=0.005 MIU/L-ACNC: 18.8 UIU/ML (ref 0.27–4.2)
VIT B12 SERPL-MCNC: 270 PG/ML (ref 211–946)
VLDLC SERPL CALC-MCNC: 22 MG/DL (ref 5–40)
WBC # BLD AUTO: 11.47 10*3/MM3 (ref 3.4–10.8)

## 2025-07-09 ENCOUNTER — RESULTS FOLLOW-UP (OUTPATIENT)
Dept: INTERNAL MEDICINE | Facility: CLINIC | Age: 60
End: 2025-07-09
Payer: COMMERCIAL

## 2025-07-09 DIAGNOSIS — E03.9 HYPOTHYROIDISM, ADULT: Primary | Chronic | ICD-10-CM

## 2025-07-09 DIAGNOSIS — D72.829 LEUKOCYTOSIS, UNSPECIFIED TYPE: ICD-10-CM

## 2025-07-09 DIAGNOSIS — R79.89 ELEVATED SERUM CREATININE: ICD-10-CM

## 2025-07-09 NOTE — PROGRESS NOTES
MS Monk,   I have reviewed your labs. Your CBC shows an elevation in your white count. If you are experiencing any signs of infections, fever, cough or difficulty urinating please contact our office for further evaluation. Your CMP shows in elevation in your creatinine and decrease in your GFR (overall kidney function) I would like you to make sure you are having adequate daily water intake. Your cholesterol has increased. Please focus on dietary changes such as reducing added sugars, decreasing saturated fats, and increase foods with omega-3 fatty acids. Also, engaging in 30 minutes of exercise 5 times per week can improve your cholesterol.     Your thyroid levels are extremely high. Please make sure you are taking your medication as prescribed. Take first thing in the morning on an empty stomach with a large glass of water ONLY. No milk or any juice product. Do not take any other medications or eat anything for 45-60 minutes after taking the medication.   You can eat, drink and take other medications after 45-60 minutes.  Do not take any supplements for at least 3 hours after taking the medication.    Calcium and iron should be taken later in day if possible.  Also, medications to treat excess gastric acid (for reflux) should be taken later in the day.    Your B12 is within normal limits and your Vitamin D is low please make sure you are taking your vitamin D as well.     Please schedule a lab appointment with our office in two weeks to repeat your thyroid levels, cmp and cbc.     Please let us know if you have any further questions or concerns.  Thank you for allowing us to care for you,  LUISITO Trinidad

## 2025-07-10 NOTE — TELEPHONE ENCOUNTER
Called pt and sched a 2 week lab repeat pt needed a 3 week lab appt so appt sched for 7/30 at 345p. Pt has no questions or concerns

## 2025-07-28 NOTE — TELEPHONE ENCOUNTER
I spoke with patient, advised I will place the order for her to come into office for lab evaluation. Can you please give her a call with a certain time if necessary.   Patient requesting controlled substance refill of:    Per PDMP:  Methylphenidate 36 mg  Quantity: 30  Days: 30  Refills: 0  Prescribed: 6-3-2025  Dispensed: 6-  Sold: 6-    PDMP reviewed: Yes    Requesting early refill?: No    Last Drug Screen: 5-    Last controlled substance contract: 5-    Other controlled substances?: Methylphenidate 10 mg    No protocol for requested medication     Medication: see above  Last office visit date: 5-  Pharmacy: [id number], [address]    Order pended, routed to clinician for review.    Next office visit: no data

## 2025-07-31 ENCOUNTER — RESULTS FOLLOW-UP (OUTPATIENT)
Dept: INTERNAL MEDICINE | Facility: CLINIC | Age: 60
End: 2025-07-31
Payer: COMMERCIAL

## 2025-07-31 NOTE — PROGRESS NOTES
Ms Monk,   Happy belated birthday!  I have reviewed your labs. Your thyroid function is within normal range. Your CBC is stable; no evidence of anemia or infection. Your CMP shows stable liver and yrou kidney function has drastically improved.     Keep up the good work.  Please let us know if you have any further questions or concerns.  Thank you for allowing us to care for you,  LUISITO Trinidad

## (undated) DEVICE — LN SMPL CO2 SHTRM SD STREAM W/M LUER

## (undated) DEVICE — SENSR O2 OXIMAX FNGR A/ 18IN NONSTR

## (undated) DEVICE — TUBING, SUCTION, 1/4" X 10', STRAIGHT: Brand: MEDLINE

## (undated) DEVICE — ADAPT CLN BIOGUARD AIR/H2O DISP

## (undated) DEVICE — KT ORCA ORCAPOD DISP STRL

## (undated) DEVICE — CANN O2 ETCO2 FITS ALL CONN CO2 SMPL A/ 7IN DISP LF